# Patient Record
Sex: FEMALE | Race: BLACK OR AFRICAN AMERICAN | NOT HISPANIC OR LATINO
[De-identification: names, ages, dates, MRNs, and addresses within clinical notes are randomized per-mention and may not be internally consistent; named-entity substitution may affect disease eponyms.]

---

## 2017-03-04 PROBLEM — Z00.00 ENCOUNTER FOR PREVENTIVE HEALTH EXAMINATION: Status: ACTIVE | Noted: 2017-03-04

## 2017-03-08 ENCOUNTER — APPOINTMENT (OUTPATIENT)
Dept: ULTRASOUND IMAGING | Facility: HOSPITAL | Age: 82
End: 2017-03-08

## 2017-03-08 ENCOUNTER — OUTPATIENT (OUTPATIENT)
Dept: OUTPATIENT SERVICES | Facility: HOSPITAL | Age: 82
LOS: 1 days | Discharge: HOME | End: 2017-03-08
Payer: COMMERCIAL

## 2017-03-08 DIAGNOSIS — R60.9 EDEMA, UNSPECIFIED: ICD-10-CM

## 2017-03-08 PROCEDURE — 93923 UPR/LXTR ART STDY 3+ LVLS: CPT | Mod: 26

## 2018-07-30 ENCOUNTER — INPATIENT (INPATIENT)
Facility: HOSPITAL | Age: 83
LOS: 9 days | Discharge: NURSING, REHAB OR LONG-TERM CARE | DRG: 378 | End: 2018-08-09
Attending: INTERNAL MEDICINE | Admitting: INTERNAL MEDICINE
Payer: COMMERCIAL

## 2018-07-30 VITALS
HEIGHT: 63 IN | TEMPERATURE: 99 F | HEART RATE: 85 BPM | WEIGHT: 169.98 LBS | SYSTOLIC BLOOD PRESSURE: 109 MMHG | DIASTOLIC BLOOD PRESSURE: 65 MMHG | RESPIRATION RATE: 16 BRPM | OXYGEN SATURATION: 99 %

## 2018-07-30 DIAGNOSIS — D64.9 ANEMIA, UNSPECIFIED: ICD-10-CM

## 2018-07-30 DIAGNOSIS — N17.9 ACUTE KIDNEY FAILURE, UNSPECIFIED: ICD-10-CM

## 2018-07-30 DIAGNOSIS — I10 ESSENTIAL (PRIMARY) HYPERTENSION: ICD-10-CM

## 2018-07-30 DIAGNOSIS — R55 SYNCOPE AND COLLAPSE: ICD-10-CM

## 2018-07-30 DIAGNOSIS — E11.9 TYPE 2 DIABETES MELLITUS WITHOUT COMPLICATIONS: ICD-10-CM

## 2018-07-30 DIAGNOSIS — Z29.9 ENCOUNTER FOR PROPHYLACTIC MEASURES, UNSPECIFIED: ICD-10-CM

## 2018-07-30 DIAGNOSIS — E87.5 HYPERKALEMIA: ICD-10-CM

## 2018-07-30 LAB
ALBUMIN SERPL ELPH-MCNC: 2.5 G/DL — LOW (ref 3.5–5)
ALBUMIN SERPL ELPH-MCNC: 2.5 G/DL — LOW (ref 3.5–5)
ALP SERPL-CCNC: 55 U/L — SIGNIFICANT CHANGE UP (ref 40–120)
ALP SERPL-CCNC: 60 U/L — SIGNIFICANT CHANGE UP (ref 40–120)
ALT FLD-CCNC: 17 U/L DA — SIGNIFICANT CHANGE UP (ref 10–60)
ALT FLD-CCNC: 20 U/L DA — SIGNIFICANT CHANGE UP (ref 10–60)
ANION GAP SERPL CALC-SCNC: 4 MMOL/L — LOW (ref 5–17)
ANION GAP SERPL CALC-SCNC: 5 MMOL/L — SIGNIFICANT CHANGE UP (ref 5–17)
APTT BLD: 41 SEC — HIGH (ref 27.5–37.4)
AST SERPL-CCNC: 15 U/L — SIGNIFICANT CHANGE UP (ref 10–40)
AST SERPL-CCNC: 40 U/L — SIGNIFICANT CHANGE UP (ref 10–40)
BASOPHILS # BLD AUTO: 0.1 K/UL — SIGNIFICANT CHANGE UP (ref 0–0.2)
BASOPHILS NFR BLD AUTO: 1.1 % — SIGNIFICANT CHANGE UP (ref 0–2)
BILIRUB SERPL-MCNC: 0.2 MG/DL — SIGNIFICANT CHANGE UP (ref 0.2–1.2)
BILIRUB SERPL-MCNC: 0.2 MG/DL — SIGNIFICANT CHANGE UP (ref 0.2–1.2)
BUN SERPL-MCNC: 22 MG/DL — HIGH (ref 7–18)
BUN SERPL-MCNC: 24 MG/DL — HIGH (ref 7–18)
CALCIUM SERPL-MCNC: 9.4 MG/DL — SIGNIFICANT CHANGE UP (ref 8.4–10.5)
CALCIUM SERPL-MCNC: 9.6 MG/DL — SIGNIFICANT CHANGE UP (ref 8.4–10.5)
CHLORIDE SERPL-SCNC: 105 MMOL/L — SIGNIFICANT CHANGE UP (ref 96–108)
CHLORIDE SERPL-SCNC: 105 MMOL/L — SIGNIFICANT CHANGE UP (ref 96–108)
CK MB BLD-MCNC: 1.6 % — SIGNIFICANT CHANGE UP (ref 0–3.5)
CK MB CFR SERPL CALC: 1.4 NG/ML — SIGNIFICANT CHANGE UP (ref 0–3.6)
CK SERPL-CCNC: 90 U/L — SIGNIFICANT CHANGE UP (ref 21–215)
CO2 SERPL-SCNC: 27 MMOL/L — SIGNIFICANT CHANGE UP (ref 22–31)
CO2 SERPL-SCNC: 30 MMOL/L — SIGNIFICANT CHANGE UP (ref 22–31)
CREAT SERPL-MCNC: 1.65 MG/DL — HIGH (ref 0.5–1.3)
CREAT SERPL-MCNC: 1.66 MG/DL — HIGH (ref 0.5–1.3)
EOSINOPHIL # BLD AUTO: 0 K/UL — SIGNIFICANT CHANGE UP (ref 0–0.5)
EOSINOPHIL NFR BLD AUTO: 0.3 % — SIGNIFICANT CHANGE UP (ref 0–6)
GLUCOSE SERPL-MCNC: 105 MG/DL — HIGH (ref 70–99)
GLUCOSE SERPL-MCNC: 90 MG/DL — SIGNIFICANT CHANGE UP (ref 70–99)
HCT VFR BLD CALC: 24.6 % — LOW (ref 34.5–45)
HGB BLD-MCNC: 7.1 G/DL — LOW (ref 11.5–15.5)
INR BLD: 1.31 RATIO — HIGH (ref 0.88–1.16)
LYMPHOCYTES # BLD AUTO: 1.7 K/UL — SIGNIFICANT CHANGE UP (ref 1–3.3)
LYMPHOCYTES # BLD AUTO: 14.4 % — SIGNIFICANT CHANGE UP (ref 13–44)
MCHC RBC-ENTMCNC: 23.1 PG — LOW (ref 27–34)
MCHC RBC-ENTMCNC: 28.8 GM/DL — LOW (ref 32–36)
MCV RBC AUTO: 80.4 FL — SIGNIFICANT CHANGE UP (ref 80–100)
MONOCYTES # BLD AUTO: 0.9 K/UL — SIGNIFICANT CHANGE UP (ref 0–0.9)
MONOCYTES NFR BLD AUTO: 7.3 % — SIGNIFICANT CHANGE UP (ref 2–14)
NEUTROPHILS # BLD AUTO: 9.3 K/UL — HIGH (ref 1.8–7.4)
NEUTROPHILS NFR BLD AUTO: 77 % — SIGNIFICANT CHANGE UP (ref 43–77)
OB PNL STL: POSITIVE
PLATELET # BLD AUTO: 386 K/UL — SIGNIFICANT CHANGE UP (ref 150–400)
POTASSIUM SERPL-MCNC: 4.7 MMOL/L — SIGNIFICANT CHANGE UP (ref 3.5–5.3)
POTASSIUM SERPL-MCNC: 6.1 MMOL/L — HIGH (ref 3.5–5.3)
POTASSIUM SERPL-SCNC: 4.7 MMOL/L — SIGNIFICANT CHANGE UP (ref 3.5–5.3)
POTASSIUM SERPL-SCNC: 6.1 MMOL/L — HIGH (ref 3.5–5.3)
PROT SERPL-MCNC: 6.8 G/DL — SIGNIFICANT CHANGE UP (ref 6–8.3)
PROT SERPL-MCNC: 6.8 G/DL — SIGNIFICANT CHANGE UP (ref 6–8.3)
PROTHROM AB SERPL-ACNC: 14.4 SEC — HIGH (ref 9.8–12.7)
RBC # BLD: 3.05 M/UL — LOW (ref 3.8–5.2)
RBC # FLD: 19 % — HIGH (ref 10.3–14.5)
SODIUM SERPL-SCNC: 137 MMOL/L — SIGNIFICANT CHANGE UP (ref 135–145)
SODIUM SERPL-SCNC: 139 MMOL/L — SIGNIFICANT CHANGE UP (ref 135–145)
TROPONIN I SERPL-MCNC: <0.015 NG/ML — SIGNIFICANT CHANGE UP (ref 0–0.04)
WBC # BLD: 12.1 K/UL — HIGH (ref 3.8–10.5)
WBC # FLD AUTO: 12.1 K/UL — HIGH (ref 3.8–10.5)

## 2018-07-30 PROCEDURE — 99223 1ST HOSP IP/OBS HIGH 75: CPT | Mod: AI,GC

## 2018-07-30 PROCEDURE — 71045 X-RAY EXAM CHEST 1 VIEW: CPT | Mod: 26

## 2018-07-30 PROCEDURE — 99285 EMERGENCY DEPT VISIT HI MDM: CPT

## 2018-07-30 RX ORDER — SODIUM CHLORIDE 9 MG/ML
500 INJECTION INTRAMUSCULAR; INTRAVENOUS; SUBCUTANEOUS ONCE
Qty: 0 | Refills: 0 | Status: COMPLETED | OUTPATIENT
Start: 2018-07-30 | End: 2018-07-30

## 2018-07-30 RX ORDER — INSULIN LISPRO 100/ML
VIAL (ML) SUBCUTANEOUS EVERY 6 HOURS
Qty: 0 | Refills: 0 | Status: DISCONTINUED | OUTPATIENT
Start: 2018-07-30 | End: 2018-08-09

## 2018-07-30 RX ORDER — PANTOPRAZOLE SODIUM 20 MG/1
8 TABLET, DELAYED RELEASE ORAL
Qty: 80 | Refills: 0 | Status: DISCONTINUED | OUTPATIENT
Start: 2018-07-30 | End: 2018-08-05

## 2018-07-30 RX ORDER — SODIUM CHLORIDE 9 MG/ML
1000 INJECTION, SOLUTION INTRAVENOUS
Qty: 0 | Refills: 0 | Status: DISCONTINUED | OUTPATIENT
Start: 2018-07-30 | End: 2018-07-31

## 2018-07-30 RX ORDER — PANTOPRAZOLE SODIUM 20 MG/1
80 TABLET, DELAYED RELEASE ORAL ONCE
Qty: 0 | Refills: 0 | Status: COMPLETED | OUTPATIENT
Start: 2018-07-30 | End: 2018-07-30

## 2018-07-30 RX ORDER — ATORVASTATIN CALCIUM 80 MG/1
40 TABLET, FILM COATED ORAL AT BEDTIME
Qty: 0 | Refills: 0 | Status: DISCONTINUED | OUTPATIENT
Start: 2018-07-30 | End: 2018-08-09

## 2018-07-30 RX ADMIN — SODIUM CHLORIDE 1000 MILLILITER(S): 9 INJECTION INTRAMUSCULAR; INTRAVENOUS; SUBCUTANEOUS at 18:37

## 2018-07-30 RX ADMIN — PANTOPRAZOLE SODIUM 10 MG/HR: 20 TABLET, DELAYED RELEASE ORAL at 22:11

## 2018-07-30 RX ADMIN — ATORVASTATIN CALCIUM 40 MILLIGRAM(S): 80 TABLET, FILM COATED ORAL at 23:55

## 2018-07-30 RX ADMIN — PANTOPRAZOLE SODIUM 80 MILLIGRAM(S): 20 TABLET, DELAYED RELEASE ORAL at 18:37

## 2018-07-30 NOTE — H&P ADULT - HISTORY OF PRESENT ILLNESS
89 year old AA female w/ PMH Dementia, HTN, DM, PALAK, HLD, Urinary Incontinence, HLD presenting with s/p syncope at 1PM - per family at bedside, event was witnessed by HHA who was showering patient and in a sitting position when she noted patient to be nonresponsive x  5-7 minutes after which patient returned to awake and alert w/ appropriate responses. Patient has no recollection of the event. Otherwise patient denied fever, chills, prodromal symptoms, HA, weakness, numbness, NVDC, abdominal pain, dysuria, urinary/bowel incontinence or any other complaints. In the ED CBC notable for Hg 7.1 - dark stool, patient is on Iron supplements, and has been having dark bowel movements x few months w/ associated fatigue and decreased exertional capacity and requiring frequent mobilization in a wheelchair. 89 year old AA female w/ PMH Dementia, HTN, DM, HLD, and Urinary Incontinence presenting with s/p syncope at 1PM - per family at bedside, event was witnessed by HHA who was showering patient and in a sitting position when she noted patient to be nonresponsive x  5-7 minutes after which patient returned to awake and alert w/ appropriate responses. Patient has no recollection of the event. Otherwise patient denied fever, chills, prodromal symptoms, HA, weakness, numbness, NVDC, abdominal pain, dysuria, urinary/bowel incontinence or any other complaints. In the ED CBC notable for Hg 7.1 - dark stool, patient is on Iron supplements, and has been having dark bowel movements x few months w/ associated fatigue and decreased exertional capacity and requiring more frequent mobilization in a wheelchair. Pt has no HX of colonoscopy x 5 years or EGD. 89 year old AA female w/ PMH Dementia, HTN, DM, HLD, and Urinary Incontinence presenting with s/p syncope at 1PM - per family at bedside, event was witnessed by HHA who was showering patient and in a sitting position when she noted patient to be nonresponsive x  5-7 minutes after which patient returned to awake and alert w/ appropriate responses. Patient has no recollection of the event. Otherwise patient denied fever, chills, prodromal symptoms, HA, weakness, numbness, NVDC, abdominal pain, dysuria, urinary/bowel incontinence or any other complaints. In the ED CBC notable for Hg 7.1 - dark stool, patient is on Iron supplements, and has been having dark bowel movements x few months w/ associated fatigue and decreased exertional capacity and requiring more frequent mobilization in a wheelchair. Pt has no HX of colonoscopy x 5 years or EGD. GOC discussed w/ family - full code. 89 year old AA female w/ PMH Dementia, HTN, DM, HLD, and Urinary Incontinence presenting with s/p syncope at 1PM - per family at bedside, event was witnessed by HHA who was showering patient and in a sitting position when she noted patient to be nonresponsive x  5-7 minutes after which patient returned to awake and alert w/ appropriate responses. Patient has no recollection of the event. Otherwise patient denied fever, chills, prodromal symptoms, HA, weakness, numbness, NVDC, abdominal pain, dysuria, urinary/bowel incontinence or any other complaints. In the ED CBC notable for Hg 7.1 - dark stool, patient is on Iron supplements, and has been having dark bowel movements x few months w/ associated fatigue and decreased exertional capacity and requiring more frequent mobilization in a wheelchair. Pt has no HX of colonoscopy x 5 years or EGD. GOC discussed w/ family - full code.

## 2018-07-30 NOTE — H&P ADULT - PROBLEM SELECTOR PLAN 2
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

## 2018-07-30 NOTE — H&P ADULT - FAMILY HISTORY
Father  Still living? Unknown  Family history of diabetes mellitus, Age at diagnosis: Age Unknown  Family history of hyperlipidemia, Age at diagnosis: Age Unknown

## 2018-07-30 NOTE — H&P ADULT - NSHPSOCIALHISTORY_GEN_ALL_CORE
As per HPI As per HPI; smoked 20 pack years, quit 30 years ago - no alcohol or illcit drug use hx - lives at home w/ family

## 2018-07-30 NOTE — H&P ADULT - ATTENDING COMMENTS
Patient was examined in the ED with Dr. Salgado.     She was brought by family who witnessed her having an episode of LOC, lasting about five minutes.   PMH is positive for dementia, hypertension, DM, HLD,   She has noted dark-colored stool for several months and has felt increasingly fatigued.     Alert and cooperative 89 yo woman.  Vital Signs Last 24 Hrs  T(C): 36.2 (30 Jul 2018 15:50), Max: 37.4 (30 Jul 2018 14:49)  T(F): 97.2 (30 Jul 2018 15:50), Max: 99.4 (30 Jul 2018 14:49)  HR: 78 (30 Jul 2018 15:50) (78 - 85)  BP: 111/63 (30 Jul 2018 15:50) (109/65 - 111/63)  BP(mean): --  RR: 16 (30 Jul 2018 15:50) (16 - 16)  SpO2: 100% (30 Jul 2018 15:50) (99% - 100%)  Lungs, clear  Cor, RRR  Abdomen, soft  Neurological, non-focal                        7.1    12.1  )-----------( 386      ( 30 Jul 2018 15:48 )             24.6   07-30    139  |  105  |  24<H>  ----------------------------<  90  4.7   |  30  |  1.66<H>    Ca    9.4      30 Jul 2018 18:05    TPro  6.8  /  Alb  2.5<L>  /  TBili  0.2  /  DBili  x   /  AST  15  /  ALT  17  /  AlkPhos  55  07-30    IMP:  Syncope, possibly vasovagal or associated with autonomic dysfunction          Anemia, likely blood loss          SALTY, unknown baseline          hyperkalemia, initial value was spurious, but repeat 4.7. Patient is on ACE          DM, stable,           HTN  Plan: Tele/orthostatics          Repeat CBC, anemia w/u, transfuse after w/u to Hb 8-9/           Hold ACE          FSBS and HSS Patient was examined in the ED with Dr. Salgado.     She was brought by family who witnessed her having an episode of LOC, lasting about five minutes.   PMH is positive for dementia, hypertension, DM, HLD,   She has noted dark-colored stool for several months and has felt increasingly fatigued.     Alert and cooperative 89 yo woman.  Vital Signs Last 24 Hrs  T(C): 36.2 (30 Jul 2018 15:50), Max: 37.4 (30 Jul 2018 14:49)  T(F): 97.2 (30 Jul 2018 15:50), Max: 99.4 (30 Jul 2018 14:49)  HR: 78 (30 Jul 2018 15:50) (78 - 85)  BP: 111/63 (30 Jul 2018 15:50) (109/65 - 111/63)  BP(mean): --  RR: 16 (30 Jul 2018 15:50) (16 - 16)  SpO2: 100% (30 Jul 2018 15:50) (99% - 100%)  Lungs, clear  Cor, RRR  Abdomen, soft, hepatomegaly.  Neurological, non-focal, decreased recent memory                        7.1    12.1  )-----------( 386      ( 30 Jul 2018 15:48 )             24.6   07-30    139  |  105  |  24<H>  ----------------------------<  90  4.7   |  30  |  1.66<H>    Ca    9.4      30 Jul 2018 18:05    TPro  6.8  /  Alb  2.5<L>  /  TBili  0.2  /  DBili  x   /  AST  15  /  ALT  17  /  AlkPhos  55  07-30    IMP:  Syncope, possibly vasovagal or associated with autonomic dysfunction          Anemia, likely blood loss          SALTY, unknown baseline          hyperkalemia, initial value was spurious, but repeat 4.7. Patient is on ACE          DM, stable,           HTN  Plan: Tele/orthostatics          Repeat CBC, anemia w/u, transfuse after w/u to Hb 8-9/           Hold ACE          FSBS and HSS

## 2018-07-30 NOTE — ED PROVIDER NOTE - OBJECTIVE STATEMENT
89 y.o w/ pmh of dementia, htn, dm noninsulin dependent, hld presenting with s/p syncope at 1pm. Per HHA, was showering patient who was in a sitting position when she noted patient to be nonresponsive. Patient responded after 5-7 minutes. HHA endorses that patient did not fall. Per family who came to her when HHA called for help, patient was already awake and alert, responding appropriately. Patient denies recalling events. Per patient's family, patient did not complaint of cp, sob, n, v, abd pain, fever, chills, cough, weakness, dysuria, diarrhea. Per HHA, no seizure like activity, no tongue biting, no loss of urine or bowel control.

## 2018-07-30 NOTE — H&P ADULT - PROBLEM SELECTOR PLAN 4
Hemolyzed specimen; EKG no peak T waves and repeat BMP K 4.7  - Hold ACEi 2/2 above and concern for SALTY

## 2018-07-30 NOTE — ED PROVIDER NOTE - PMH
Hyperchylomicronemia    Hypertension, unspecified type    Type 2 diabetes mellitus without complication, without long-term current use of insulin

## 2018-07-30 NOTE — H&P ADULT - NSHPLABSRESULTS_GEN_ALL_CORE
CBC Full  -  ( 30 Jul 2018 15:48 )  WBC Count : 12.1 K/uL  Hemoglobin : 7.1 g/dL  Hematocrit : 24.6 %  Platelet Count - Automated : 386 K/uL  Mean Cell Volume : 80.4 fl  Mean Cell Hemoglobin : 23.1 pg  Mean Cell Hemoglobin Concentration : 28.8 gm/dL  Auto Neutrophil # : 9.3 K/uL  Auto Lymphocyte # : 1.7 K/uL  Auto Monocyte # : 0.9 K/uL  Auto Eosinophil # : 0.0 K/uL  Auto Basophil # : 0.1 K/uL  Auto Neutrophil % : 77.0 %  Auto Lymphocyte % : 14.4 %  Auto Monocyte % : 7.3 %  Auto Eosinophil % : 0.3 %  Auto Basophil % : 1.1 %    07-30    139  |  105  |  24<H>  ----------------------------<  90  4.7   |  30  |  1.66<H>    Ca    9.4      30 Jul 2018 18:05    TPro  6.8  /  Alb  2.5<L>  /  TBili  0.2  /  DBili  x   /  AST  15  /  ALT  17  /  AlkPhos  55  07-30    PT/INR - ( 30 Jul 2018 18:05 )   PT: 14.4 sec;   INR: 1.31 ratio         PTT - ( 30 Jul 2018 18:05 )  PTT:41.0 sec      CARDIAC MARKERS ( 30 Jul 2018 15:48 )  <0.015 ng/mL / x     / 90 U/L / x     / 1.4 ng/mL      RADIOLOGY & ADDITIONAL STUDIES (The following images were personally reviewed):    EKG NSR

## 2018-07-30 NOTE — H&P ADULT - PROBLEM SELECTOR PLAN 7
IMPROVE VTE Individual Risk Assessment    RISK                                                          Points  [] Previous VTE                                           3  [] Thrombophilia                                        2  [] Lower limb paralysis                              2   [] Current Cancer                                       2   [x] Immobilization > 24 hrs                        1  [] ICU/CCU stay > 24 hours                       1  [x] Age > 60                                                   1    IMPROVE VTE Score: 2, DVT PPx w/ SCD

## 2018-07-30 NOTE — H&P ADULT - ASSESSMENT
89 year old AA female w/ PMH Dementia, HTN, DM, HLD, and Urinary Incontinence presenting with s/p syncope at 1PM - admitting for further evaluation of syncopal event and work up of anemia w/ concern for GIB 89 year old AA female w/ PMH Dementia, HTN, DM, HLD, and Urinary Incontinence presenting with s/p syncope at 1PM - admitting for further evaluation of syncopal event and work up of anemia w/ concern for GIB

## 2018-07-30 NOTE — H&P ADULT - NSHPPHYSICALEXAM_GEN_ALL_CORE
T(C): 36.2 (30 Jul 2018 15:50), Max: 37.4 (30 Jul 2018 14:49)  T(F): 97.2 (30 Jul 2018 15:50), Max: 99.4 (30 Jul 2018 14:49)  HR: 78 (30 Jul 2018 15:50) (78 - 85)  BP: 111/63 (30 Jul 2018 15:50) (109/65 - 111/63)  RR: 16 (30 Jul 2018 15:50) (16 - 16)  SpO2: 100% (30 Jul 2018 15:50) (99% - 100%)

## 2018-07-30 NOTE — ED ADULT NURSE NOTE - OBJECTIVE STATEMENT
Patient BIBA for near syncopal episode. As per HHA patient was sitting down when she had a sudden onset of weakness. No head injury denies any chest pains or shortness of breath

## 2018-07-30 NOTE — ED PROVIDER NOTE - CARE PLAN
Principal Discharge DX:	Syncope  Secondary Diagnosis:	SALTY (acute kidney injury)  Secondary Diagnosis:	Hyperkalemia

## 2018-07-30 NOTE — ED PROVIDER NOTE - PROGRESS NOTE DETAILS
Per patient's granddaughter, noted dark stool x 1 month. patient stool negative for active bleed, + for black tarry stool. guiac sent off. signed off to MAR and dr hillman. Fluid given, pending repeat cmp given no ekg change, concern for lab error as cause of hyperkalemia, ppi started for presumed gi bleed. informed nurse to draw coag, type and screen, 2nd IV. family aware of admission. ct head ordered per admitting team, no signs of stroke on exam, no head trauma, no acute stroke code activation needed. cxr pending, admitting team aware. vital stable. cardiac monitor ordered.

## 2018-07-30 NOTE — ED PROVIDER NOTE - MEDICAL DECISION MAKING DETAILS
89 y.o presenting with syncope, no seizure likely activity, no chest pain, normal neuro exam.   concern for anemia vs arrythmia vs mi   no seizure likely activity, no signs of ich  lab concern for anemia of 7.1, hua of 1.6, K of 6.1,ekg no stemi, will need admission for further work-up,   will obtain cxr, guaiac. no ekg change from hyperkalemia, will start fluid to treat hua, reassess hyperkalemia. 89 y.o presenting with syncope, no seizure likely activity, no chest pain, normal neuro exam.   concern for anemia vs arrythmia vs mi   no seizure likely activity, no signs of ich  lab concern for anemia of 7.1, hua of 1.6, K of 6.1,ekg no stemi, will need admission for further work-up,   will obtain cxr, guaiac. no ekg change from hyperkalemia, will start fluid to treat hua, reassess hyperkalemia with repeat cmp. ppi for gi bleed.

## 2018-07-30 NOTE — H&P ADULT - PROBLEM SELECTOR PLAN 1
P/w unresponsive x 5 minutes; unlikely Sz or CVA, concern for fatigue 2/2 anemia  - Holding ASA 2/2 anemia concerns; start moderate intensity Statin  - C/w telemetry to r/o arrythmia   ***F/u orthoatics, CD, and TTE

## 2018-07-31 DIAGNOSIS — I10 ESSENTIAL (PRIMARY) HYPERTENSION: ICD-10-CM

## 2018-07-31 DIAGNOSIS — E11.9 TYPE 2 DIABETES MELLITUS WITHOUT COMPLICATIONS: ICD-10-CM

## 2018-07-31 DIAGNOSIS — R16.0 HEPATOMEGALY, NOT ELSEWHERE CLASSIFIED: ICD-10-CM

## 2018-07-31 LAB
ALBUMIN SERPL ELPH-MCNC: 2.4 G/DL — LOW (ref 3.5–5)
ALP SERPL-CCNC: 54 U/L — SIGNIFICANT CHANGE UP (ref 40–120)
ALT FLD-CCNC: 17 U/L DA — SIGNIFICANT CHANGE UP (ref 10–60)
ANION GAP SERPL CALC-SCNC: 7 MMOL/L — SIGNIFICANT CHANGE UP (ref 5–17)
ANION GAP SERPL CALC-SCNC: 7 MMOL/L — SIGNIFICANT CHANGE UP (ref 5–17)
APPEARANCE UR: SIGNIFICANT CHANGE UP
AST SERPL-CCNC: 16 U/L — SIGNIFICANT CHANGE UP (ref 10–40)
BASOPHILS # BLD AUTO: 0.1 K/UL — SIGNIFICANT CHANGE UP (ref 0–0.2)
BASOPHILS NFR BLD AUTO: 1.2 % — SIGNIFICANT CHANGE UP (ref 0–2)
BILIRUB SERPL-MCNC: 0.2 MG/DL — SIGNIFICANT CHANGE UP (ref 0.2–1.2)
BILIRUB UR-MCNC: NEGATIVE — SIGNIFICANT CHANGE UP
BUN SERPL-MCNC: 17 MG/DL — SIGNIFICANT CHANGE UP (ref 7–18)
BUN SERPL-MCNC: 21 MG/DL — HIGH (ref 7–18)
CALCIUM SERPL-MCNC: 8.8 MG/DL — SIGNIFICANT CHANGE UP (ref 8.4–10.5)
CALCIUM SERPL-MCNC: 9.3 MG/DL — SIGNIFICANT CHANGE UP (ref 8.4–10.5)
CHLORIDE SERPL-SCNC: 105 MMOL/L — SIGNIFICANT CHANGE UP (ref 96–108)
CHLORIDE SERPL-SCNC: 108 MMOL/L — SIGNIFICANT CHANGE UP (ref 96–108)
CHOLEST SERPL-MCNC: 85 MG/DL — SIGNIFICANT CHANGE UP (ref 10–199)
CO2 SERPL-SCNC: 26 MMOL/L — SIGNIFICANT CHANGE UP (ref 22–31)
CO2 SERPL-SCNC: 29 MMOL/L — SIGNIFICANT CHANGE UP (ref 22–31)
COLOR SPEC: SIGNIFICANT CHANGE UP
CREAT ?TM UR-MCNC: 48 MG/DL — SIGNIFICANT CHANGE UP
CREAT SERPL-MCNC: 1.47 MG/DL — HIGH (ref 0.5–1.3)
CREAT SERPL-MCNC: 1.65 MG/DL — HIGH (ref 0.5–1.3)
DIFF PNL FLD: NEGATIVE — SIGNIFICANT CHANGE UP
EOSINOPHIL # BLD AUTO: 0.1 K/UL — SIGNIFICANT CHANGE UP (ref 0–0.5)
EOSINOPHIL NFR BLD AUTO: 1.6 % — SIGNIFICANT CHANGE UP (ref 0–6)
FERRITIN SERPL-MCNC: 23 NG/ML — SIGNIFICANT CHANGE UP (ref 15–150)
FOLATE SERPL-MCNC: >20 NG/ML — SIGNIFICANT CHANGE UP
GLUCOSE SERPL-MCNC: 76 MG/DL — SIGNIFICANT CHANGE UP (ref 70–99)
GLUCOSE SERPL-MCNC: 93 MG/DL — SIGNIFICANT CHANGE UP (ref 70–99)
GLUCOSE UR QL: NEGATIVE — SIGNIFICANT CHANGE UP
HBA1C BLD-MCNC: 5.7 % — HIGH (ref 4–5.6)
HCT VFR BLD CALC: 22 % — LOW (ref 34.5–45)
HCT VFR BLD CALC: 31 % — LOW (ref 34.5–45)
HDLC SERPL-MCNC: 46 MG/DL — SIGNIFICANT CHANGE UP (ref 40–125)
HGB BLD-MCNC: 10 G/DL — LOW (ref 11.5–15.5)
HGB BLD-MCNC: 6.2 G/DL — CRITICAL LOW (ref 11.5–15.5)
IRON SATN MFR SERPL: 16 UG/DL — LOW (ref 40–150)
IRON SATN MFR SERPL: 9 % — LOW (ref 15–50)
KETONES UR-MCNC: NEGATIVE — SIGNIFICANT CHANGE UP
LEUKOCYTE ESTERASE UR-ACNC: ABNORMAL
LIPID PNL WITH DIRECT LDL SERPL: 27 MG/DL — SIGNIFICANT CHANGE UP
LYMPHOCYTES # BLD AUTO: 2.2 K/UL — SIGNIFICANT CHANGE UP (ref 1–3.3)
LYMPHOCYTES # BLD AUTO: 25.8 % — SIGNIFICANT CHANGE UP (ref 13–44)
MAGNESIUM SERPL-MCNC: 1.7 MG/DL — SIGNIFICANT CHANGE UP (ref 1.6–2.6)
MCHC RBC-ENTMCNC: 22.6 PG — LOW (ref 27–34)
MCHC RBC-ENTMCNC: 26.2 PG — LOW (ref 27–34)
MCHC RBC-ENTMCNC: 28.2 GM/DL — LOW (ref 32–36)
MCHC RBC-ENTMCNC: 32.4 GM/DL — SIGNIFICANT CHANGE UP (ref 32–36)
MCV RBC AUTO: 80.3 FL — SIGNIFICANT CHANGE UP (ref 80–100)
MCV RBC AUTO: 81.1 FL — SIGNIFICANT CHANGE UP (ref 80–100)
MONOCYTES # BLD AUTO: 0.8 K/UL — SIGNIFICANT CHANGE UP (ref 0–0.9)
MONOCYTES NFR BLD AUTO: 9.6 % — SIGNIFICANT CHANGE UP (ref 2–14)
NEUTROPHILS # BLD AUTO: 5.2 K/UL — SIGNIFICANT CHANGE UP (ref 1.8–7.4)
NEUTROPHILS NFR BLD AUTO: 61.8 % — SIGNIFICANT CHANGE UP (ref 43–77)
NITRITE UR-MCNC: NEGATIVE — SIGNIFICANT CHANGE UP
OSMOLALITY UR: 389 MOS/KG — SIGNIFICANT CHANGE UP (ref 50–1200)
PH UR: 6 — SIGNIFICANT CHANGE UP (ref 5–8)
PHOSPHATE SERPL-MCNC: 3.6 MG/DL — SIGNIFICANT CHANGE UP (ref 2.5–4.5)
PLATELET # BLD AUTO: 303 K/UL — SIGNIFICANT CHANGE UP (ref 150–400)
PLATELET # BLD AUTO: 350 K/UL — SIGNIFICANT CHANGE UP (ref 150–400)
POTASSIUM SERPL-MCNC: 4.1 MMOL/L — SIGNIFICANT CHANGE UP (ref 3.5–5.3)
POTASSIUM SERPL-MCNC: 4.2 MMOL/L — SIGNIFICANT CHANGE UP (ref 3.5–5.3)
POTASSIUM SERPL-SCNC: 4.1 MMOL/L — SIGNIFICANT CHANGE UP (ref 3.5–5.3)
POTASSIUM SERPL-SCNC: 4.2 MMOL/L — SIGNIFICANT CHANGE UP (ref 3.5–5.3)
PROT SERPL-MCNC: 6.3 G/DL — SIGNIFICANT CHANGE UP (ref 6–8.3)
PROT UR-MCNC: NEGATIVE — SIGNIFICANT CHANGE UP
RBC # BLD: 2.74 M/UL — LOW (ref 3.8–5.2)
RBC # BLD: 3.83 M/UL — SIGNIFICANT CHANGE UP (ref 3.8–5.2)
RBC # FLD: 17.6 % — HIGH (ref 10.3–14.5)
RBC # FLD: 18.8 % — HIGH (ref 10.3–14.5)
SODIUM SERPL-SCNC: 141 MMOL/L — SIGNIFICANT CHANGE UP (ref 135–145)
SODIUM SERPL-SCNC: 141 MMOL/L — SIGNIFICANT CHANGE UP (ref 135–145)
SODIUM UR-SCNC: 129 MMOL/L — SIGNIFICANT CHANGE UP (ref 40–220)
SP GR SPEC: 1.01 — SIGNIFICANT CHANGE UP (ref 1.01–1.02)
TIBC SERPL-MCNC: 185 UG/DL — LOW (ref 250–450)
TOTAL CHOLESTEROL/HDL RATIO MEASUREMENT: 1.8 RATIO — LOW (ref 3.3–7.1)
TRIGL SERPL-MCNC: 61 MG/DL — SIGNIFICANT CHANGE UP (ref 10–149)
TSH SERPL-MCNC: 1.31 UU/ML — SIGNIFICANT CHANGE UP (ref 0.34–4.82)
UIBC SERPL-MCNC: 169 UG/DL — SIGNIFICANT CHANGE UP (ref 110–370)
UROBILINOGEN FLD QL: NEGATIVE — SIGNIFICANT CHANGE UP
VIT B12 SERPL-MCNC: 1713 PG/ML — HIGH (ref 232–1245)
WBC # BLD: 10.3 K/UL — SIGNIFICANT CHANGE UP (ref 3.8–10.5)
WBC # BLD: 8.4 K/UL — SIGNIFICANT CHANGE UP (ref 3.8–10.5)
WBC # FLD AUTO: 10.3 K/UL — SIGNIFICANT CHANGE UP (ref 3.8–10.5)
WBC # FLD AUTO: 8.4 K/UL — SIGNIFICANT CHANGE UP (ref 3.8–10.5)

## 2018-07-31 PROCEDURE — 99233 SBSQ HOSP IP/OBS HIGH 50: CPT | Mod: GC

## 2018-07-31 PROCEDURE — 93880 EXTRACRANIAL BILAT STUDY: CPT | Mod: 26

## 2018-07-31 PROCEDURE — 99223 1ST HOSP IP/OBS HIGH 75: CPT

## 2018-07-31 RX ORDER — SODIUM CHLORIDE 9 MG/ML
1000 INJECTION INTRAMUSCULAR; INTRAVENOUS; SUBCUTANEOUS ONCE
Qty: 0 | Refills: 0 | Status: COMPLETED | OUTPATIENT
Start: 2018-07-31 | End: 2018-07-31

## 2018-07-31 RX ORDER — SODIUM CHLORIDE 9 MG/ML
1000 INJECTION, SOLUTION INTRAVENOUS
Qty: 0 | Refills: 0 | Status: DISCONTINUED | OUTPATIENT
Start: 2018-07-31 | End: 2018-08-02

## 2018-07-31 RX ADMIN — SODIUM CHLORIDE 50 MILLILITER(S): 9 INJECTION, SOLUTION INTRAVENOUS at 05:28

## 2018-07-31 RX ADMIN — SODIUM CHLORIDE 1000 MILLILITER(S): 9 INJECTION INTRAMUSCULAR; INTRAVENOUS; SUBCUTANEOUS at 14:41

## 2018-07-31 RX ADMIN — ATORVASTATIN CALCIUM 40 MILLIGRAM(S): 80 TABLET, FILM COATED ORAL at 23:16

## 2018-07-31 RX ADMIN — PANTOPRAZOLE SODIUM 10 MG/HR: 20 TABLET, DELAYED RELEASE ORAL at 14:41

## 2018-07-31 NOTE — PROGRESS NOTE ADULT - SUBJECTIVE AND OBJECTIVE BOX
PGY1 Note discussed with supervising resident and primary attending.    Patient is a 89y old  Female who presents with a chief complaint of syncope and anemia (30 Jul 2018 20:25)    INTERVAL HPI/OVERNIGHT EVENTS:    Mrs. Rosenberg is seen at the bedside. She reports feeling ok today and denies any complaints. She understands her need for transfusion and agrees.    MEDICATIONS  (STANDING):  atorvastatin 40 milliGRAM(s) Oral at bedtime  dextrose 5% + sodium chloride 0.9%. 1000 milliLiter(s) (75 mL/Hr) IV Continuous <Continuous>  insulin lispro (HumaLOG) corrective regimen sliding scale   SubCutaneous every 6 hours  pantoprazole Infusion 8 mG/Hr (10 mL/Hr) IV Continuous <Continuous>  sodium chloride 0.9% Bolus 1000 milliLiter(s) IV Bolus once    MEDICATIONS  (PRN):    Allergies    No Known Allergies    Intolerances    REVIEW OF SYSTEMS:  CONSTITUTIONAL: No fever, weight loss, or fatigue  RESPIRATORY: No cough, wheezing, chills or hemoptysis; No shortness of breath  CARDIOVASCULAR: No chest pain, palpitations, dizziness, or leg swelling  GASTROINTESTINAL: No abdominal or epigastric pain. No nausea, vomiting, or hematemesis; No diarrhea or constipation. No melena or hematochezia.  NEUROLOGICAL: No headaches, memory loss, loss of strength, numbness, or tremors  SKIN: No itching, burning, rashes, or lesions     Vital Signs Last 24 Hrs  T(C): 36.7 (31 Jul 2018 07:19), Max: 37.4 (30 Jul 2018 14:49)  T(F): 98 (31 Jul 2018 07:19), Max: 99.4 (30 Jul 2018 14:49)  HR: 90 (31 Jul 2018 07:19) (78 - 98)  BP: 121/51 (31 Jul 2018 07:19) (109/65 - 124/51)  BP(mean): --  RR: 20 (31 Jul 2018 07:19) (16 - 20)  SpO2: 100% (31 Jul 2018 07:19) (98% - 100%)    PHYSICAL EXAM:  GENERAL: NAD, well-groomed, well-developed  HEAD:  Atraumatic, Normocephalic  EYES: EOMI, PERRLA, conjunctiva and sclera clear  NECK: Supple, No JVD, Normal thyroid  CHEST/LUNG: Clear to percussion bilaterally; No rales, rhonchi, wheezing, or rubs  HEART: Regular rate and rhythm; No murmurs, rubs, or gallops  ABDOMEN: Soft, Nontender, Nondistended; Bowel sounds present; hepatomegaly on palpation  NERVOUS SYSTEM:  Alert & Oriented X3, Good concentration; Motor Strength 5/5 B/L   EXTREMITIES:  2+ Peripheral Pulses, No clubbing, cyanosis, or edema    LABS:                        6.2    8.4   )-----------( 350      ( 31 Jul 2018 08:24 )             22.0     07-31    141  |  105  |  21<H>  ----------------------------<  93  4.1   |  29  |  1.65<H>    Ca    9.3      31 Jul 2018 08:24  Phos  3.6     07-31  Mg     1.7     07-31    TPro  6.3  /  Alb  2.4<L>  /  TBili  0.2  /  DBili  x   /  AST  16  /  ALT  17  /  AlkPhos  54  07-31    PT/INR - ( 30 Jul 2018 18:05 )   PT: 14.4 sec;   INR: 1.31 ratio         PTT - ( 30 Jul 2018 18:05 )  PTT:41.0 sec    CAPILLARY BLOOD GLUCOSE    POCT Blood Glucose.: 102 mg/dL (31 Jul 2018 06:45)  POCT Blood Glucose.: 86 mg/dL (30 Jul 2018 23:59)  POCT Blood Glucose.: 114 mg/dL (30 Jul 2018 14:52)      RADIOLOGY & ADDITIONAL TESTS:    Imaging Personally Reviewed:  [X ] YES  [ ] NO    Consultant(s) Notes Reviewed:  [X ] YES  [ ] NO PGY1 Note discussed with supervising resident and primary attending.    Patient is a 89y old  Female who presents with a chief complaint of syncope and anemia (30 Jul 2018 20:25)    INTERVAL HPI/OVERNIGHT EVENTS:    Mrs. Rosenberg is seen at the bedside. She reports feeling ok today and denies any complaints. She understands her need for transfusion and agrees.    MEDICATIONS  (STANDING):  atorvastatin 40 milliGRAM(s) Oral at bedtime  dextrose 5% + sodium chloride 0.9%. 1000 milliLiter(s) (75 mL/Hr) IV Continuous <Continuous>  insulin lispro (HumaLOG) corrective regimen sliding scale   SubCutaneous every 6 hours  pantoprazole Infusion 8 mG/Hr (10 mL/Hr) IV Continuous <Continuous>  sodium chloride 0.9% Bolus 1000 milliLiter(s) IV Bolus once    MEDICATIONS  (PRN):    Allergies    No Known Allergies    Intolerances    REVIEW OF SYSTEMS:  CONSTITUTIONAL: No fever or weight loss; patient endorses some fatigue  RESPIRATORY: No cough, wheezing, chills or hemoptysis; No shortness of breath  CARDIOVASCULAR: No chest pain, palpitations, dizziness, or leg swelling  GASTROINTESTINAL: No abdominal or epigastric pain. No nausea, vomiting, or hematemesis; No diarrhea or constipation. No melena or hematochezia.  NEUROLOGICAL: No headaches, memory loss, loss of strength, numbness, or tremors  SKIN: No itching, burning, rashes, or lesions     Vital Signs Last 24 Hrs  T(C): 36.7 (31 Jul 2018 07:19), Max: 37.4 (30 Jul 2018 14:49)  T(F): 98 (31 Jul 2018 07:19), Max: 99.4 (30 Jul 2018 14:49)  HR: 90 (31 Jul 2018 07:19) (78 - 98)  BP: 121/51 (31 Jul 2018 07:19) (109/65 - 124/51)  BP(mean): --  RR: 20 (31 Jul 2018 07:19) (16 - 20)  SpO2: 100% (31 Jul 2018 07:19) (98% - 100%)    PHYSICAL EXAM:  GENERAL: NAD, patient seen sitting upright in bed comfortably  HEAD:  Atraumatic, Normocephalic  CHEST/LUNG: Clear to percussion bilaterally; No rales, rhonchi, wheezing, or rubs  HEART: Regular rate and rhythm; No murmurs, rubs, or gallops  ABDOMEN: Soft, Nontender, Nondistended; Bowel sounds present; hepatomegaly on palpation  NERVOUS SYSTEM:  Alert & Oriented X3, Motor Strength 5/5 B/L   EXTREMITIES:  2+ Peripheral Pulses, No clubbing, cyanosis, or edema    LABS:                        6.2    8.4   )-----------( 350      ( 31 Jul 2018 08:24 )             22.0     07-31    141  |  105  |  21<H>  ----------------------------<  93  4.1   |  29  |  1.65<H>    Ca    9.3      31 Jul 2018 08:24  Phos  3.6     07-31  Mg     1.7     07-31    TPro  6.3  /  Alb  2.4<L>  /  TBili  0.2  /  DBili  x   /  AST  16  /  ALT  17  /  AlkPhos  54  07-31    PT/INR - ( 30 Jul 2018 18:05 )   PT: 14.4 sec;   INR: 1.31 ratio         PTT - ( 30 Jul 2018 18:05 )  PTT:41.0 sec    CAPILLARY BLOOD GLUCOSE    POCT Blood Glucose.: 102 mg/dL (31 Jul 2018 06:45)  POCT Blood Glucose.: 86 mg/dL (30 Jul 2018 23:59)  POCT Blood Glucose.: 114 mg/dL (30 Jul 2018 14:52)      RADIOLOGY & ADDITIONAL TESTS:    Imaging Personally Reviewed:  [X ] YES  [ ] NO    Consultant(s) Notes Reviewed:  [X ] YES  [ ] NO    CHEST XRAY    IMPRESSION: Small left base infiltrate.

## 2018-07-31 NOTE — PROGRESS NOTE ADULT - ASSESSMENT
Mrs. Toya Rosenberg is a 89 year old female with PMH of dementia, HTN and diabetes presenting after a syncopal episode lasting 5-7 minutes while her HHA was bathing her. After the episode, the patient returned to her baseline. Hemoglobin on admission noted to be 7.1. This morning, it dropped to 6.2. Patient transfused 1 unit PRBCs. Follow up CBC at 4pm. GI consulted - Dr. Villagran. Mrs. Toya Rosenberg is a 89 year old female with PMH of dementia, HTN and diabetes presenting after a syncopal episode lasting 5-7 minutes while her HHA was bathing her. After the episode, the patient returned to her baseline. Hemoglobin on admission noted to be 7.1. This morning, it dropped to 6.2. Patient transfused 1 unit PRBCs. Follow up CBC at 4pm. GI consulted - Dr. Villagran. Patient noted to have hepatomegaly on physical exam, possibly 2/2 to congestion. Cardiology consult pending - Dr. Crocker. Echo pending. Liver ultrasound pending.

## 2018-07-31 NOTE — CONSULT NOTE ADULT - SUBJECTIVE AND OBJECTIVE BOX
Patient is a 89y old  Female who presents with a chief complaint of syncope and anemia (2018 20:25)    Patient is a poor historian. History is obtained from H and P:    89 year old AA female w/ PMH Dementia, HTN, DM, HLD, and Urinary Incontinence presenting with s/p syncope at 1PM - per family at bedside, event was witnessed by HHA who was showering patient and in a sitting position when she noted patient to be nonresponsive x  5-7 minutes after which patient returned to awake and alert w/ appropriate responses. Patient has no recollection of the event. Otherwise patient denied fever, chills, prodromal symptoms, HA, weakness, numbness, NVDC, abdominal pain, dysuria, urinary/bowel incontinence or any other complaints. In the ED CBC notable for Hg 7.1 - dark stool, patient is on Iron supplements, and has been having dark bowel movements x few months w/ associated fatigue and decreased exertional capacity and requiring more frequent mobilization in a wheelchair.      REVIEW OF SYSTEMS  Patient is confused not very verbal   ___________________________________________________________________________________________  Allergies    No Known Allergies    Intolerances      MEDICATIONS  (STANDING):  atorvastatin 40 milliGRAM(s) Oral at bedtime  dextrose 5% + sodium chloride 0.9%. 1000 milliLiter(s) (75 mL/Hr) IV Continuous <Continuous>  insulin lispro (HumaLOG) corrective regimen sliding scale   SubCutaneous every 6 hours  pantoprazole Infusion 8 mG/Hr (10 mL/Hr) IV Continuous <Continuous>  sodium chloride 0.9% Bolus 1000 milliLiter(s) IV Bolus once    MEDICATIONS  (PRN):      PAST MEDICAL & SURGICAL HISTORY:  Hyperchylomicronemia  Hypertension, unspecified type  Type 2 diabetes mellitus without complication, without long-term current use of insulin  No significant past surgical history    FAMILY HISTORY:  Family history of hyperlipidemia (Father)  Family history of diabetes mellitus (Father)    Social History: No history of : Tobacco use, IVDA, EToH  ______________________________________________________________________________________    PHYSICAL EXAM    Daily Height in cm: 160.02 (2018 14:49)    Daily Weight in k.8 (2018 04:44)  BMI: 30.1 ( @ 14:49)  Change in Weight:  Vital Signs Last 24 Hrs  T(C): 37 (2018 11:30), Max: 37.4 (2018 14:49)  T(F): 98.6 (2018 11:30), Max: 99.4 (2018 14:49)  HR: 68 (2018 11:30) (68 - 98)  BP: 118/48 (2018 11:30) (109/65 - 124/51)  BP(mean): --  RR: 20 (2018 11:30) (16 - 20)  SpO2: 100% (2018 11:30) (98% - 100%)    General:   NAD.  Cardiovascular:  RRR normal S1/S2, no murmur.  Respiratory:  CTA B/L, normal respiratory effort.   Abdominal:   soft, no masses or tenderness, normoactive BS, NT/ND, no HSM.  Extremities:   No clubbing or cyanosis, normal capillary refill, no edema.   Rectum Dark stool    Other:   _______________________________________________________________________________________________  Lab Results:                          6.2    8.4   )-----------( 350      ( 2018 08:24 )             22.0     -    141  |  105  |  21<H>  ----------------------------<  93  4.1   |  29  |  1.65<H>    Ca    9.3      2018 08:24  Phos  3.6       Mg     1.7         TPro  6.3  /  Alb  2.4<L>  /  TBili  0.2  /  DBili  x   /  AST  16  /  ALT  17  /  AlkPhos  54      LIVER FUNCTIONS - ( 2018 08:24 )  Alb: 2.4 g/dL / Pro: 6.3 g/dL / ALK PHOS: 54 U/L / ALT: 17 U/L DA / AST: 16 U/L / GGT: x           PT/INR - ( 2018 18:05 )   PT: 14.4 sec;   INR: 1.31 ratio         PTT - ( 2018 18:05 )  PTT:41.0 sec  Triglycerides, Serum: 61 mg/dL ( @ 08:24)    CARDIAC MARKERS ( 2018 15:48 )  <0.015 ng/mL / x     / 90 U/L / x     / 1.4 ng/mL      Stool Results:          RADIOLOGY RESULTS:    SURGICAL PATHOLOGY:

## 2018-07-31 NOTE — PROGRESS NOTE ADULT - PROBLEM SELECTOR PLAN 2
Hgb 7.1 on admission, now 6.2  transfusing 1 unit PRBCs  transfuse for Hgb <7  FOBT positive; GI following - Dr. Villagran  NPO; D5NS @ 75mL/hr  follow up repeat CBC at 4pm

## 2018-07-31 NOTE — CONSULT NOTE ADULT - ASSESSMENT
89 year old female with anemia and dark stools.      I had an extensive discussion with the patient's HCP Yousuf Cruz (Grandson) @ [phone] regarding EGD. He will give consent when patient is stabilized.     Plan:  Transfuse to a goal hemoglobin > 8 (given her age)  IV PPI   NPO   Monitor CBC Q6 hours   Adequate IV access   Will plan on EGD tomorrow pending stabilization

## 2018-08-01 DIAGNOSIS — F03.90 UNSPECIFIED DEMENTIA WITHOUT BEHAVIORAL DISTURBANCE: ICD-10-CM

## 2018-08-01 DIAGNOSIS — N32.81 OVERACTIVE BLADDER: ICD-10-CM

## 2018-08-01 LAB
ANION GAP SERPL CALC-SCNC: 6 MMOL/L — SIGNIFICANT CHANGE UP (ref 5–17)
BUN SERPL-MCNC: 14 MG/DL — SIGNIFICANT CHANGE UP (ref 7–18)
CALCIUM SERPL-MCNC: 8.9 MG/DL — SIGNIFICANT CHANGE UP (ref 8.4–10.5)
CHLORIDE SERPL-SCNC: 106 MMOL/L — SIGNIFICANT CHANGE UP (ref 96–108)
CO2 SERPL-SCNC: 27 MMOL/L — SIGNIFICANT CHANGE UP (ref 22–31)
CREAT SERPL-MCNC: 1.44 MG/DL — HIGH (ref 0.5–1.3)
CULTURE RESULTS: NO GROWTH — SIGNIFICANT CHANGE UP
GLUCOSE SERPL-MCNC: 103 MG/DL — HIGH (ref 70–99)
HCT VFR BLD CALC: 26.5 % — LOW (ref 34.5–45)
HCT VFR BLD CALC: 30.5 % — LOW (ref 34.5–45)
HGB BLD-MCNC: 8.4 G/DL — LOW (ref 11.5–15.5)
HGB BLD-MCNC: 9.4 G/DL — LOW (ref 11.5–15.5)
INR BLD: 1.12 RATIO — SIGNIFICANT CHANGE UP (ref 0.88–1.16)
MCHC RBC-ENTMCNC: 25.1 PG — LOW (ref 27–34)
MCHC RBC-ENTMCNC: 25.3 PG — LOW (ref 27–34)
MCHC RBC-ENTMCNC: 30.9 GM/DL — LOW (ref 32–36)
MCHC RBC-ENTMCNC: 31.6 GM/DL — LOW (ref 32–36)
MCV RBC AUTO: 79.9 FL — LOW (ref 80–100)
MCV RBC AUTO: 81.2 FL — SIGNIFICANT CHANGE UP (ref 80–100)
PLATELET # BLD AUTO: 284 K/UL — SIGNIFICANT CHANGE UP (ref 150–400)
PLATELET # BLD AUTO: 339 K/UL — SIGNIFICANT CHANGE UP (ref 150–400)
POTASSIUM SERPL-MCNC: 3.8 MMOL/L — SIGNIFICANT CHANGE UP (ref 3.5–5.3)
POTASSIUM SERPL-SCNC: 3.8 MMOL/L — SIGNIFICANT CHANGE UP (ref 3.5–5.3)
PROTHROM AB SERPL-ACNC: 12.2 SEC — SIGNIFICANT CHANGE UP (ref 9.8–12.7)
RBC # BLD: 3.32 M/UL — LOW (ref 3.8–5.2)
RBC # BLD: 3.76 M/UL — LOW (ref 3.8–5.2)
RBC # FLD: 17.1 % — HIGH (ref 10.3–14.5)
RBC # FLD: 17.7 % — HIGH (ref 10.3–14.5)
SODIUM SERPL-SCNC: 139 MMOL/L — SIGNIFICANT CHANGE UP (ref 135–145)
SPECIMEN SOURCE: SIGNIFICANT CHANGE UP
UUN UR-MCNC: 253 MG/DL — SIGNIFICANT CHANGE UP
WBC # BLD: 10 K/UL — SIGNIFICANT CHANGE UP (ref 3.8–10.5)
WBC # BLD: 9.8 K/UL — SIGNIFICANT CHANGE UP (ref 3.8–10.5)
WBC # FLD AUTO: 10 K/UL — SIGNIFICANT CHANGE UP (ref 3.8–10.5)
WBC # FLD AUTO: 9.8 K/UL — SIGNIFICANT CHANGE UP (ref 3.8–10.5)

## 2018-08-01 PROCEDURE — 99233 SBSQ HOSP IP/OBS HIGH 50: CPT | Mod: GC

## 2018-08-01 PROCEDURE — 43235 EGD DIAGNOSTIC BRUSH WASH: CPT

## 2018-08-01 RX ORDER — OXYBUTYNIN CHLORIDE 5 MG
10 TABLET ORAL ONCE
Qty: 0 | Refills: 0 | Status: COMPLETED | OUTPATIENT
Start: 2018-08-01 | End: 2018-08-01

## 2018-08-01 RX ORDER — DONEPEZIL HYDROCHLORIDE 10 MG/1
10 TABLET, FILM COATED ORAL DAILY
Qty: 0 | Refills: 0 | Status: DISCONTINUED | OUTPATIENT
Start: 2018-08-01 | End: 2018-08-09

## 2018-08-01 RX ORDER — IRON SUCROSE 20 MG/ML
200 INJECTION, SOLUTION INTRAVENOUS EVERY 24 HOURS
Qty: 0 | Refills: 0 | Status: COMPLETED | OUTPATIENT
Start: 2018-08-01 | End: 2018-08-02

## 2018-08-01 RX ORDER — DONEPEZIL HYDROCHLORIDE 10 MG/1
10 TABLET, FILM COATED ORAL AT BEDTIME
Qty: 0 | Refills: 0 | Status: DISCONTINUED | OUTPATIENT
Start: 2018-08-01 | End: 2018-08-01

## 2018-08-01 RX ADMIN — PANTOPRAZOLE SODIUM 10 MG/HR: 20 TABLET, DELAYED RELEASE ORAL at 12:55

## 2018-08-01 RX ADMIN — DONEPEZIL HYDROCHLORIDE 10 MILLIGRAM(S): 10 TABLET, FILM COATED ORAL at 18:50

## 2018-08-01 RX ADMIN — ATORVASTATIN CALCIUM 40 MILLIGRAM(S): 80 TABLET, FILM COATED ORAL at 21:37

## 2018-08-01 RX ADMIN — IRON SUCROSE 110 MILLIGRAM(S): 20 INJECTION, SOLUTION INTRAVENOUS at 12:54

## 2018-08-01 RX ADMIN — Medication 10 MILLIGRAM(S): at 18:50

## 2018-08-01 RX ADMIN — SODIUM CHLORIDE 75 MILLILITER(S): 9 INJECTION, SOLUTION INTRAVENOUS at 12:55

## 2018-08-01 NOTE — PROGRESS NOTE ADULT - SUBJECTIVE AND OBJECTIVE BOX
PGY1 Note discussed with supervising resident and primary attending.    Patient is a 89y old  Female who presents with a chief complaint of syncope and anemia (30 Jul 2018 20:25)    INTERVAL HPI/OVERNIGHT EVENTS:    Mrs. Rosenberg is seen at the bedside. She reports feeling well after her EGD. She understands she will need to go for a repeat EGD tomorrow. She denies any complaints at this time.    MEDICATIONS  (STANDING):  atorvastatin 40 milliGRAM(s) Oral at bedtime  dextrose 5% + sodium chloride 0.9%. 1000 milliLiter(s) (75 mL/Hr) IV Continuous <Continuous>  donepezil 10 milliGRAM(s) Oral daily  insulin lispro (HumaLOG) corrective regimen sliding scale   SubCutaneous every 6 hours  iron sucrose IVPB 200 milliGRAM(s) IV Intermittent every 24 hours  oxybutynin 10 milliGRAM(s) Oral once  pantoprazole Infusion 8 mG/Hr (10 mL/Hr) IV Continuous <Continuous>    Allergies    No Known Allergies    Intolerances    REVIEW OF SYSTEMS:  CONSTITUTIONAL: No fever or weight loss; patient endorses some fatigue  RESPIRATORY: No cough, wheezing, chills or hemoptysis; No shortness of breath  CARDIOVASCULAR: No chest pain, palpitations, dizziness, or leg swelling  GASTROINTESTINAL: No abdominal or epigastric pain. No nausea, vomiting, or hematemesis; No diarrhea or constipation. No melena or hematochezia.  NEUROLOGICAL: No headaches, memory loss, loss of strength, numbness, or tremors  SKIN: No itching, burning, rashes, or lesions     Vital Signs Last 24 Hrs  T(C): 37.3 (01 Aug 2018 15:38), Max: 37.3 (01 Aug 2018 15:38)  T(F): 99.1 (01 Aug 2018 15:38), Max: 99.1 (01 Aug 2018 15:38)  HR: 80 (01 Aug 2018 15:38) (73 - 106)  BP: 143/62 (01 Aug 2018 15:38) (129/67 - 143/73)  BP(mean): --  RR: 17 (01 Aug 2018 15:38) (16 - 18)  SpO2: 100% (01 Aug 2018 15:38) (97% - 100%)    PHYSICAL EXAM:  GENERAL: NAD, patient seen sitting upright in bed comfortably  HEAD:  Atraumatic, Normocephalic  CHEST/LUNG: Clear to percussion bilaterally; No rales, rhonchi, wheezing, or rubs  HEART: Regular rate and rhythm; No murmurs, rubs, or gallops  ABDOMEN: Soft, Nontender, Nondistended; Bowel sounds present; hepatomegaly on palpation  NERVOUS SYSTEM:  Alert & Oriented X3, Motor Strength 5/5 B/L   EXTREMITIES:  2+ Peripheral Pulses, No clubbing, cyanosis, or edema    LABS:                          9.4    10.0  )-----------( 339      ( 01 Aug 2018 09:37 )             30.5     08-01    139  |  106  |  14  ----------------------------<  103<H>  3.8   |  27  |  1.44<H>    Ca    8.9      01 Aug 2018 09:37  Phos  3.6     07-31  Mg     1.7     07-31    TPro  6.3  /  Alb  2.4<L>  /  TBili  0.2  /  DBili  x   /  AST  16  /  ALT  17  /  AlkPhos  54  07-31    CAPILLARY BLOOD GLUCOSE      POCT Blood Glucose.: 139 mg/dL (01 Aug 2018 11:45)  POCT Blood Glucose.: 106 mg/dL (01 Aug 2018 07:03)  POCT Blood Glucose.: 89 mg/dL (01 Aug 2018 00:04)  POCT Blood Glucose.: 87 mg/dL (31 Jul 2018 16:36)      RADIOLOGY & ADDITIONAL TESTS:    Imaging Personally Reviewed:  [X ] YES  [ ] NO    Consultant(s) Notes Reviewed:  [X ] YES  [ ] NO    CHEST XRAY    IMPRESSION: Small left base infiltrate.    ECHO:    CONCLUSIONS:  1. Trace mitral regurgitation.  2. Normal left ventricular internal dimensions and wall  thicknesses.  3. Endocardium not well visualized; grossly normal left  ventricular systolic function. Mild diastolic dysfunction  (stage I).  4. Normal right ventricular size and function.    EF>55%

## 2018-08-01 NOTE — PROGRESS NOTE ADULT - PROBLEM SELECTOR PLAN 1
carotid doppler negative  echo normal - EF >55%  FOBT positive  follow H/H  repeat EGD tomorrow - Dr. Villagran  no colonoscopy warranted at this time as per GI

## 2018-08-01 NOTE — PROGRESS NOTE ADULT - ASSESSMENT
Mrs. Toya Rosenberg is a 89 year old female with PMH of dementia, HTN and diabetes presenting after a syncopal episode lasting 5-7 minutes while her HHA was bathing her. After the episode, the patient returned to her baseline. Hemoglobin on admission noted to be 7.1. Hemoglobin patricia to 10 s/p 2 units PRBCs. Cardiology consult - Dr. Crocker - pending. EGD today unable to visualize stomach. Repeat EGD for tomorrow. Patient denies feeling fatigued today and reports feeling well after her transfusions.

## 2018-08-01 NOTE — PROGRESS NOTE ADULT - PROBLEM SELECTOR PLAN 2
Hgb 7.1 on admission, now 9.4  s/p 2 units PRBCs  clear liquid diet; NPO after midnight  D5NS @ 75mL/hr  follow up repeat CBC at 4pm

## 2018-08-02 DIAGNOSIS — R19.00 INTRA-ABDOMINAL AND PELVIC SWELLING, MASS AND LUMP, UNSPECIFIED SITE: ICD-10-CM

## 2018-08-02 DIAGNOSIS — K92.1 MELENA: ICD-10-CM

## 2018-08-02 LAB
ANION GAP SERPL CALC-SCNC: 8 MMOL/L — SIGNIFICANT CHANGE UP (ref 5–17)
BUN SERPL-MCNC: 10 MG/DL — SIGNIFICANT CHANGE UP (ref 7–18)
CALCIUM SERPL-MCNC: 8.1 MG/DL — LOW (ref 8.4–10.5)
CHLORIDE SERPL-SCNC: 108 MMOL/L — SIGNIFICANT CHANGE UP (ref 96–108)
CO2 SERPL-SCNC: 26 MMOL/L — SIGNIFICANT CHANGE UP (ref 22–31)
CREAT SERPL-MCNC: 1.29 MG/DL — SIGNIFICANT CHANGE UP (ref 0.5–1.3)
GLUCOSE SERPL-MCNC: 116 MG/DL — HIGH (ref 70–99)
HCT VFR BLD CALC: 30.4 % — LOW (ref 34.5–45)
HCT VFR BLD CALC: 32 % — LOW (ref 34.5–45)
HGB BLD-MCNC: 10.1 G/DL — LOW (ref 11.5–15.5)
HGB BLD-MCNC: 9.5 G/DL — LOW (ref 11.5–15.5)
MCHC RBC-ENTMCNC: 25.3 PG — LOW (ref 27–34)
MCHC RBC-ENTMCNC: 25.6 PG — LOW (ref 27–34)
MCHC RBC-ENTMCNC: 31.3 GM/DL — LOW (ref 32–36)
MCHC RBC-ENTMCNC: 31.5 GM/DL — LOW (ref 32–36)
MCV RBC AUTO: 80.6 FL — SIGNIFICANT CHANGE UP (ref 80–100)
MCV RBC AUTO: 81.2 FL — SIGNIFICANT CHANGE UP (ref 80–100)
PLATELET # BLD AUTO: 326 K/UL — SIGNIFICANT CHANGE UP (ref 150–400)
PLATELET # BLD AUTO: 332 K/UL — SIGNIFICANT CHANGE UP (ref 150–400)
POTASSIUM SERPL-MCNC: 3.8 MMOL/L — SIGNIFICANT CHANGE UP (ref 3.5–5.3)
POTASSIUM SERPL-SCNC: 3.8 MMOL/L — SIGNIFICANT CHANGE UP (ref 3.5–5.3)
RBC # BLD: 3.77 M/UL — LOW (ref 3.8–5.2)
RBC # BLD: 3.94 M/UL — SIGNIFICANT CHANGE UP (ref 3.8–5.2)
RBC # FLD: 17.4 % — HIGH (ref 10.3–14.5)
RBC # FLD: 18 % — HIGH (ref 10.3–14.5)
SODIUM SERPL-SCNC: 142 MMOL/L — SIGNIFICANT CHANGE UP (ref 135–145)
WBC # BLD: 10.9 K/UL — HIGH (ref 3.8–10.5)
WBC # BLD: 12.6 K/UL — HIGH (ref 3.8–10.5)
WBC # FLD AUTO: 10.9 K/UL — HIGH (ref 3.8–10.5)
WBC # FLD AUTO: 12.6 K/UL — HIGH (ref 3.8–10.5)

## 2018-08-02 PROCEDURE — 99232 SBSQ HOSP IP/OBS MODERATE 35: CPT

## 2018-08-02 PROCEDURE — 76705 ECHO EXAM OF ABDOMEN: CPT | Mod: 26

## 2018-08-02 PROCEDURE — 74160 CT ABDOMEN W/CONTRAST: CPT | Mod: 26

## 2018-08-02 PROCEDURE — 99233 SBSQ HOSP IP/OBS HIGH 50: CPT

## 2018-08-02 RX ORDER — METOCLOPRAMIDE HCL 10 MG
10 TABLET ORAL
Qty: 0 | Refills: 0 | Status: DISCONTINUED | OUTPATIENT
Start: 2018-08-03 | End: 2018-08-03

## 2018-08-02 RX ORDER — OXYBUTYNIN CHLORIDE 5 MG
10 TABLET ORAL DAILY
Qty: 0 | Refills: 0 | Status: DISCONTINUED | OUTPATIENT
Start: 2018-08-02 | End: 2018-08-09

## 2018-08-02 RX ORDER — SODIUM CHLORIDE 9 MG/ML
1000 INJECTION, SOLUTION INTRAVENOUS
Qty: 0 | Refills: 0 | Status: DISCONTINUED | OUTPATIENT
Start: 2018-08-02 | End: 2018-08-06

## 2018-08-02 RX ORDER — ACETAMINOPHEN 500 MG
650 TABLET ORAL ONCE
Qty: 0 | Refills: 0 | Status: COMPLETED | OUTPATIENT
Start: 2018-08-02 | End: 2018-08-02

## 2018-08-02 RX ADMIN — Medication 1: at 06:15

## 2018-08-02 RX ADMIN — PANTOPRAZOLE SODIUM 10 MG/HR: 20 TABLET, DELAYED RELEASE ORAL at 21:50

## 2018-08-02 RX ADMIN — ATORVASTATIN CALCIUM 40 MILLIGRAM(S): 80 TABLET, FILM COATED ORAL at 21:50

## 2018-08-02 RX ADMIN — SODIUM CHLORIDE 75 MILLILITER(S): 9 INJECTION, SOLUTION INTRAVENOUS at 21:51

## 2018-08-02 RX ADMIN — IRON SUCROSE 110 MILLIGRAM(S): 20 INJECTION, SOLUTION INTRAVENOUS at 12:31

## 2018-08-02 RX ADMIN — SODIUM CHLORIDE 75 MILLILITER(S): 9 INJECTION, SOLUTION INTRAVENOUS at 06:03

## 2018-08-02 RX ADMIN — Medication 650 MILLIGRAM(S): at 22:10

## 2018-08-02 RX ADMIN — PANTOPRAZOLE SODIUM 10 MG/HR: 20 TABLET, DELAYED RELEASE ORAL at 06:02

## 2018-08-02 RX ADMIN — PANTOPRAZOLE SODIUM 10 MG/HR: 20 TABLET, DELAYED RELEASE ORAL at 12:31

## 2018-08-02 RX ADMIN — DONEPEZIL HYDROCHLORIDE 10 MILLIGRAM(S): 10 TABLET, FILM COATED ORAL at 12:30

## 2018-08-02 RX ADMIN — Medication 650 MILLIGRAM(S): at 23:20

## 2018-08-02 RX ADMIN — SODIUM CHLORIDE 75 MILLILITER(S): 9 INJECTION, SOLUTION INTRAVENOUS at 16:00

## 2018-08-02 NOTE — PROGRESS NOTE ADULT - SUBJECTIVE AND OBJECTIVE BOX
Subjective:   Family at bedside. No further episodes of black stools.     Objective:    MEDICATIONS  (STANDING):  atorvastatin 40 milliGRAM(s) Oral at bedtime  dextrose 5% + sodium chloride 0.9%. 1000 milliLiter(s) (75 mL/Hr) IV Continuous <Continuous>  donepezil 10 milliGRAM(s) Oral daily  insulin lispro (HumaLOG) corrective regimen sliding scale   SubCutaneous every 6 hours  pantoprazole Infusion 8 mG/Hr (10 mL/Hr) IV Continuous <Continuous>    MEDICATIONS  (PRN):              Vital Signs Last 24 Hrs  T(C): 36.4 (02 Aug 2018 11:25), Max: 37.3 (01 Aug 2018 15:38)  T(F): 97.6 (02 Aug 2018 11:), Max: 99.1 (01 Aug 2018 15:38)  HR: 80 (02 Aug 2018 11:25) (80 - 97)  BP: 140/75 (02 Aug 2018 11:) (130/55 - 154/60)  BP(mean): --  RR: 18 (02 Aug 2018 11:25) (16 - 18)  SpO2: 96% (02 Aug 2018 11:25) (95% - 100%)      General:  Well developed, well nourished, alert and active, no pallor, NAD.  Cardiovascular:  RRR normal S1/S2, no murmur.  Respiratory:  CTA B/L, normal respiratory effort.   Abdominal:   soft, no masses or tenderness, normoactive BS, NT/ND, no HSM.        LABS:                        9.5    10.9  )-----------( 326      ( 02 Aug 2018 02:10 )             30.4     08-    139  |  106  |  14  ----------------------------<  103<H>  3.8   |  27  |  1.44<H>    Ca    8.9      01 Aug 2018 09:37      PT/INR - ( 01 Aug 2018 09:37 )   PT: 12.2 sec;   INR: 1.12 ratio           Urinalysis Basic - ( 2018 16:28 )    Color: Pale Yellow / Appearance: Hazy / S.010 / pH: x  Gluc: x / Ketone: Negative  / Bili: Negative / Urobili: Negative   Blood: x / Protein: Negative / Nitrite: Negative   Leuk Esterase: Moderate / RBC: 0-2 /HPF / WBC 6-10 /HPF   Sq Epi: x / Non Sq Epi: Few /HPF / Bacteria: Few /HPF        RADIOLOGY & ADDITIONAL TESTS:

## 2018-08-02 NOTE — CONSULT NOTE ADULT - SUBJECTIVE AND OBJECTIVE BOX
SUBJECTIVE:  89 y/o F with h/o HTN, DM, and HLD came in after an episode of syncope witnessed by HHA at home. History per family at bedside. Patient has been having black stools on and off since several months. No complaints of chest pain or SOB. She did complaint of heartburn and nausea intermittently. Denies prior history of MI. She is a former smoker. Patient feels much after after 2U  blood transfusion. No complaints at this time.    HPI:  89 year old AA female w/ PMH Dementia, HTN, DM, HLD, and Urinary Incontinence presenting with s/p syncope at 1PM - per family at bedside, event was witnessed by HHA who was showering patient and in a sitting position when she noted patient to be nonresponsive x  5-7 minutes after which patient returned to awake and alert w/ appropriate responses. 89 year old AA female w/ PMH Dementia, HTN, DM, HLD, and Urinary Incontinence presenting with s/p syncope at 1PM - per family at bedside, event was witnessed by HHA who was showering patient and in a sitting position when she noted patient to be nonresponsive x  5-7 minutes after which patient returned to awake and alert w/ appropriate responses. Patient has no recollection of the event. Otherwise patient denied fever, chills, prodromal symptoms, HA, weakness, numbness, NVDC, abdominal pain, dysuria, urinary/bowel incontinence or any other complaints. In the ED CBC notable for Hg 7.1 - dark stool, patient is on Iron supplements, and has been having dark bowel movements x few months w/ associated fatigue and decreased exertional capacity and requiring more frequent mobilization in a wheelchair. Pt has no HX of colonoscopy x 5 years or EGD. GOC discussed w/ family - full code. (2018 20:25)      PAST MEDICAL & SURGICAL HISTORY:  Hyperchylomicronemia  Hypertension, unspecified type  Type 2 diabetes mellitus without complication, without long-term current use of insulin  No significant past surgical history      No Known Allergies      atorvastatin 40 milliGRAM(s) Oral at bedtime  dextrose 5% + sodium chloride 0.9%. 1000 milliLiter(s) IV Continuous <Continuous>  donepezil 10 milliGRAM(s) Oral daily  insulin lispro (HumaLOG) corrective regimen sliding scale   SubCutaneous every 6 hours  pantoprazole Infusion 8 mG/Hr IV Continuous <Continuous>      Social Hx    FAMILY HISTORY:  Family history of hyperlipidemia (Father)  Family history of diabetes mellitus (Father)    REVIEW OF SYSTEMS:  CONSTITUTIONAL: fatigue  EYES: No eye pain or discharge  ENMT:  No difficulty hearing  NECK: No pain or stiffness  RESPIRATORY: No cough, wheezing, or hemoptysis  CARDIOVASCULAR: No chest pain, palpitations, dizziness, or leg swelling  GASTROINTESTINAL: No abdominal pain. Melena  GENITOURINARY: No dysuria, frequency, hematuria  NEUROLOGICAL: No headaches, numbness, or tremors  ENDOCRINE: No heat or cold intolerance; No hair loss  MUSCULOSKELETAL: No joint pain or swelling; No muscle, back, or extremity pain    	  PHYSICAL EXAM:    Vital Signs Last 24 Hrs  T(C): 36.4 (02 Aug 2018 11:25), Max: 37.3 (01 Aug 2018 15:38)  T(F): 97.6 (02 Aug 2018 11:25), Max: 99.1 (01 Aug 2018 15:38)  HR: 80 (02 Aug 2018 11:25) (80 - 97)  BP: 140/75 (02 Aug 2018 11:25) (130/55 - 154/60)  BP(mean): --  RR: 18 (02 Aug 2018 11:25) (16 - 18)  SpO2: 96% (02 Aug 2018 11:25) (95% - 100%)      GENERAL: NAD  HEAD:  Atraumatic, Normocephalic  EYES: EOMI, PERRLA, conjunctiva and sclera clear  ENMT: Moist mucous membranes  NECK: Supple  CHEST/LUNG: Clear to auscultation  HEART: Regular rate and rhythm  ABDOMEN: Soft, Nontender, Nondistended; Bowel sounds present  EXTREMITIES:  2+ Peripheral Pulses, No clubbing, cyanosis, or edema      LABS/DIAGNOSTIC TESTS                            9.5    10.9  )-----------( 326      ( 02 Aug 2018 02:10 )             30.4       WBC Count: 10.9 K/uL ( @ 02:10)  WBC Count: 9.8 K/uL ( @ 16:33)  WBC Count: 10.0 K/uL ( @ 09:37)  WBC Count: 10.3 K/uL ( @ 21:54)  WBC Count: 8.4 K/uL ( @ 08:24)  WBC Count: 12.1 K/uL ( @ 15:48)          139  |  106  |  14  ----------------------------<  103<H>  3.8   |  27  |  1.44<H>    Ca    8.9      01 Aug 2018 09:37        Urinalysis Basic - ( 2018 16:28 )    Color: Pale Yellow / Appearance: Hazy / S.010 / pH: x  Gluc: x / Ketone: Negative  / Bili: Negative / Urobili: Negative   Blood: x / Protein: Negative / Nitrite: Negative   Leuk Esterase: Moderate / RBC: 0-2 /HPF / WBC 6-10 /HPF   Sq Epi: x / Non Sq Epi: Few /HPF / Bacteria: Few /HPF        PT/INR - ( 01 Aug 2018 09:37 )   PT: 12.2 sec;   INR: 1.12 ratio      < from: US Duplex Carotid Arteries Complete, Bilateral (18 @ 13:32) >  EXAM:  US DPLX CAROTIDS COMPL BI                            PROCEDURE DATE:  2018          INTERPRETATION:  EXAM: US DPLX CAROTIDS COMPL BI   INDICATION: Syncope.  COMPARISON: None available.    TECHNIQUE: Multiple static images from duplex sonography of the   extracranial carotid system, including color and spectral Doppler   interrogation, were submitted for evaluation.      FINDINGS:    RIGHT:  Atheromatous plaque in the proximal right ICA: mild.  ICA PSV: 51 cm/s.  ICA EDV: 9.2 cm/s.  ICA/CCA ratio: 1.7.  Vertebral Artery: Antegrade flow.  ECA: No significant stenosis.    LEFT:  Atheromatous plaque in the proximal left ICA: mild.  ICA PSV: 60 cm/s.  ICA EDV: 21 cm/s.  ICA/CCA ratio: 0.7.  Vertebral Artery: Antegrade flow.  ECA: Nosignificant stenosis.    IMPRESSION:  No velocity evidence of hemodynamically significant stenosis in either   internal carotid artery by NASCET velocity criteria.    < end of copied text >    < from: Transthoracic Echocardiogram (18 @ 07:34) >  CONCLUSIONS:  1. Trace mitral regurgitation.  2. Normal left ventricular internal dimensions and wall  thicknesses.  3. Endocardium not well visualized; grossly normal left  ventricular systolic function. Mild diastolic dysfunction  (stage I).  4. Normal right ventricular size and function.    < end of copied text >

## 2018-08-02 NOTE — PROGRESS NOTE ADULT - SUBJECTIVE AND OBJECTIVE BOX
MEDICAL ATTENDING NOTE  Patient is a 89y old  Female who presents with a chief complaint of syncope and anemia (30 Jul 2018 20:25)      HPI:  89 year old AA female w/ PMH Dementia, HTN, DM, HLD, and Urinary Incontinence presenting with s/p syncope at 1PM - per family at bedside, event was witnessed by HHA who was showering patient and in a sitting position when she noted patient to be nonresponsive x  5-7 minutes after which patient returned to awake and alert w/ appropriate responses. Patient has no recollection of the event. Otherwise patient denied fever, chills, prodromal symptoms, HA, weakness, numbness, NVDC, abdominal pain, dysuria, urinary/bowel incontinence or any other complaints. In the ED CBC notable for Hg 7.1 - dark stool, patient is on Iron supplements, and has been having dark bowel movements x few months w/ associated fatigue and decreased exertional capacity and requiring more frequent mobilization in a wheelchair. Pt has no HX of colonoscopy x 5 years or EGD. GOC discussed w/ family - full code. (30 Jul 2018 20:25)      INTERVAL HPI/OVERNIGHT EVENTS: no new complaints    MEDICATIONS  (STANDING):  atorvastatin 40 milliGRAM(s) Oral at bedtime  dextrose 5% + sodium chloride 0.9%. 1000 milliLiter(s) (75 mL/Hr) IV Continuous <Continuous>  donepezil 10 milliGRAM(s) Oral daily  insulin lispro (HumaLOG) corrective regimen sliding scale   SubCutaneous every 6 hours  oxybutynin 10 milliGRAM(s) Oral daily  pantoprazole Infusion 8 mG/Hr (10 mL/Hr) IV Continuous <Continuous>    MEDICATIONS  (PRN):      __________________________________________________  REVIEW OF SYSTEMS:    CONSTITUTIONAL: No fever,   EYES: no acute visual disturbances  NECK: No pain or stiffness  RESPIRATORY: No cough; No shortness of breath  CARDIOVASCULAR: No chest pain, no palpitations  GASTROINTESTINAL: No pain. No nausea or vomiting; No diarrhea   NEUROLOGICAL: No headache or numbness, no tremors  MUSCULOSKELETAL: No joint pain, no muscle pain  GENITOURINARY: no dysuria, no frequency, no hesitancy  PSYCHIATRY: no depression , no anxiety  ALL OTHER  ROS negative        Vital Signs Last 24 Hrs  T(C): 36.8 (02 Aug 2018 15:35), Max: 36.9 (02 Aug 2018 07:29)  T(F): 98.3 (02 Aug 2018 15:35), Max: 98.5 (02 Aug 2018 07:29)  HR: 85 (02 Aug 2018 15:35) (80 - 97)  BP: 136/64 (02 Aug 2018 15:35) (130/55 - 154/60)  BP(mean): --  RR: 18 (02 Aug 2018 15:35) (16 - 18)  SpO2: 98% (02 Aug 2018 15:35) (95% - 98%)    ________________________________________________  PHYSICAL EXAM:  GENERAL: NAD  HEENT: Normocephalic;  conjunctivae and sclerae clear; moist mucous membranes;   NECK : supple  CHEST/LUNG: Clear to auscultation bilaterally with good air entry   HEART: S1 S2  regular; no murmurs, gallops or rubs  ABDOMEN: Soft, Nontender, Nondistended; Bowel sounds present  EXTREMITIES: no cyanosis; no edema; no calf tenderness  SKIN: warm and dry; no rash  NERVOUS SYSTEM:  Awake and alert; Oriented  to place, person and time ; no new deficits    _________________________________________________  LABS:                        10.1   12.6  )-----------( 332      ( 02 Aug 2018 14:48 )             32.0     08-02    142  |  108  |  10  ----------------------------<  116<H>  3.8   |  26  |  1.29    Ca    8.1<L>      02 Aug 2018 14:48      PT/INR - ( 01 Aug 2018 09:37 )   PT: 12.2 sec;   INR: 1.12 ratio             CAPILLARY BLOOD GLUCOSE      POCT Blood Glucose.: 134 mg/dL (02 Aug 2018 16:41)  POCT Blood Glucose.: 156 mg/dL (02 Aug 2018 11:41)  POCT Blood Glucose.: 157 mg/dL (02 Aug 2018 06:05)  POCT Blood Glucose.: 129 mg/dL (01 Aug 2018 23:54)  POCT Blood Glucose.: 122 mg/dL (01 Aug 2018 21:14)      < from: US Hepatic & Pancreatic (08.02.18 @ 12:44) >  IMPRESSION:    Mass at the border of the left lobe of the liver and pancreatic head   measuring 3.9 x 2.3 x 4.1 cm. Further evaluation with contrast-enhanced   CT scan with pancreatic protocol is recommended. Results were discussed   with Dr. Carlisle at 2:30 PM on August 2, 2018.    < end of copied text >

## 2018-08-02 NOTE — PROGRESS NOTE ADULT - PROBLEM SELECTOR PLAN 1
Hepatomegaly on PE correlates with Mass of the left lobe of the liver and/or head of the pancreas seen on ultrasound. Possibly pancreatic, in origin.     Will do CT

## 2018-08-02 NOTE — PROGRESS NOTE ADULT - ASSESSMENT
GI bleed/ Gastroparesis   Clear liquid diet now   NPO post midnight for repeat EGD tomorrow   One dose of Reglan 10 mg IV Push in the am

## 2018-08-02 NOTE — PROGRESS NOTE ADULT - PROBLEM SELECTOR PLAN 2
possibly 2/2 to congestion  LFTs normal  cardio following - Dr. Crocker  echo EF >55%  Mass seen on sono, as above.   liver sono pending

## 2018-08-02 NOTE — CONSULT NOTE ADULT - ASSESSMENT
89 y/o AA female w/ PMH Dementia, HTN, DM, HLD, and Urinary Incontinence presenting with s/p syncope at 1PM - per family at bedside, event was witnessed by HHA who was showering patient and in a sitting position when she noted patient to be nonresponsive x  5-7 minutes after which patient returned to awake and alert w/ appropriate responses.  Patient was found to have hemoglobin of 7.1 on admission. EGD done on 08/01 showed food in the stomach and incomplete visualization. EGD to be repeated again today. Hb 7-->9 s/p 2U PRBC.    Syncope:  Echo result noted  Doppler US noted  Likely 2/2 to anemia and GIB   EKG- NSR  - Hold off to ASA until GIB ruled out  - c/w statin  - Optimize BP

## 2018-08-03 ENCOUNTER — TRANSCRIPTION ENCOUNTER (OUTPATIENT)
Age: 83
End: 2018-08-03

## 2018-08-03 DIAGNOSIS — K86.9 DISEASE OF PANCREAS, UNSPECIFIED: ICD-10-CM

## 2018-08-03 LAB
ANION GAP SERPL CALC-SCNC: 7 MMOL/L — SIGNIFICANT CHANGE UP (ref 5–17)
BUN SERPL-MCNC: 12 MG/DL — SIGNIFICANT CHANGE UP (ref 7–18)
CALCIUM SERPL-MCNC: 8.2 MG/DL — LOW (ref 8.4–10.5)
CHLORIDE SERPL-SCNC: 107 MMOL/L — SIGNIFICANT CHANGE UP (ref 96–108)
CO2 SERPL-SCNC: 26 MMOL/L — SIGNIFICANT CHANGE UP (ref 22–31)
CREAT SERPL-MCNC: 1.26 MG/DL — SIGNIFICANT CHANGE UP (ref 0.5–1.3)
GLUCOSE SERPL-MCNC: 136 MG/DL — HIGH (ref 70–99)
HCT VFR BLD CALC: 33.4 % — LOW (ref 34.5–45)
HGB BLD-MCNC: 10.4 G/DL — LOW (ref 11.5–15.5)
MCHC RBC-ENTMCNC: 25.6 PG — LOW (ref 27–34)
MCHC RBC-ENTMCNC: 31.2 GM/DL — LOW (ref 32–36)
MCV RBC AUTO: 82.1 FL — SIGNIFICANT CHANGE UP (ref 80–100)
PLATELET # BLD AUTO: 339 K/UL — SIGNIFICANT CHANGE UP (ref 150–400)
POTASSIUM SERPL-MCNC: 3.6 MMOL/L — SIGNIFICANT CHANGE UP (ref 3.5–5.3)
POTASSIUM SERPL-SCNC: 3.6 MMOL/L — SIGNIFICANT CHANGE UP (ref 3.5–5.3)
RBC # BLD: 4.07 M/UL — SIGNIFICANT CHANGE UP (ref 3.8–5.2)
RBC # FLD: 18.3 % — HIGH (ref 10.3–14.5)
SODIUM SERPL-SCNC: 140 MMOL/L — SIGNIFICANT CHANGE UP (ref 135–145)
WBC # BLD: 11.8 K/UL — HIGH (ref 3.8–10.5)
WBC # FLD AUTO: 11.8 K/UL — HIGH (ref 3.8–10.5)

## 2018-08-03 PROCEDURE — 99233 SBSQ HOSP IP/OBS HIGH 50: CPT | Mod: GC

## 2018-08-03 PROCEDURE — 43235 EGD DIAGNOSTIC BRUSH WASH: CPT

## 2018-08-03 RX ORDER — METOCLOPRAMIDE HCL 10 MG
10 TABLET ORAL THREE TIMES A DAY
Qty: 0 | Refills: 0 | Status: DISCONTINUED | OUTPATIENT
Start: 2018-08-03 | End: 2018-08-09

## 2018-08-03 RX ORDER — ERYTHROMYCIN ETHYLSUCCINATE 400 MG
10 TABLET ORAL DAILY
Qty: 0 | Refills: 0 | Status: DISCONTINUED | OUTPATIENT
Start: 2018-08-03 | End: 2018-08-03

## 2018-08-03 RX ORDER — ACETAMINOPHEN 500 MG
650 TABLET ORAL ONCE
Qty: 0 | Refills: 0 | Status: COMPLETED | OUTPATIENT
Start: 2018-08-03 | End: 2018-08-03

## 2018-08-03 RX ORDER — ERYTHROMYCIN ETHYLSUCCINATE 400 MG
200 TABLET ORAL DAILY
Qty: 0 | Refills: 0 | Status: COMPLETED | OUTPATIENT
Start: 2018-08-03 | End: 2018-08-05

## 2018-08-03 RX ADMIN — Medication 650 MILLIGRAM(S): at 22:17

## 2018-08-03 RX ADMIN — DONEPEZIL HYDROCHLORIDE 10 MILLIGRAM(S): 10 TABLET, FILM COATED ORAL at 11:59

## 2018-08-03 RX ADMIN — Medication 10 MILLIGRAM(S): at 21:05

## 2018-08-03 RX ADMIN — Medication 10 MILLIGRAM(S): at 11:59

## 2018-08-03 RX ADMIN — Medication 1: at 06:33

## 2018-08-03 RX ADMIN — SODIUM CHLORIDE 75 MILLILITER(S): 9 INJECTION, SOLUTION INTRAVENOUS at 05:53

## 2018-08-03 RX ADMIN — Medication 650 MILLIGRAM(S): at 21:04

## 2018-08-03 RX ADMIN — ATORVASTATIN CALCIUM 40 MILLIGRAM(S): 80 TABLET, FILM COATED ORAL at 21:04

## 2018-08-03 RX ADMIN — Medication 10 MILLIGRAM(S): at 05:53

## 2018-08-03 RX ADMIN — Medication 200 MILLIGRAM(S): at 23:05

## 2018-08-03 NOTE — DIETITIAN INITIAL EVALUATION ADULT. - NS FNS WEIGHT USED FOR CALC
Ht=5' 3"   Adjusted MMP=498.5 lb   Admission iw=520 lb   Current ks=488.1 lb  7/31/18-->177.4 lb 8/2/18/adjusted

## 2018-08-03 NOTE — DISCHARGE NOTE ADULT - PATIENT PORTAL LINK FT
You can access the [facility] Patient Portal, offered by [facility], by registering with the following website: [email or web address]

## 2018-08-03 NOTE — DISCHARGE NOTE ADULT - CARE PROVIDER_API CALL
Amado Garzon), Gastroenterology; Internal Medicine  [address] [place] [address]  Phone: [phone]  Fax: [phone]

## 2018-08-03 NOTE — PROGRESS NOTE ADULT - ASSESSMENT
89 y/o AA female w/ PMH Dementia, HTN, DM, HLD, and Urinary Incontinence presenting with s/p syncope at 1PM - per family at bedside, event was witnessed by HHA who was showering patient and in a sitting position when she noted patient to be nonresponsive x  5-7 minutes after which patient returned to awake and alert w/ appropriate responses.  Patient was found to have hemoglobin of 7.1 on admission. EGD done on 08/01 showed food in the stomach and incomplete visualization. EGD to be repeated again today. Hb 7-->9 s/p 2U PRBC.    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

## 2018-08-03 NOTE — CHART NOTE - NSCHARTNOTEFT_GEN_A_CORE
Spoke w/ patient and grandson at bedside about the results of the endoscopy and future further plans. Discussed the very high likelihood that the mass in the pancreas is malignant - to diagnose it would request biopsy via an endoscope US. Family made aware that the risk of that procedure is greater than an EGD w/ complications such as bleeding and perforation. Furthermore explained in detail that the utility of the diagnostic procedure would ultimately depend on whether the patient and the family would pursue a surgical intervention - likely a Whipple procedure, a very high risk procedure with the mortality worsened given the patient's advanced age. Patient and family verbalized understanding of the hospital course and the risk of future w/u and I have advised to follow up with Dr Garzon at his clinic via phone # [phone]. Patient and family to continue discussions of the future plan. Spoke w/ patient and grandson at bedside about the results of the endoscopy and future further plans. Discussed the very high likelihood that the mass in the pancreas is malignant - to diagnose it would request biopsy via an endoscope US. Family made aware that the risk of that procedure is greater than an EGD w/ complications such as bleeding and perforation. Furthermore explained in detail that the utility of the diagnostic procedure would ultimately depend on whether the patient and the family would pursue a surgical intervention - likely a Whipple procedure, a very high risk procedure with the mortality worsened given the patient's advanced age. Patient and family verbalized understanding of the hospital course and the risk of future w/u and I have advised to follow up with Dr Garzon at his clinic via phone # [phone]. Patient and family to continue discussions of the future plan.    ***Addendum; Spoke w/ radiologist w/ attending Dr Crump concerning CT findings showing no mass at pancreas but large lymph node w/ central necrosis concerning for metastatic disease. Spoke w/ IR about possibility for IR - unlikely as per area.    Plan is for UGI series w/ small bowel run through on Sunday/Monday AM to check for GI patency  - C/w full liquids Spoke w/ patient and grandson at bedside about the results of the endoscopy and future further plans. Discussed the very high likelihood that the mass in the pancreas is malignant - to diagnose it would request biopsy via an endoscope US. Family made aware that the risk of that procedure is greater than an EGD w/ complications such as bleeding and perforation. Furthermore explained in detail that the utility of the diagnostic procedure would ultimately depend on whether the patient and the family would pursue a surgical intervention - likely a Whipple procedure, a very high risk procedure with the mortality worsened given the patient's advanced age. Patient and family verbalized understanding of the hospital course and the risk of future w/u and I have advised to follow up with Dr Garzon at his clinic via phone # [phone]. Patient and family to continue discussions of the future plan.    ***Addendum; Spoke w/ radiologist w/ attending Dr Crump concerning CT findings showing no mass at pancreas but large lymph node w/ central necrosis concerning for metastatic disease. Spoke w/ IR about possibility for IR - unlikely as per area.    Plan is for UGI series w/ small bowel run through on Sunday/Monday AM to check for GI patency  - C/w full liquids      Attending:    As above.   Plan: Full liquid diet.            Erythromycin to stimulate gastric motility          UGI and small bowel follow through on Monday evening.           NPO Sunday night to Monday.          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

## 2018-08-03 NOTE — PROGRESS NOTE ADULT - SUBJECTIVE AND OBJECTIVE BOX
SUBJECTIVE:  No acute complaints this morning. Possible EGD today    HPI:  89 year old AA female w/ PMH Dementia, HTN, DM, HLD, and Urinary Incontinence presenting with s/p syncope at 1PM - per family at bedside, event was witnessed by HHA who was showering patient and in a sitting position when she noted patient to be nonresponsive x  5-7 minutes after which patient returned to awake and alert w/ appropriate responses. 89 year old AA female w/ PMH Dementia, HTN, DM, HLD, and Urinary Incontinence presenting with s/p syncope at 1PM - per family at bedside, event was witnessed by HHA who was showering patient and in a sitting position when she noted patient to be nonresponsive x  5-7 minutes after which patient returned to awake and alert w/ appropriate responses. Patient has no recollection of the event. Otherwise patient denied fever, chills, prodromal symptoms, HA, weakness, numbness, NVDC, abdominal pain, dysuria, urinary/bowel incontinence or any other complaints. In the ED CBC notable for Hg 7.1 - dark stool, patient is on Iron supplements, and has been having dark bowel movements x few months w/ associated fatigue and decreased exertional capacity and requiring more frequent mobilization in a wheelchair. Pt has no HX of colonoscopy x 5 years or EGD. GOC discussed w/ family - full code. (2018 20:25)      PAST MEDICAL & SURGICAL HISTORY:  Hyperchylomicronemia  Hypertension, unspecified type  Type 2 diabetes mellitus without complication, without long-term current use of insulin  No significant past surgical history      No Known Allergies      atorvastatin 40 milliGRAM(s) Oral at bedtime  dextrose 5% + sodium chloride 0.9%. 1000 milliLiter(s) IV Continuous <Continuous>  donepezil 10 milliGRAM(s) Oral daily  insulin lispro (HumaLOG) corrective regimen sliding scale   SubCutaneous every 6 hours  pantoprazole Infusion 8 mG/Hr IV Continuous <Continuous>      Social Hx    FAMILY HISTORY:  Family history of hyperlipidemia (Father)  Family history of diabetes mellitus (Father)    REVIEW OF SYSTEMS:  CONSTITUTIONAL: fatigue  EYES: No eye pain or discharge  ENMT:  No difficulty hearing  NECK: No pain or stiffness  RESPIRATORY: No cough, wheezing, or hemoptysis  CARDIOVASCULAR: No chest pain, palpitations, dizziness, or leg swelling  GASTROINTESTINAL: No abdominal pain. Melena  GENITOURINARY: No dysuria, frequency, hematuria  NEUROLOGICAL: No headaches, numbness, or tremors  ENDOCRINE: No heat or cold intolerance; No hair loss  MUSCULOSKELETAL: No joint pain or swelling; No muscle, back, or extremity pain    	  PHYSICAL EXAM:    Vital Signs Last 24 Hrs  T(C): 36.4 (02 Aug 2018 11:25), Max: 37.3 (01 Aug 2018 15:38)  T(F): 97.6 (02 Aug 2018 11:25), Max: 99.1 (01 Aug 2018 15:38)  HR: 80 (02 Aug 2018 11:25) (80 - 97)  BP: 140/75 (02 Aug 2018 11:25) (130/55 - 154/60)  BP(mean): --  RR: 18 (02 Aug 2018 11:25) (16 - 18)  SpO2: 96% (02 Aug 2018 11:25) (95% - 100%)      GENERAL: NAD  HEAD:  Atraumatic, Normocephalic  EYES: EOMI, PERRLA, conjunctiva and sclera clear  ENMT: Moist mucous membranes  NECK: Supple  CHEST/LUNG: Clear to auscultation  HEART: Regular rate and rhythm  ABDOMEN: Soft, Nontender, Nondistended; Bowel sounds present  EXTREMITIES:  2+ Peripheral Pulses, No clubbing, cyanosis, or edema      LABS/DIAGNOSTIC TESTS                          10.4   11.8  )-----------( 339      ( 03 Aug 2018 06:30 )             33.4   08-03    140  |  107  |  12  ----------------------------<  136<H>  3.6   |  26  |  1.26    Ca    8.2<L>      03 Aug 2018 06:30      Color: Pale Yellow / Appearance: Hazy / S.010 / pH: x  Gluc: x / Ketone: Negative  / Bili: Negative / Urobili: Negative   Blood: x / Protein: Negative / Nitrite: Negative   Leuk Esterase: Moderate / RBC: 0-2 /HPF / WBC 6-10 /HPF   Sq Epi: x / Non Sq Epi: Few /HPF / Bacteria: Few /HPF          < from: US Duplex Carotid Arteries Complete, Bilateral (18 @ 13:32) >  EXAM:  US DPLX CAROTIDS COMPL BI                            PROCEDURE DATE:  2018          INTERPRETATION:  EXAM: US DPLX CAROTIDS COMPL BI   INDICATION: Syncope.  COMPARISON: None available.    TECHNIQUE: Multiple static images from duplex sonography of the   extracranial carotid system, including color and spectral Doppler   interrogation, were submitted for evaluation.      FINDINGS:    RIGHT:  Atheromatous plaque in the proximal right ICA: mild.  ICA PSV: 51 cm/s.  ICA EDV: 9.2 cm/s.  ICA/CCA ratio: 1.7.  Vertebral Artery: Antegrade flow.  ECA: No significant stenosis.    LEFT:  Atheromatous plaque in the proximal left ICA: mild.  ICA PSV: 60 cm/s.  ICA EDV: 21 cm/s.  ICA/CCA ratio: 0.7.  Vertebral Artery: Antegrade flow.  ECA: Nosignificant stenosis.    IMPRESSION:  No velocity evidence of hemodynamically significant stenosis in either   internal carotid artery by NASCET velocity criteria.    < end of copied text >    < from: Transthoracic Echocardiogram (18 @ 07:34) >  CONCLUSIONS:  1. Trace mitral regurgitation.  2. Normal left ventricular internal dimensions and wall  thicknesses.  3. Endocardium not well visualized; grossly normal left  ventricular systolic function. Mild diastolic dysfunction  (stage I).  4. Normal right ventricular size and function.    < end of copied text >

## 2018-08-03 NOTE — DIETITIAN INITIAL EVALUATION ADULT. - OTHER INFO
nutrition assessment for NPO/Clear liquid diet x 4 to 5 d; lives home with HHA help; skin intact; GI consult noted, NPO today for endoscopy; Limited intake/wt change history data available at present

## 2018-08-03 NOTE — DISCHARGE NOTE ADULT - CONDITIONS AT DISCHARGE
[place] stable condition document sent with Pt Rt wrist saline Lock removed no sign of infection noted VS as follow Temp 97.8 HR 88 /68 Resp 18  Sat 100% R/A

## 2018-08-03 NOTE — DISCHARGE NOTE ADULT - MEDICATION SUMMARY - MEDICATIONS TO TAKE
I will START or STAY ON the medications listed below when I get home from the hospital:    quinapril 40 mg oral tablet  -- 1 tab(s) by mouth once a day  -- Indication: For HTN    gabapentin 300 mg oral capsule  -- 1 cap(s) by mouth once a day  -- Indication: For Neuropathy    metFORMIN 500 mg oral tablet  -- 1 tab(s) by mouth once a day  -- Indication: For Diabetes    metoclopramide 10 mg oral tablet  -- 1 tab(s) by mouth 3 times a day  -- Indication: For bowel regimen    simvastatin 10 mg oral tablet  -- 1 tab(s) by mouth once a day (at bedtime)  -- Indication: For HLD    donepezil 10 mg oral tablet  -- 1 tab(s) by mouth once a day (at bedtime)  -- Indication: For Dementia    ammonium lactate 12% topical cream  -- Apply on skin to affected area 2 times a day  -- Indication: For Topical    ferrous sulfate 300 mg (60 mg elemental iron) oral tablet  -- 1 tab(s) by mouth once a day  -- Indication: For Anemia    pantoprazole 40 mg oral delayed release tablet  -- 1 tab(s) by mouth 2 times a day  -- Indication: For Gi prophylaxis    VESIcare 10 mg oral tablet  -- 1 tab(s) by mouth once a day  -- Indication: For urinary symptoms I will START or STAY ON the medications listed below when I get home from the hospital:    acetaminophen 325 mg oral tablet  -- 2 tab(s) by mouth every 6 hours, As needed, Mild Pain (1 - 3)  -- Indication: For Pain    quinapril 40 mg oral tablet  -- 1 tab(s) by mouth once a day  -- Indication: For HTN    gabapentin 300 mg oral capsule  -- 1 cap(s) by mouth once a day  -- Indication: For Neuropathy    metFORMIN 500 mg oral tablet  -- 1 tab(s) by mouth once a day  -- Indication: For Diabetes    metoclopramide 10 mg oral tablet  -- 1 tab(s) by mouth 3 times a day  -- Indication: For bowel regimen    simvastatin 10 mg oral tablet  -- 1 tab(s) by mouth once a day (at bedtime)  -- Indication: For HLD    donepezil 10 mg oral tablet  -- 1 tab(s) by mouth once a day (at bedtime)  -- Indication: For Dementia    ammonium lactate 12% topical cream  -- Apply on skin to affected area 2 times a day  -- Indication: For Topical    ferrous sulfate 300 mg (60 mg elemental iron) oral tablet  -- 1 tab(s) by mouth once a day  -- Indication: For Anemia    pantoprazole 40 mg oral delayed release tablet  -- 1 tab(s) by mouth 2 times a day  -- Indication: For Gi prophylaxis    VESIcare 10 mg oral tablet  -- 1 tab(s) by mouth once a day  -- Indication: For urinary symptoms I will START or STAY ON the medications listed below when I get home from the hospital:    acetaminophen 325 mg oral tablet  -- 2 tab(s) by mouth every 6 hours, As needed, Mild Pain (1 - 3)  -- Indication: For Mild pain     quinapril 40 mg oral tablet  -- 1 tab(s) by mouth once a day  -- Indication: For HTN    gabapentin 300 mg oral capsule  -- 1 cap(s) by mouth once a day  -- Indication: For Neuropathy    metFORMIN 500 mg oral tablet  -- 1 tab(s) by mouth once a day  -- Indication: For Diabetes    metoclopramide 10 mg oral tablet  -- 1 tab(s) by mouth 3 times a day  -- Indication: For bowel regimen    simvastatin 10 mg oral tablet  -- 1 tab(s) by mouth once a day (at bedtime)  -- Indication: For HLD    donepezil 10 mg oral tablet  -- 1 tab(s) by mouth once a day (at bedtime)  -- Indication: For Dementia    ammonium lactate 12% topical cream  -- Apply on skin to affected area 2 times a day  -- Indication: For Topical    ferrous sulfate 300 mg (60 mg elemental iron) oral tablet  -- 1 tab(s) by mouth once a day  -- Indication: For Anemia    pantoprazole 40 mg oral delayed release tablet  -- 1 tab(s) by mouth once a day MDD:2 tab   -- Indication: For GI protective    oxybutynin 10 mg/24 hr oral tablet, extended release  -- 1 tab(s) by mouth once a day  -- Indication: For urinary retention     VESIcare 10 mg oral tablet  -- 1 tab(s) by mouth once a day  -- Indication: For urinary symptoms I will START or STAY ON the medications listed below when I get home from the hospital:    acetaminophen 325 mg oral tablet  -- 2 tab(s) by mouth every 6 hours, As needed, Mild Pain (1 - 3)  -- Indication: For Mild pain     gabapentin 300 mg oral capsule  -- 1 cap(s) by mouth once a day  -- Indication: For Neuropathy    metFORMIN 500 mg oral tablet  -- 1 tab(s) by mouth once a day  -- Indication: For Diabetes    metoclopramide 10 mg oral tablet  -- 1 tab(s) by mouth 3 times a day  -- Indication: For bowel regimen    simvastatin 10 mg oral tablet  -- 1 tab(s) by mouth once a day (at bedtime)  -- Indication: For HLD    Aricept 10 mg oral tablet  -- 1 tab(s) by mouth once a day (at bedtime)  -- Indication: For Dementia    ammonium lactate 12% topical cream  -- Apply on skin to affected area 2 times a day  -- Indication: For Topical    ferrous sulfate 300 mg (60 mg elemental iron) oral tablet  -- 1 tab(s) by mouth once a day  -- Indication: For Anemia    pantoprazole 40 mg oral delayed release tablet  -- 1 tab(s) by mouth once a day MDD:2 tab   -- Indication: For GI protective    oxybutynin 10 mg/24 hr oral tablet, extended release  -- 1 tab(s) by mouth once a day  -- Indication: For urinary retention

## 2018-08-03 NOTE — PHYSICAL THERAPY INITIAL EVALUATION ADULT - CRITERIA FOR SKILLED THERAPEUTIC INTERVENTIONS
impairments found/rehab potential/therapy frequency/risk reduction/prevention/functional limitations in following categories

## 2018-08-03 NOTE — PROGRESS NOTE ADULT - SUBJECTIVE AND OBJECTIVE BOX
PGY1 Note discussed with supervising resident and primary attending.    Patient is a 89y old  Female who presents with a chief complaint of syncope and anemia (30 Jul 2018 20:25)    INTERVAL HPI/OVERNIGHT EVENTS:    Mrs. Rosenberg is seen at the bedside. She denies any complaints at this time. She understands that she is going for a repeat EGD today. Patient informed about her abdominal ultrasound and CT scan results. She agrees to our plan of care at this time.    MEDICATIONS  (STANDING):  atorvastatin 40 milliGRAM(s) Oral at bedtime  dextrose 5% + sodium chloride 0.9%. 1000 milliLiter(s) (75 mL/Hr) IV Continuous <Continuous>  donepezil 10 milliGRAM(s) Oral daily  insulin lispro (HumaLOG) corrective regimen sliding scale   SubCutaneous every 6 hours  iron sucrose IVPB 200 milliGRAM(s) IV Intermittent every 24 hours  oxybutynin 10 milliGRAM(s) Oral once  pantoprazole Infusion 8 mG/Hr (10 mL/Hr) IV Continuous <Continuous>    Allergies    No Known Allergies    Intolerances    REVIEW OF SYSTEMS:  CONSTITUTIONAL: No fever or weight loss; patient endorses some fatigue  RESPIRATORY: No cough, wheezing, chills or hemoptysis; No shortness of breath  CARDIOVASCULAR: No chest pain, palpitations, dizziness, or leg swelling  GASTROINTESTINAL: No abdominal or epigastric pain. No nausea, vomiting, or hematemesis; No diarrhea or constipation. No melena or hematochezia.  NEUROLOGICAL: No headaches, memory loss, loss of strength, numbness, or tremors  SKIN: No itching, burning, rashes, or lesions     Vital Signs Last 24 Hrs  T(C): 36.2 (03 Aug 2018 11:16), Max: 36.8 (02 Aug 2018 15:35)  T(F): 97.2 (03 Aug 2018 11:16), Max: 98.3 (02 Aug 2018 15:35)  HR: 89 (03 Aug 2018 11:16) (80 - 102)  BP: 94/70 (03 Aug 2018 11:16) (94/70 - 145/69)  BP(mean): --  RR: 18 (03 Aug 2018 11:16) (17 - 18)  SpO2: 100% (03 Aug 2018 11:16) (96% - 100%)    PHYSICAL EXAM:  GENERAL: NAD, patient seen sitting upright in bed comfortably  HEAD:  Atraumatic, Normocephalic  CHEST/LUNG: Clear to percussion bilaterally; No rales, rhonchi, wheezing, or rubs  HEART: Regular rate and rhythm; No murmurs, rubs, or gallops  ABDOMEN: Soft, Nontender, Nondistended; Bowel sounds present; hepatomegaly on palpation  NERVOUS SYSTEM:  Alert & Oriented X3, Motor Strength 5/5 B/L   EXTREMITIES:  2+ Peripheral Pulses, No clubbing, cyanosis, or edema    LABS:                          10.4   11.8  )-----------( 339      ( 03 Aug 2018 06:30 )             33.4     08-03    140  |  107  |  12  ----------------------------<  136<H>  3.6   |  26  |  1.26    Ca    8.2<L>      03 Aug 2018 06:30      CAPILLARY BLOOD GLUCOSE      POCT Blood Glucose.: 121 mg/dL (03 Aug 2018 11:42)  POCT Blood Glucose.: 158 mg/dL (03 Aug 2018 06:29)  POCT Blood Glucose.: 130 mg/dL (03 Aug 2018 00:06)  POCT Blood Glucose.: 128 mg/dL (02 Aug 2018 21:58)  POCT Blood Glucose.: 134 mg/dL (02 Aug 2018 16:41)      RADIOLOGY & ADDITIONAL TESTS:    Imaging Personally Reviewed:  [X ] YES  [ ] NO    Consultant(s) Notes Reviewed:  [X ] YES  [ ] NO    CHEST XRAY    IMPRESSION: Small left base infiltrate.    ECHO:    CONCLUSIONS:  1. Trace mitral regurgitation.  2. Normal left ventricular internal dimensions and wall  thicknesses.  3. Endocardium not well visualized; grossly normal left  ventricular systolic function. Mild diastolic dysfunction  (stage I).  4. Normal right ventricular size and function.    EF>55%      U/S HEPATIC AND PANCREAS    IMPRESSION:    Mass at the border of the left lobe of the liver and pancreatic head   measuring 3.9 x 2.3 x 4.1 cm. Further evaluation with contrast-enhanced   CT scan with pancreatic protocol is recommended. Results were discussed   with Dr. Carlisle at 2:30 PM on August 2, 2018.      CT ABDOMEN    IMPRESSION:  Gastric outlet obstruction with a thickened gastric antrum and pylorus.   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   Endoscopy recommended.

## 2018-08-03 NOTE — DISCHARGE NOTE ADULT - PLAN OF CARE
Management You were noted to have a pancreatic mass on abdominal ultrasound and CT scan of your abdomen. Endoscopy showed no mass, but did show a gastric bezoar due to gastric outlet obstruction. Transfusion You came in because you had a syncopal episode at home. Your hemoglobin was found to be low at 7.1 in the hospital. This was likely the cause of your syncope. You were transfused 2 units of red blood cells. Your hemoglobin has since been stable. Blood glucose control You have a history of diabetes. Continue taking your home medications and follow up with your primary doctor in 1 week. BP Control You have a history of hypertension. We held your medications in the hospital because your blood pressure was initially low. On discharge, you may resume your medications as you were taking them. Symptom relief You were taking a medication at home for overactive bladder. We recommend you continue using it as you were for symptom relief and follow up with your primary doctor in 1 week. Prevention of progression You have a history of dementia and take donepezil at home. We advise you to continue taking this medication daily. Management of mass You were noted to have a pancreatic mass on abdominal ultrasound and CT scan of your abdomen. Endoscopy showed no mass, but did show a gastric bezoar due to gastric outlet obstruction. You had an upper GI series which was negative for dysmotility. You are advised to follow up with GI as outpatient for EUS biopsy and EGD for further testing. Continue reglan and protonix as directed. You were noted to have a pancreatic mass on abdominal ultrasound and CT scan of your abdomen. Endoscopy showed no mass, but did show a gastric bezoar due to gastric outlet obstruction. You had an upper GI series which was negative for dysmotility. You are advised to follow up with GI as outpatient for EUS biopsy and EGD for further testing. Continue reglan and protonix as directed. As discussed with family patient need to follow up with outpatient gastroenterology  for possible repeat EGD and EUS. But family understand aggressive management can be futile and harmful for patient due to co-morbidity.

## 2018-08-03 NOTE — DISCHARGE NOTE ADULT - HOSPITAL COURSE
HPI:  89 year old AA female w/ PMH Dementia, HTN, DM, HLD, and Urinary Incontinence presenting with s/p syncope at 1PM - per family at bedside, event was witnessed by HHA who was showering patient and in a sitting position when she noted patient to be nonresponsive x  5-7 minutes after which patient returned to awake and alert w/ appropriate responses. Patient has no recollection of the event. Otherwise patient denied fever, chills, prodromal symptoms, HA, weakness, numbness, NVDC, abdominal pain, dysuria, urinary/bowel incontinence or any other complaints. In the ED CBC notable for Hg 7.1 - dark stool, patient is on Iron supplements, and has been having dark bowel movements x few months w/ associated fatigue and decreased exertional capacity and requiring more frequent mobilization in a wheelchair. Pt has no HX of colonoscopy x 5 years or EGD. GOC discussed w/ family - full code. (30 Jul 2018 20:25).   Admitted for syncope, was monitored on telemetry, echo within normal limits, carotid doppler was negative. Was found to have an   sbdominal mass, had bezoar of food w/in stomach, no tenderness or distention on abdominal exam. No dysmotility on upper GI series. Was seen by Dr. Garzon, recommended reglan, protonix and outpatient EGD with EUS. For pancreatic mass, U/S liver and pancreas shows possible mass which is suspected to be lymph node, CT abdomen shows gastric outlet obstruction and suspected neoplasm with lymph node involvement, family and patient aware of findings, PGY2 had discussion regarding treatments and possible interventions. EGd was done as inpatient showing gastric bezoar. For anemia, IRON DEFICIENCY ANEMIA, though suspect she may have some component of anemia of chronic disease, po protonix BID. Hgb 7.1 on admission, now 9.8 s/p 2 units PRBCs w/ noted stability. H&H has been stable. Had SALTY which resolved with fluids. For HTN, can continue home quinipril. For diabetes, was on sliding scale as inpatient. For dementia, resumed home donepezil. Heparin sq for dvt ppx since H&H stable. Stable for discharge with GI follow up. 89 year old AA female w/ PMH Dementia, HTN, DM, HLD, and Urinary Incontinence presenting with s/p syncope at 1PM - per family at bedside, event was witnessed by HHA who was showering patient and in a sitting position when she noted patient to be nonresponsive x  5-7 minutes after which patient returned to awake and alert w/ appropriate responses. Patient has no recollection of the event. Otherwise patient denied fever, chills, prodromal symptoms, HA, weakness, numbness, NVDC, abdominal pain, dysuria, urinary/bowel incontinence or any other complaints. In the ED CBC notable for Hg 7.1 - dark stool, patient is on Iron supplements, and has been having dark bowel movements x few months w/ associated fatigue and decreased exertional capacity and requiring more frequent mobilization in a wheelchair. Pt has no HX of colonoscopy x 5 years or EGD. GOC discussed w/ family - full code. (30 Jul 2018 20:25).   Admitted for syncope, was monitored on telemetry, echo within normal limits, carotid doppler was negative. Was found to have an   sbdominal mass, had bezoar of food w/in stomach, no tenderness or distention on abdominal exam. No dysmotility on upper GI series. Was seen by Dr. Garzon, recommended reglan, protonix and outpatient EGD with EUS. For pancreatic mass, U/S liver and pancreas shows possible mass which is suspected to be lymph node, CT abdomen shows gastric outlet obstruction and suspected neoplasm with lymph node involvement, family and patient aware of findings, PGY2 had discussion regarding treatments and possible interventions. EGd was done as inpatient showing gastric bezoar. For anemia, IRON DEFICIENCY ANEMIA, though suspect she may have some component of anemia of chronic disease, po protonix BID. Hgb 7.1 on admission, now 9.8 s/p 2 units PRBCs w/ noted stability. H&H has been stable. Had SALTY which resolved with fluids. For HTN, can continue home quinipril. For diabetes, was on sliding scale as inpatient. For dementia, resumed home donepezil. Heparin sq for dvt ppx since H&H stable. Stable for discharge with GI follow up.

## 2018-08-03 NOTE — PHYSICAL THERAPY INITIAL EVALUATION ADULT - PERTINENT HX OF CURRENT PROBLEM, REHAB EVAL
Pt. admitted from home due to being non-responsive for 5-7minutes. Pt. w/ h/o Dementia, HTN, DM, HLD, Urinary incontinence

## 2018-08-03 NOTE — PHYSICAL THERAPY INITIAL EVALUATION ADULT - TRANSFER SAFETY CONCERNS NOTED: BED/CHAIR, REHAB EVAL
decreased step length/decreased balance during turns/stepping too close to front of assistive device

## 2018-08-03 NOTE — PHYSICAL THERAPY INITIAL EVALUATION ADULT - GENERAL OBSERVATIONS, REHAB EVAL
Pt. received in supine; axOx2; Iv line and  pump in place. Pt. presents w/ edema to RUE (w/ ice pack in place). Pt. presents w/ pain on cervical area (6/10); stiffness and decrease AROM to cervical movements.

## 2018-08-03 NOTE — DISCHARGE NOTE ADULT - MEDICATION SUMMARY - MEDICATIONS TO STOP TAKING
I will STOP taking the medications listed below when I get home from the hospital:    hydroCHLOROthiazide 12.5 mg oral capsule  -- 1 cap(s) by mouth once a day    CeleBREX 100 mg oral capsule  -- 1 cap(s) by mouth every other day    gabapentin 300 mg oral capsule  -- 1 cap(s) by mouth once a day I will STOP taking the medications listed below when I get home from the hospital:    hydroCHLOROthiazide 12.5 mg oral capsule  -- 1 cap(s) by mouth once a day    CeleBREX 100 mg oral capsule  -- 1 cap(s) by mouth every other day I will STOP taking the medications listed below when I get home from the hospital:    omeprazole 20 mg oral delayed release tablet  -- 1 tab(s) by mouth once a day    hydroCHLOROthiazide 12.5 mg oral capsule  -- 1 cap(s) by mouth once a day    CeleBREX 100 mg oral capsule  -- 1 cap(s) by mouth every other day

## 2018-08-03 NOTE — DISCHARGE NOTE ADULT - SECONDARY DIAGNOSIS.
Anemia Type 2 diabetes mellitus without complication, without long-term current use of insulin Hypertension Overactive bladder Dementia

## 2018-08-03 NOTE — DISCHARGE NOTE ADULT - CARE PLAN
Principal Discharge DX:	Pancreatic mass  Goal:	Management  Assessment and plan of treatment:	You were noted to have a pancreatic mass on abdominal ultrasound and CT scan of your abdomen. Endoscopy showed no mass, but did show a gastric bezoar due to gastric outlet obstruction.  Secondary Diagnosis:	Anemia  Goal:	Transfusion  Assessment and plan of treatment:	You came in because you had a syncopal episode at home. Your hemoglobin was found to be low at 7.1 in the hospital. This was likely the cause of your syncope. You were transfused 2 units of red blood cells. Your hemoglobin has since been stable.  Secondary Diagnosis:	Type 2 diabetes mellitus without complication, without long-term current use of insulin  Goal:	Blood glucose control  Assessment and plan of treatment:	You have a history of diabetes. Continue taking your home medications and follow up with your primary doctor in 1 week.  Secondary Diagnosis:	Hypertension  Goal:	BP Control  Assessment and plan of treatment:	You have a history of hypertension. We held your medications in the hospital because your blood pressure was initially low. On discharge, you may resume your medications as you were taking them.  Secondary Diagnosis:	Overactive bladder  Goal:	Symptom relief  Assessment and plan of treatment:	You were taking a medication at home for overactive bladder. We recommend you continue using it as you were for symptom relief and follow up with your primary doctor in 1 week.  Secondary Diagnosis:	Dementia  Goal:	Prevention of progression  Assessment and plan of treatment:	You have a history of dementia and take donepezil at home. We advise you to continue taking this medication daily. Principal Discharge DX:	Pancreatic mass  Goal:	Management of mass  Assessment and plan of treatment:	You were noted to have a pancreatic mass on abdominal ultrasound and CT scan of your abdomen. Endoscopy showed no mass, but did show a gastric bezoar due to gastric outlet obstruction. You had an upper GI series which was negative for dysmotility. You are advised to follow up with GI as outpatient for EUS biopsy and EGD for further testing. Continue reglan and protonix as directed.  Secondary Diagnosis:	Anemia  Goal:	Transfusion  Assessment and plan of treatment:	You came in because you had a syncopal episode at home. Your hemoglobin was found to be low at 7.1 in the hospital. This was likely the cause of your syncope. You were transfused 2 units of red blood cells. Your hemoglobin has since been stable.  Secondary Diagnosis:	Type 2 diabetes mellitus without complication, without long-term current use of insulin  Goal:	Blood glucose control  Assessment and plan of treatment:	You have a history of diabetes. Continue taking your home medications and follow up with your primary doctor in 1 week.  Secondary Diagnosis:	Hypertension  Goal:	BP Control  Assessment and plan of treatment:	You have a history of hypertension. We held your medications in the hospital because your blood pressure was initially low. On discharge, you may resume your medications as you were taking them.  Secondary Diagnosis:	Overactive bladder  Goal:	Symptom relief  Assessment and plan of treatment:	You were taking a medication at home for overactive bladder. We recommend you continue using it as you were for symptom relief and follow up with your primary doctor in 1 week.  Secondary Diagnosis:	Dementia  Goal:	Prevention of progression  Assessment and plan of treatment:	You have a history of dementia and take donepezil at home. We advise you to continue taking this medication daily. Principal Discharge DX:	Pancreatic mass  Goal:	Management of mass  Assessment and plan of treatment:	You were noted to have a pancreatic mass on abdominal ultrasound and CT scan of your abdomen. Endoscopy showed no mass, but did show a gastric bezoar due to gastric outlet obstruction. You had an upper GI series which was negative for dysmotility. You are advised to follow up with GI as outpatient for EUS biopsy and EGD for further testing. Continue reglan and protonix as directed. As discussed with family patient need to follow up with outpatient gastroenterology  for possible repeat EGD and EUS. But family understand aggressive management can be futile and harmful for patient due to co-morbidity.  Secondary Diagnosis:	Anemia  Goal:	Transfusion  Assessment and plan of treatment:	You came in because you had a syncopal episode at home. Your hemoglobin was found to be low at 7.1 in the hospital. This was likely the cause of your syncope. You were transfused 2 units of red blood cells. Your hemoglobin has since been stable.  Secondary Diagnosis:	Type 2 diabetes mellitus without complication, without long-term current use of insulin  Goal:	Blood glucose control  Assessment and plan of treatment:	You have a history of diabetes. Continue taking your home medications and follow up with your primary doctor in 1 week.  Secondary Diagnosis:	Hypertension  Goal:	BP Control  Assessment and plan of treatment:	You have a history of hypertension. We held your medications in the hospital because your blood pressure was initially low. On discharge, you may resume your medications as you were taking them.  Secondary Diagnosis:	Overactive bladder  Goal:	Symptom relief  Assessment and plan of treatment:	You were taking a medication at home for overactive bladder. We recommend you continue using it as you were for symptom relief and follow up with your primary doctor in 1 week.  Secondary Diagnosis:	Dementia  Goal:	Prevention of progression  Assessment and plan of treatment:	You have a history of dementia and take donepezil at home. We advise you to continue taking this medication daily.

## 2018-08-03 NOTE — PROGRESS NOTE ADULT - ASSESSMENT
Mrs. Toya Rosenberg is a 89 year old female with PMH of dementia, HTN and diabetes presenting after a syncopal episode lasting 5-7 minutes while her HHA was bathing her. Patient denies any dizziness at this time and hemoglobin is stable. She agrees to go for her repeat EGD today. She has been made aware of her abdominal ultrasound and CT results.

## 2018-08-03 NOTE — PROGRESS NOTE ADULT - PROBLEM SELECTOR PLAN 1
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

## 2018-08-03 NOTE — CHART NOTE - NSCHARTNOTEFT_GEN_A_CORE
Esophagogastroduodenoscopy Report  Indication: GI Bleed   Referring MD:  Dr. Crump   Instrument:  # [id number]  Anesthesia: MAC  Consent:  Informed consent was obtained from the patient after providing any opportunity for questions  Procedure: The gastroscope was gently passed through the incisoral orifice into the oral cavity and under direct visualization the esophagus was intubated. The endoscope was passed down the esophagus, through the stomach and into the 3rd portion. Color, texture, mucosa and anatomy of the esophagus, stomach, and duodenum were carefully examined with the scope. The patient tolerated the procedure well. After completion of the examination, the patient was transferred to the recovery room.   Preparation: NPO   Findings:   Oropharynx	Normal  Esophagus	Normal.   EG-junction	Regular z-line at 37 cm from the incisors.   Cardia	Normal  Body	Retained food bolus   Antrum	Retained food bolus. No evidence of mass   Pylorus	Normal  Duodenal Bulb	Normal  2nd portion	Normal  3rd portion	Normal  Date and time:8/3/2018 3:14:12 PM  EBL:0  Impression: 1- Gastric Bezoar (unable to remove endoscopically) 2- Patent Pylorus 3- No evidence of mass     Plan: 1- Liquid diet 2- Aspiration precautions 3- Reglan 10 mg TID     Procedure Start Time:  15:07   Procedure End Time:    15:12                 Attending:      Amado Garzon M.D.  Date and Time: 8/3/2018 3:14:12 PM

## 2018-08-04 LAB
ANION GAP SERPL CALC-SCNC: 10 MMOL/L — SIGNIFICANT CHANGE UP (ref 5–17)
BUN SERPL-MCNC: 13 MG/DL — SIGNIFICANT CHANGE UP (ref 7–18)
CALCIUM SERPL-MCNC: 7.6 MG/DL — LOW (ref 8.4–10.5)
CHLORIDE SERPL-SCNC: 108 MMOL/L — SIGNIFICANT CHANGE UP (ref 96–108)
CO2 SERPL-SCNC: 23 MMOL/L — SIGNIFICANT CHANGE UP (ref 22–31)
CREAT SERPL-MCNC: 1.21 MG/DL — SIGNIFICANT CHANGE UP (ref 0.5–1.3)
GLUCOSE SERPL-MCNC: 83 MG/DL — SIGNIFICANT CHANGE UP (ref 70–99)
HCT VFR BLD CALC: 30.9 % — LOW (ref 34.5–45)
HGB BLD-MCNC: 9.7 G/DL — LOW (ref 11.5–15.5)
MCHC RBC-ENTMCNC: 25.6 PG — LOW (ref 27–34)
MCHC RBC-ENTMCNC: 31.2 GM/DL — LOW (ref 32–36)
MCV RBC AUTO: 81.9 FL — SIGNIFICANT CHANGE UP (ref 80–100)
PLATELET # BLD AUTO: 330 K/UL — SIGNIFICANT CHANGE UP (ref 150–400)
POTASSIUM SERPL-MCNC: 3.7 MMOL/L — SIGNIFICANT CHANGE UP (ref 3.5–5.3)
POTASSIUM SERPL-SCNC: 3.7 MMOL/L — SIGNIFICANT CHANGE UP (ref 3.5–5.3)
RBC # BLD: 3.78 M/UL — LOW (ref 3.8–5.2)
RBC # FLD: 18.6 % — HIGH (ref 10.3–14.5)
SODIUM SERPL-SCNC: 141 MMOL/L — SIGNIFICANT CHANGE UP (ref 135–145)
WBC # BLD: 9.4 K/UL — SIGNIFICANT CHANGE UP (ref 3.8–10.5)
WBC # FLD AUTO: 9.4 K/UL — SIGNIFICANT CHANGE UP (ref 3.8–10.5)

## 2018-08-04 PROCEDURE — 99233 SBSQ HOSP IP/OBS HIGH 50: CPT | Mod: GC

## 2018-08-04 RX ORDER — SODIUM CHLORIDE 9 MG/ML
1000 INJECTION, SOLUTION INTRAVENOUS
Qty: 0 | Refills: 0 | Status: DISCONTINUED | OUTPATIENT
Start: 2018-08-04 | End: 2018-08-07

## 2018-08-04 RX ORDER — ACETAMINOPHEN 500 MG
500 TABLET ORAL EVERY 6 HOURS
Qty: 0 | Refills: 0 | Status: DISCONTINUED | OUTPATIENT
Start: 2018-08-04 | End: 2018-08-05

## 2018-08-04 RX ADMIN — Medication 10 MILLIGRAM(S): at 23:08

## 2018-08-04 RX ADMIN — Medication 10 MILLIGRAM(S): at 14:10

## 2018-08-04 RX ADMIN — ATORVASTATIN CALCIUM 40 MILLIGRAM(S): 80 TABLET, FILM COATED ORAL at 23:08

## 2018-08-04 RX ADMIN — Medication 200 MILLIGRAM(S): at 14:10

## 2018-08-04 RX ADMIN — DONEPEZIL HYDROCHLORIDE 10 MILLIGRAM(S): 10 TABLET, FILM COATED ORAL at 14:09

## 2018-08-04 RX ADMIN — Medication 10 MILLIGRAM(S): at 05:14

## 2018-08-04 NOTE — PROGRESS NOTE ADULT - PROBLEM SELECTOR PLAN 2
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

## 2018-08-04 NOTE — PROGRESS NOTE ADULT - PROBLEM SELECTOR PLAN 3
labs consistent with:  IRON DEFICIENCY ANEMIA  though suspect she may have some component of anemia of chronic disease  -Hgb 7.1 on admission, now 9.7. Suspect  s/p 2 units PRBCs w/ noted stability  monitor H/H  -no pharmacologic dvt ppx, due to concern for blood loss, though may reconsider addition of low dose heparin if hgb/hct stable over weekend labs consistent with:  IRON DEFICIENCY ANEMIA  though suspect she may have some component of anemia of chronic disease  -discontinue protonix infusion--> she has already completed 3 days. Can do protonix 40mg IV q 12--> change to oral formulation after completion of UGI series  -Hgb 7.1 on admission, now 9.7.   s/p 2 units PRBCs w/ noted stability  monitor H/H  -no pharmacologic dvt ppx, due to concern for blood loss, though may reconsider addition of low dose heparin if hgb/hct stable over weekend

## 2018-08-04 NOTE — PROGRESS NOTE ADULT - SUBJECTIVE AND OBJECTIVE BOX
Patient is a 89y old  Female who presents with a chief complaint of syncope and anemia (03 Aug 2018 17:05)      INTERVAL HPI/OVERNIGHT EVENTS: no new complaints. Feels well and wants to go home. Tolerating  full liquid diet. No abdominal pain, nausea/vomiting. Was out of bed to chair today.    MEDICATIONS  (STANDING):  atorvastatin 40 milliGRAM(s) Oral at bedtime  dextrose 5% + sodium chloride 0.9%. 1000 milliLiter(s) (75 mL/Hr) IV Continuous <Continuous>  dextrose 5% + sodium chloride 0.9%. 1000 milliLiter(s) (75 mL/Hr) IV Continuous <Continuous>  donepezil 10 milliGRAM(s) Oral daily  erythromycin    ethylsuccinate Suspension 40 mG/mL 200 milliGRAM(s) Oral daily  insulin lispro (HumaLOG) corrective regimen sliding scale   SubCutaneous every 6 hours  metoclopramide 10 milliGRAM(s) Oral three times a day  oxybutynin 10 milliGRAM(s) Oral daily  pantoprazole Infusion 8 mG/Hr (10 mL/Hr) IV Continuous <Continuous>    MEDICATIONS  (PRN):      __________________________________________________  REVIEW OF SYSTEMS:    CONSTITUTIONAL: No fever,   EYES: no acute visual disturbances  NECK: No pain or stiffness  RESPIRATORY: No cough; No shortness of breath  CARDIOVASCULAR: No chest pain, no palpitations  GASTROINTESTINAL: No pain. No nausea or vomiting; No diarrhea   NEUROLOGICAL: No headache or numbness, no tremors  MUSCULOSKELETAL: No joint pain, no muscle pain  GENITOURINARY: no dysuria, no frequency, no hesitancy  PSYCHIATRY: no depression , no anxiety  ALL OTHER  ROS negative        Vital Signs Last 24 Hrs  T(C): 37.2 (05 Aug 2018 05:49), Max: 37.2 (04 Aug 2018 11:19)  T(F): 98.9 (05 Aug 2018 05:49), Max: 99 (04 Aug 2018 11:19)  HR: 77 (05 Aug 2018 05:49) (76 - 92)  BP: 133/70 (05 Aug 2018 05:49) (127/74 - 138/72)  BP(mean): --  RR: 18 (05 Aug 2018 05:49) (17 - 19)  SpO2: 96% (05 Aug 2018 05:49) (94% - 100%)    ________________________________________________  PHYSICAL EXAM:  GENERAL: NAD  HEENT: Normocephalic;  conjunctivae and sclerae clear; moist mucous membranes;   NECK : supple  CHEST/LUNG: Clear to auscultation bilaterally with good air entry   HEART: S1 S2  regular; no murmurs, gallops or rubs  ABDOMEN: Soft, Nontender, Nondistended; Bowel sounds present  EXTREMITIES: no cyanosis; no edema; no calf tenderness  SKIN: warm and dry; no rash  NERVOUS SYSTEM:  Awake and alert; Oriented  to place, person and time ; no new deficits    _________________________________________________  LABS:               POCT Blood Glucose.: 119 mg/dL (04 Aug 2018 17:47)  POCT Blood Glucose.: 103 mg/dL (04 Aug 2018 11:50)        RADIOLOGY & ADDITIONAL TESTS:    Imaging Personally Reviewed:  YES/NO    Consultant(s) Notes Reviewed:   YES/ No    Care Discussed with Consultants :     Plan of care was discussed with patient and /or primary care giver; all questions and concerns were addressed and care was aligned with patient's wishes.

## 2018-08-04 NOTE — PROGRESS NOTE ADULT - PROBLEM SELECTOR PLAN 1
had bezoar of food w/in stomach  -there was concern for motility dysfunction w/ initiation of erythromycin and reglan had bezoar of food w/in stomach. Current abdominal exam is benign  -there was concern for motility dysfunction w/ initiation of erythromycin and reglan

## 2018-08-05 DIAGNOSIS — R19.00 INTRA-ABDOMINAL AND PELVIC SWELLING, MASS AND LUMP, UNSPECIFIED SITE: ICD-10-CM

## 2018-08-05 DIAGNOSIS — K30 FUNCTIONAL DYSPEPSIA: ICD-10-CM

## 2018-08-05 LAB
ANION GAP SERPL CALC-SCNC: 8 MMOL/L — SIGNIFICANT CHANGE UP (ref 5–17)
BUN SERPL-MCNC: 11 MG/DL — SIGNIFICANT CHANGE UP (ref 7–18)
CALCIUM SERPL-MCNC: 7.1 MG/DL — LOW (ref 8.4–10.5)
CHLORIDE SERPL-SCNC: 109 MMOL/L — HIGH (ref 96–108)
CO2 SERPL-SCNC: 25 MMOL/L — SIGNIFICANT CHANGE UP (ref 22–31)
CREAT SERPL-MCNC: 1.18 MG/DL — SIGNIFICANT CHANGE UP (ref 0.5–1.3)
GLUCOSE SERPL-MCNC: 148 MG/DL — HIGH (ref 70–99)
HCT VFR BLD CALC: 29.9 % — LOW (ref 34.5–45)
HGB BLD-MCNC: 9.1 G/DL — LOW (ref 11.5–15.5)
MCHC RBC-ENTMCNC: 25.2 PG — LOW (ref 27–34)
MCHC RBC-ENTMCNC: 30.4 GM/DL — LOW (ref 32–36)
MCV RBC AUTO: 82.8 FL — SIGNIFICANT CHANGE UP (ref 80–100)
PLATELET # BLD AUTO: 301 K/UL — SIGNIFICANT CHANGE UP (ref 150–400)
POTASSIUM SERPL-MCNC: 3.8 MMOL/L — SIGNIFICANT CHANGE UP (ref 3.5–5.3)
POTASSIUM SERPL-SCNC: 3.8 MMOL/L — SIGNIFICANT CHANGE UP (ref 3.5–5.3)
RBC # BLD: 3.61 M/UL — LOW (ref 3.8–5.2)
RBC # FLD: 18.6 % — HIGH (ref 10.3–14.5)
SODIUM SERPL-SCNC: 142 MMOL/L — SIGNIFICANT CHANGE UP (ref 135–145)
WBC # BLD: 9.3 K/UL — SIGNIFICANT CHANGE UP (ref 3.8–10.5)
WBC # FLD AUTO: 9.3 K/UL — SIGNIFICANT CHANGE UP (ref 3.8–10.5)

## 2018-08-05 PROCEDURE — 99233 SBSQ HOSP IP/OBS HIGH 50: CPT | Mod: GC

## 2018-08-05 RX ORDER — ACETAMINOPHEN 500 MG
650 TABLET ORAL ONCE
Qty: 0 | Refills: 0 | Status: COMPLETED | OUTPATIENT
Start: 2018-08-05 | End: 2018-08-05

## 2018-08-05 RX ORDER — PANTOPRAZOLE SODIUM 20 MG/1
40 TABLET, DELAYED RELEASE ORAL EVERY 12 HOURS
Qty: 0 | Refills: 0 | Status: DISCONTINUED | OUTPATIENT
Start: 2018-08-05 | End: 2018-08-07

## 2018-08-05 RX ADMIN — PANTOPRAZOLE SODIUM 10 MG/HR: 20 TABLET, DELAYED RELEASE ORAL at 00:22

## 2018-08-05 RX ADMIN — Medication 650 MILLIGRAM(S): at 01:25

## 2018-08-05 RX ADMIN — DONEPEZIL HYDROCHLORIDE 10 MILLIGRAM(S): 10 TABLET, FILM COATED ORAL at 12:05

## 2018-08-05 RX ADMIN — ATORVASTATIN CALCIUM 40 MILLIGRAM(S): 80 TABLET, FILM COATED ORAL at 21:36

## 2018-08-05 RX ADMIN — Medication 10 MILLIGRAM(S): at 12:28

## 2018-08-05 RX ADMIN — Medication 10 MILLIGRAM(S): at 05:31

## 2018-08-05 RX ADMIN — Medication 650 MILLIGRAM(S): at 00:53

## 2018-08-05 RX ADMIN — Medication 200 MILLIGRAM(S): at 12:28

## 2018-08-05 RX ADMIN — PANTOPRAZOLE SODIUM 40 MILLIGRAM(S): 20 TABLET, DELAYED RELEASE ORAL at 17:06

## 2018-08-05 RX ADMIN — Medication 10 MILLIGRAM(S): at 21:36

## 2018-08-05 RX ADMIN — Medication 10 MILLIGRAM(S): at 12:06

## 2018-08-05 RX ADMIN — SODIUM CHLORIDE 75 MILLILITER(S): 9 INJECTION, SOLUTION INTRAVENOUS at 00:38

## 2018-08-05 NOTE — PROGRESS NOTE ADULT - PROBLEM SELECTOR PLAN 3
labs consistent with:  IRON DEFICIENCY ANEMIA  though suspect she may have some component of anemia of chronic disease  -discontinue protonix infusion--> she has already completed 3 days. Can do protonix 40mg IV q 12--> change to oral formulation after completion of UGI series  -Hgb 7.1 on admission, now 9.7.   s/p 2 units PRBCs w/ noted stability  monitor H/H  -no pharmacologic dvt ppx, due to concern for blood loss, though may reconsider addition of low dose heparin if hgb/hct stable over weekend

## 2018-08-05 NOTE — PROGRESS NOTE ADULT - PROBLEM SELECTOR PLAN 2
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

## 2018-08-05 NOTE — PROGRESS NOTE ADULT - PROBLEM SELECTOR PLAN 1
had bezoar of food w/in stomach. Current abdominal exam is benign  -there was concern for motility dysfunction w/ initiation of erythromycin and reglan  --> plan for UGI w/ small bowel follow through on Monday

## 2018-08-05 NOTE — PROGRESS NOTE ADULT - SUBJECTIVE AND OBJECTIVE BOX
Patient is a 89y old  Female who presents with a chief complaint of syncope and anemia (03 Aug 2018 17:05)      INTERVAL HPI/OVERNIGHT EVENTS: no new complaints    MEDICATIONS  (STANDING):  atorvastatin 40 milliGRAM(s) Oral at bedtime  dextrose 5% + sodium chloride 0.9%. 1000 milliLiter(s) (75 mL/Hr) IV Continuous <Continuous>  dextrose 5% + sodium chloride 0.9%. 1000 milliLiter(s) (75 mL/Hr) IV Continuous <Continuous>  donepezil 10 milliGRAM(s) Oral daily  insulin lispro (HumaLOG) corrective regimen sliding scale   SubCutaneous every 6 hours  metoclopramide 10 milliGRAM(s) Oral three times a day  oxybutynin 10 milliGRAM(s) Oral daily  pantoprazole  Injectable 40 milliGRAM(s) IV Push every 12 hours    MEDICATIONS  (PRN):      __________________________________________________  REVIEW OF SYSTEMS:    CONSTITUTIONAL: No fever,   EYES: no acute visual disturbances  NECK: No pain or stiffness  RESPIRATORY: No cough; No shortness of breath  CARDIOVASCULAR: No chest pain, no palpitations  GASTROINTESTINAL: No pain. No nausea or vomiting; No diarrhea   NEUROLOGICAL: No headache or numbness, no tremors  MUSCULOSKELETAL: No joint pain, no muscle pain  GENITOURINARY: no dysuria, no frequency, no hesitancy  PSYCHIATRY: no depression , no anxiety  ALL OTHER  ROS negative        Vital Signs Last 24 Hrs  T(C): 36.8 (05 Aug 2018 20:16), Max: 37.2 (05 Aug 2018 05:49)  T(F): 98.2 (05 Aug 2018 20:16), Max: 98.9 (05 Aug 2018 05:49)  HR: 73 (05 Aug 2018 20:16) (73 - 91)  BP: 150/61 (05 Aug 2018 20:16) (133/70 - 150/61)  BP(mean): --  RR: 18 (05 Aug 2018 20:16) (17 - 18)  SpO2: 100% (05 Aug 2018 20:16) (96% - 100%)    ________________________________________________  PHYSICAL EXAM:  GENERAL: NAD  HEENT: Normocephalic;  conjunctivae and sclerae clear; moist mucous membranes;   NECK : supple  CHEST/LUNG: Clear to auscultation bilaterally with good air entry   HEART: S1 S2  regular; no murmurs, gallops or rubs  ABDOMEN: Soft, Nontender, Nondistended; Bowel sounds present  EXTREMITIES: no cyanosis; no edema; no calf tenderness  SKIN: warm and dry; no rash  NERVOUS SYSTEM:  Awake and alert; Oriented  to place, person and time ; no new deficits    _________________________________________________  LABS:                        9.1    9.3   )-----------( 301      ( 05 Aug 2018 08:03 )             29.9     08-05    142  |  109<H>  |  11  ----------------------------<  148<H>  3.8   |  25  |  1.18    Ca    7.1<L>      05 Aug 2018 08:03          CAPILLARY BLOOD GLUCOSE    POCT Blood Glucose.: 130 mg/dL (05 Aug 2018 16:20)  POCT Blood Glucose.: 175 mg/dL (05 Aug 2018 11:46)  POCT Blood Glucose.: 135 mg/dL (05 Aug 2018 08:02)  POCT Blood Glucose.: 149 mg/dL (05 Aug 2018 05:43)  POCT Blood Glucose.: 112 mg/dL (05 Aug 2018 00:33)        RADIOLOGY & ADDITIONAL TESTS:    Imaging Personally Reviewed:  YES/NO    Consultant(s) Notes Reviewed:   YES/ No    Care Discussed with Consultants :     Plan of care was discussed with patient and /or primary care giver; all questions and concerns were addressed and care was aligned with patient's wishes. Patient is a 89y old  Female who presents with a chief complaint of syncope and anemia (03 Aug 2018 17:05)      INTERVAL HPI/OVERNIGHT EVENTS: no new complaints. NO abdominal pain, nausea/vomiting. Anxious to leave the hospital    MEDICATIONS  (STANDING):  atorvastatin 40 milliGRAM(s) Oral at bedtime  dextrose 5% + sodium chloride 0.9%. 1000 milliLiter(s) (75 mL/Hr) IV Continuous <Continuous>  dextrose 5% + sodium chloride 0.9%. 1000 milliLiter(s) (75 mL/Hr) IV Continuous <Continuous>  donepezil 10 milliGRAM(s) Oral daily  insulin lispro (HumaLOG) corrective regimen sliding scale   SubCutaneous every 6 hours  metoclopramide 10 milliGRAM(s) Oral three times a day  oxybutynin 10 milliGRAM(s) Oral daily  pantoprazole  Injectable 40 milliGRAM(s) IV Push every 12 hours    MEDICATIONS  (PRN):      __________________________________________________  REVIEW OF SYSTEMS:    CONSTITUTIONAL: No fever,   EYES: no acute visual disturbances  NECK: No pain or stiffness  RESPIRATORY: No cough; No shortness of breath  CARDIOVASCULAR: No chest pain, no palpitations  GASTROINTESTINAL: No pain. No nausea or vomiting; No diarrhea   NEUROLOGICAL: No headache or numbness, no tremors  MUSCULOSKELETAL: No joint pain, no muscle pain  GENITOURINARY: no dysuria, no frequency, no hesitancy  PSYCHIATRY: no depression , no anxiety  ALL OTHER  ROS negative        Vital Signs Last 24 Hrs  T(C): 36.8 (05 Aug 2018 20:16), Max: 37.2 (05 Aug 2018 05:49)  T(F): 98.2 (05 Aug 2018 20:16), Max: 98.9 (05 Aug 2018 05:49)  HR: 73 (05 Aug 2018 20:16) (73 - 91)  BP: 150/61 (05 Aug 2018 20:16) (133/70 - 150/61)  BP(mean): --  RR: 18 (05 Aug 2018 20:16) (17 - 18)  SpO2: 100% (05 Aug 2018 20:16) (96% - 100%)    ________________________________________________  PHYSICAL EXAM:  GENERAL: NAD, non-toxic  HEENT: Normocephalic;  conjunctivae and sclerae clear; moist mucous membranes;   NECK : supple  CHEST/LUNG: Clear to auscultation bilaterally with good air entry   HEART: S1 S2  regular; no murmurs, gallops or rubs  ABDOMEN: Soft, Nontender, Nondistended; Bowel sounds present  EXTREMITIES: no cyanosis; no edema; no calf tenderness  SKIN: warm and dry; no rash  NERVOUS SYSTEM:  Awake and alert; Oriented  to place, person and time ; no new deficits    _________________________________________________  LABS:                        9.1    9.3   )-----------( 301      ( 05 Aug 2018 08:03 )             29.9     08-05    142  |  109<H>  |  11  ----------------------------<  148<H>  3.8   |  25  |  1.18    Ca    7.1<L>      05 Aug 2018 08:03          CAPILLARY BLOOD GLUCOSE    POCT Blood Glucose.: 130 mg/dL (05 Aug 2018 16:20)  POCT Blood Glucose.: 175 mg/dL (05 Aug 2018 11:46)  POCT Blood Glucose.: 135 mg/dL (05 Aug 2018 08:02)  POCT Blood Glucose.: 149 mg/dL (05 Aug 2018 05:43)  POCT Blood Glucose.: 112 mg/dL (05 Aug 2018 00:33)        RADIOLOGY & ADDITIONAL TESTS:    Imaging Personally Reviewed:  YES/NO    Consultant(s) Notes Reviewed:   YES/ No    Care Discussed with Consultants :     Plan of care was discussed with patient and /or primary care giver; all questions and concerns were addressed and care was aligned with patient's wishes.

## 2018-08-06 DIAGNOSIS — Z29.9 ENCOUNTER FOR PROPHYLACTIC MEASURES, UNSPECIFIED: ICD-10-CM

## 2018-08-06 LAB
ANION GAP SERPL CALC-SCNC: 7 MMOL/L — SIGNIFICANT CHANGE UP (ref 5–17)
BUN SERPL-MCNC: 8 MG/DL — SIGNIFICANT CHANGE UP (ref 7–18)
CALCIUM SERPL-MCNC: 8 MG/DL — LOW (ref 8.4–10.5)
CHLORIDE SERPL-SCNC: 109 MMOL/L — HIGH (ref 96–108)
CO2 SERPL-SCNC: 25 MMOL/L — SIGNIFICANT CHANGE UP (ref 22–31)
CREAT SERPL-MCNC: 1.24 MG/DL — SIGNIFICANT CHANGE UP (ref 0.5–1.3)
GLUCOSE SERPL-MCNC: 95 MG/DL — SIGNIFICANT CHANGE UP (ref 70–99)
HCT VFR BLD CALC: 32.4 % — LOW (ref 34.5–45)
HGB BLD-MCNC: 9.8 G/DL — LOW (ref 11.5–15.5)
MCHC RBC-ENTMCNC: 25.3 PG — LOW (ref 27–34)
MCHC RBC-ENTMCNC: 30.2 GM/DL — LOW (ref 32–36)
MCV RBC AUTO: 83.8 FL — SIGNIFICANT CHANGE UP (ref 80–100)
PLATELET # BLD AUTO: 321 K/UL — SIGNIFICANT CHANGE UP (ref 150–400)
POTASSIUM SERPL-MCNC: 4.2 MMOL/L — SIGNIFICANT CHANGE UP (ref 3.5–5.3)
POTASSIUM SERPL-SCNC: 4.2 MMOL/L — SIGNIFICANT CHANGE UP (ref 3.5–5.3)
RBC # BLD: 3.86 M/UL — SIGNIFICANT CHANGE UP (ref 3.8–5.2)
RBC # FLD: 19.1 % — HIGH (ref 10.3–14.5)
SODIUM SERPL-SCNC: 141 MMOL/L — SIGNIFICANT CHANGE UP (ref 135–145)
WBC # BLD: 8.9 K/UL — SIGNIFICANT CHANGE UP (ref 3.8–10.5)
WBC # FLD AUTO: 8.9 K/UL — SIGNIFICANT CHANGE UP (ref 3.8–10.5)

## 2018-08-06 PROCEDURE — 99233 SBSQ HOSP IP/OBS HIGH 50: CPT | Mod: GC

## 2018-08-06 PROCEDURE — 99232 SBSQ HOSP IP/OBS MODERATE 35: CPT

## 2018-08-06 PROCEDURE — 74245: CPT | Mod: 26

## 2018-08-06 RX ORDER — LISINOPRIL 2.5 MG/1
40 TABLET ORAL DAILY
Qty: 0 | Refills: 0 | Status: DISCONTINUED | OUTPATIENT
Start: 2018-08-06 | End: 2018-08-09

## 2018-08-06 RX ORDER — ACETAMINOPHEN 500 MG
650 TABLET ORAL ONCE
Qty: 0 | Refills: 0 | Status: COMPLETED | OUTPATIENT
Start: 2018-08-06 | End: 2018-08-06

## 2018-08-06 RX ADMIN — ATORVASTATIN CALCIUM 40 MILLIGRAM(S): 80 TABLET, FILM COATED ORAL at 21:30

## 2018-08-06 RX ADMIN — PANTOPRAZOLE SODIUM 40 MILLIGRAM(S): 20 TABLET, DELAYED RELEASE ORAL at 06:07

## 2018-08-06 RX ADMIN — Medication 650 MILLIGRAM(S): at 23:35

## 2018-08-06 RX ADMIN — Medication 10 MILLIGRAM(S): at 21:30

## 2018-08-06 RX ADMIN — PANTOPRAZOLE SODIUM 40 MILLIGRAM(S): 20 TABLET, DELAYED RELEASE ORAL at 17:28

## 2018-08-06 RX ADMIN — Medication 10 MILLIGRAM(S): at 06:06

## 2018-08-06 NOTE — PROGRESS NOTE ADULT - SUBJECTIVE AND OBJECTIVE BOX
Patient is a 89y old  Female who presents with a chief complaint of syncope and anemia (03 Aug 2018 17:05).       INTERVAL HPI/OVERNIGHT EVENTS:  T(C): 36.6 (08-06-18 @ 04:30), Max: 36.8 (08-05-18 @ 20:16)  HR: 94 (08-06-18 @ 04:30) (73 - 94)  BP: 169/82 (08-06-18 @ 04:30) (145/63 - 169/82)  RR: 17 (08-06-18 @ 04:30) (17 - 18)  SpO2: 99% (08-06-18 @ 04:30) (99% - 100%)  Wt(kg): --  I&O's Summary      REVIEW OF SYSTEMS: denies fever, chills, SOB, palpitations, chest pain, abdominal pain, nausea, vomitting, diarrhea, constipation, dizziness    MEDICATIONS  (STANDING):  atorvastatin 40 milliGRAM(s) Oral at bedtime  dextrose 5% + sodium chloride 0.9%. 1000 milliLiter(s) (75 mL/Hr) IV Continuous <Continuous>  donepezil 10 milliGRAM(s) Oral daily  insulin lispro (HumaLOG) corrective regimen sliding scale   SubCutaneous every 6 hours  metoclopramide 10 milliGRAM(s) Oral three times a day  oxybutynin 10 milliGRAM(s) Oral daily  pantoprazole  Injectable 40 milliGRAM(s) IV Push every 12 hours    MEDICATIONS  (PRN):      PHYSICAL EXAM:  GENERAL: NAD, well-groomed, well-developed  HEAD:  Atraumatic, Normocephalic  EYES: EOMI, PERRLA, conjunctiva and sclera clear  ENMT: No tonsillar erythema, exudates, or enlargement; Moist mucous membranes, Good dentition, No lesions  NECK: Supple, No JVD, Normal thyroid  NERVOUS SYSTEM:  Alert & Oriented X3, Good concentration; Motor Strength 5/5 B/L upper and lower extremities; DTRs 2+ intact and symmetric  CHEST/LUNG: Clear to percussion bilaterally; No rales, rhonchi, wheezing, or rubs  HEART: Regular rate and rhythm; No murmurs, rubs, or gallops  ABDOMEN: Soft, Nontender, Nondistended; Bowel sounds present  EXTREMITIES:  2+ Peripheral Pulses, No clubbing, cyanosis, or edema  LYMPH: No lymphadenopathy noted  SKIN: No rashes or lesions  LABS:                        9.8    8.9   )-----------( 321      ( 06 Aug 2018 07:25 )             32.4     08-06    141  |  109<H>  |  8   ----------------------------<  95  4.2   |  25  |  1.24    Ca    8.0<L>      06 Aug 2018 07:25          CAPILLARY BLOOD GLUCOSE      POCT Blood Glucose.: 94 mg/dL (06 Aug 2018 05:56)  POCT Blood Glucose.: 111 mg/dL (05 Aug 2018 23:49)  POCT Blood Glucose.: 103 mg/dL (05 Aug 2018 21:01)  POCT Blood Glucose.: 130 mg/dL (05 Aug 2018 16:20)  POCT Blood Glucose.: 175 mg/dL (05 Aug 2018 11:46) Patient is a 89y old  Female who presents with a chief complaint of syncope and anemia (03 Aug 2018 17:05).     Patient was seen and examined, denies pain or discomfort. Denies fever, chills, SOB, palpitations, chest pain, nausea, vomiting, diarrhea or constipation. Is NPO for upper GI series to r/o dysmotility disorder.    INTERVAL HPI/OVERNIGHT EVENTS:  T(C): 36.6 (08-06-18 @ 04:30), Max: 36.8 (08-05-18 @ 20:16)  HR: 94 (08-06-18 @ 04:30) (73 - 94)  BP: 169/82 (08-06-18 @ 04:30) (145/63 - 169/82)  RR: 17 (08-06-18 @ 04:30) (17 - 18)  SpO2: 99% (08-06-18 @ 04:30) (99% - 100%)    MEDICATIONS  (STANDING):  atorvastatin 40 milliGRAM(s) Oral at bedtime  dextrose 5% + sodium chloride 0.9%. 1000 milliLiter(s) (75 mL/Hr) IV Continuous <Continuous>  donepezil 10 milliGRAM(s) Oral daily  insulin lispro (HumaLOG) corrective regimen sliding scale   SubCutaneous every 6 hours  metoclopramide 10 milliGRAM(s) Oral three times a day  oxybutynin 10 milliGRAM(s) Oral daily  pantoprazole  Injectable 40 milliGRAM(s) IV Push every 12 hours    MEDICATIONS  (PRN):    PHYSICAL EXAM:  GENERAL: NAD, well-groomed, well-developed  HEAD:  Atraumatic, Normocephalic  EYES: EOMI, PERRLA, conjunctiva and sclera clear  ENMT: No tonsillar erythema, exudates, or enlargement; Moist mucous membranes,   NECK: Supple, No JVD, Normal thyroid  NERVOUS SYSTEM:  Alert & Oriented X2, Motor Strength 5/5 B/L upper and lower extremities; DTRs 2+ intact and symmetric  CHEST/LUNG: Clear to percussion bilaterally; No rales, rhonchi, wheezing, or rubs  HEART: Regular rate and rhythm; No murmurs, rubs, or gallops  ABDOMEN: Soft, Nontender, Nondistended; Bowel sounds present  EXTREMITIES:  2+ Peripheral Pulses, No clubbing, cyanosis, or edema  SKIN: No rashes or lesions    LABS:                        9.8    8.9   )-----------( 321      ( 06 Aug 2018 07:25 )             32.4     08-06    141  |  109<H>  |  8   ----------------------------<  95  4.2   |  25  |  1.24    Ca    8.0<L>      06 Aug 2018 07:25    POCT Blood Glucose.: 94 mg/dL (06 Aug 2018 05:56)  POCT Blood Glucose.: 111 mg/dL (05 Aug 2018 23:49)  POCT Blood Glucose.: 103 mg/dL (05 Aug 2018 21:01)  POCT Blood Glucose.: 130 mg/dL (05 Aug 2018 16:20)  POCT Blood Glucose.: 175 mg/dL (05 Aug 2018 11:46)

## 2018-08-06 NOTE — PROGRESS NOTE ADULT - PROBLEM SELECTOR PLAN 2
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

## 2018-08-06 NOTE — PROGRESS NOTE ADULT - ASSESSMENT
Mrs. Toya Rosenberg is a 89 year old female with PMH of dementia, HTN and diabetes presenting after a syncopal episode lasting 5-7 minutes while her HHA was bathing her. Admitted for syncope.

## 2018-08-06 NOTE — PROGRESS NOTE ADULT - PROBLEM SELECTOR PLAN 1
had bezoar of food w/in stomach  no tenderness or distention on abdominal exam  r/o motility dysfunction , c/w reglan  scheduled for UGI w/ small bowel series  iv fluids while npo

## 2018-08-06 NOTE — PROGRESS NOTE ADULT - ASSESSMENT
Awaiting Small bowel series to confirm passage of food bezoar.   EGD EUS outpatient depending on families wishes.

## 2018-08-06 NOTE — PROGRESS NOTE ADULT - SUBJECTIVE AND OBJECTIVE BOX
Subjective:   No complaints     Objective:    MEDICATIONS  (STANDING):  atorvastatin 40 milliGRAM(s) Oral at bedtime  dextrose 5% + sodium chloride 0.9%. 1000 milliLiter(s) (75 mL/Hr) IV Continuous <Continuous>  donepezil 10 milliGRAM(s) Oral daily  insulin lispro (HumaLOG) corrective regimen sliding scale   SubCutaneous every 6 hours  lisinopril 40 milliGRAM(s) Oral daily  metoclopramide 10 milliGRAM(s) Oral three times a day  oxybutynin 10 milliGRAM(s) Oral daily  pantoprazole  Injectable 40 milliGRAM(s) IV Push every 12 hours    MEDICATIONS  (PRN):              Vital Signs Last 24 Hrs  T(C): 36.6 (06 Aug 2018 04:30), Max: 36.8 (05 Aug 2018 20:16)  T(F): 97.8 (06 Aug 2018 04:30), Max: 98.2 (05 Aug 2018 20:16)  HR: 94 (06 Aug 2018 04:30) (73 - 94)  BP: 169/82 (06 Aug 2018 04:30) (150/61 - 169/82)  BP(mean): --  RR: 17 (06 Aug 2018 04:30) (17 - 18)  SpO2: 99% (06 Aug 2018 04:30) (99% - 100%)      General:  Well developed, well nourished, alert and active, no pallor, NAD.  HEENT:    Normal appearance of conjunctiva, ears, nose, lips, oropharynx, and oral mucosa, anicteric.  Neck:  No masses, no asymmetry.  Lymph Nodes:  No lymphadenopathy.   Cardiovascular:  RRR normal S1/S2, no murmur.  Respiratory:  CTA B/L, normal respiratory effort.   Abdominal:   soft, no masses or tenderness, normoactive BS, NT/ND, no HSM.        LABS:                        9.8    8.9   )-----------( 321      ( 06 Aug 2018 07:25 )             32.4     08-06    141  |  109<H>  |  8   ----------------------------<  95  4.2   |  25  |  1.24    Ca    8.0<L>      06 Aug 2018 07:25            RADIOLOGY & ADDITIONAL TESTS:

## 2018-08-06 NOTE — PROGRESS NOTE ADULT - PROBLEM SELECTOR PLAN 3
labs consistent with:  IRON DEFICIENCY ANEMIA  though suspect she may have some component of anemia of chronic disease  on protonix iv BID, will switch to po after upper GI series  -Hgb 7.1 on admission, now 9.8   s/p 2 units PRBCs w/ noted stability  monitor H/H  -no pharmacologic dvt ppx, due to concern for blood loss

## 2018-08-07 LAB
ALBUMIN SERPL ELPH-MCNC: 2.3 G/DL — LOW (ref 3.5–5)
ALP SERPL-CCNC: 67 U/L — SIGNIFICANT CHANGE UP (ref 40–120)
ALT FLD-CCNC: 18 U/L DA — SIGNIFICANT CHANGE UP (ref 10–60)
ANION GAP SERPL CALC-SCNC: 8 MMOL/L — SIGNIFICANT CHANGE UP (ref 5–17)
AST SERPL-CCNC: 29 U/L — SIGNIFICANT CHANGE UP (ref 10–40)
BASOPHILS # BLD AUTO: 0.1 K/UL — SIGNIFICANT CHANGE UP (ref 0–0.2)
BASOPHILS NFR BLD AUTO: 1.2 % — SIGNIFICANT CHANGE UP (ref 0–2)
BILIRUB SERPL-MCNC: 0.3 MG/DL — SIGNIFICANT CHANGE UP (ref 0.2–1.2)
BUN SERPL-MCNC: 13 MG/DL — SIGNIFICANT CHANGE UP (ref 7–18)
CALCIUM SERPL-MCNC: 8.2 MG/DL — LOW (ref 8.4–10.5)
CHLORIDE SERPL-SCNC: 110 MMOL/L — HIGH (ref 96–108)
CO2 SERPL-SCNC: 25 MMOL/L — SIGNIFICANT CHANGE UP (ref 22–31)
CREAT SERPL-MCNC: 1.26 MG/DL — SIGNIFICANT CHANGE UP (ref 0.5–1.3)
EOSINOPHIL # BLD AUTO: 0.1 K/UL — SIGNIFICANT CHANGE UP (ref 0–0.5)
EOSINOPHIL NFR BLD AUTO: 0.7 % — SIGNIFICANT CHANGE UP (ref 0–6)
GLUCOSE SERPL-MCNC: 86 MG/DL — SIGNIFICANT CHANGE UP (ref 70–99)
HCT VFR BLD CALC: 33.4 % — LOW (ref 34.5–45)
HGB BLD-MCNC: 9.9 G/DL — LOW (ref 11.5–15.5)
LYMPHOCYTES # BLD AUTO: 1.7 K/UL — SIGNIFICANT CHANGE UP (ref 1–3.3)
LYMPHOCYTES # BLD AUTO: 20 % — SIGNIFICANT CHANGE UP (ref 13–44)
MAGNESIUM SERPL-MCNC: 1.9 MG/DL — SIGNIFICANT CHANGE UP (ref 1.6–2.6)
MCHC RBC-ENTMCNC: 24.8 PG — LOW (ref 27–34)
MCHC RBC-ENTMCNC: 29.8 GM/DL — LOW (ref 32–36)
MCV RBC AUTO: 83.3 FL — SIGNIFICANT CHANGE UP (ref 80–100)
MONOCYTES # BLD AUTO: 0.8 K/UL — SIGNIFICANT CHANGE UP (ref 0–0.9)
MONOCYTES NFR BLD AUTO: 9.8 % — SIGNIFICANT CHANGE UP (ref 2–14)
NEUTROPHILS # BLD AUTO: 5.7 K/UL — SIGNIFICANT CHANGE UP (ref 1.8–7.4)
NEUTROPHILS NFR BLD AUTO: 68.4 % — SIGNIFICANT CHANGE UP (ref 43–77)
PHOSPHATE SERPL-MCNC: 2 MG/DL — LOW (ref 2.5–4.5)
PLATELET # BLD AUTO: 360 K/UL — SIGNIFICANT CHANGE UP (ref 150–400)
POTASSIUM SERPL-MCNC: 4 MMOL/L — SIGNIFICANT CHANGE UP (ref 3.5–5.3)
POTASSIUM SERPL-SCNC: 4 MMOL/L — SIGNIFICANT CHANGE UP (ref 3.5–5.3)
PROT SERPL-MCNC: 7.2 G/DL — SIGNIFICANT CHANGE UP (ref 6–8.3)
RBC # BLD: 4.01 M/UL — SIGNIFICANT CHANGE UP (ref 3.8–5.2)
RBC # FLD: 18.8 % — HIGH (ref 10.3–14.5)
SODIUM SERPL-SCNC: 143 MMOL/L — SIGNIFICANT CHANGE UP (ref 135–145)
WBC # BLD: 8.3 K/UL — SIGNIFICANT CHANGE UP (ref 3.8–10.5)
WBC # FLD AUTO: 8.3 K/UL — SIGNIFICANT CHANGE UP (ref 3.8–10.5)

## 2018-08-07 PROCEDURE — 99232 SBSQ HOSP IP/OBS MODERATE 35: CPT | Mod: GC

## 2018-08-07 RX ORDER — PANTOPRAZOLE SODIUM 20 MG/1
40 TABLET, DELAYED RELEASE ORAL
Qty: 0 | Refills: 0 | Status: DISCONTINUED | OUTPATIENT
Start: 2018-08-07 | End: 2018-08-09

## 2018-08-07 RX ORDER — PANTOPRAZOLE SODIUM 20 MG/1
1 TABLET, DELAYED RELEASE ORAL
Qty: 60 | Refills: 0 | OUTPATIENT
Start: 2018-08-07 | End: 2018-09-05

## 2018-08-07 RX ORDER — CELECOXIB 200 MG/1
1 CAPSULE ORAL
Qty: 0 | Refills: 0 | COMMUNITY

## 2018-08-07 RX ORDER — OMEPRAZOLE 10 MG/1
1 CAPSULE, DELAYED RELEASE ORAL
Qty: 0 | Refills: 0 | COMMUNITY

## 2018-08-07 RX ORDER — HEPARIN SODIUM 5000 [USP'U]/ML
5000 INJECTION INTRAVENOUS; SUBCUTANEOUS EVERY 12 HOURS
Qty: 0 | Refills: 0 | Status: DISCONTINUED | OUTPATIENT
Start: 2018-08-07 | End: 2018-08-09

## 2018-08-07 RX ORDER — ACETAMINOPHEN 500 MG
650 TABLET ORAL EVERY 6 HOURS
Qty: 0 | Refills: 0 | Status: DISCONTINUED | OUTPATIENT
Start: 2018-08-07 | End: 2018-08-09

## 2018-08-07 RX ORDER — METOCLOPRAMIDE HCL 10 MG
1 TABLET ORAL
Qty: 42 | Refills: 0 | OUTPATIENT
Start: 2018-08-07 | End: 2018-08-20

## 2018-08-07 RX ADMIN — PANTOPRAZOLE SODIUM 40 MILLIGRAM(S): 20 TABLET, DELAYED RELEASE ORAL at 17:05

## 2018-08-07 RX ADMIN — DONEPEZIL HYDROCHLORIDE 10 MILLIGRAM(S): 10 TABLET, FILM COATED ORAL at 11:26

## 2018-08-07 RX ADMIN — Medication 10 MILLIGRAM(S): at 11:26

## 2018-08-07 RX ADMIN — LISINOPRIL 40 MILLIGRAM(S): 2.5 TABLET ORAL at 06:38

## 2018-08-07 RX ADMIN — HEPARIN SODIUM 5000 UNIT(S): 5000 INJECTION INTRAVENOUS; SUBCUTANEOUS at 17:05

## 2018-08-07 RX ADMIN — Medication 10 MILLIGRAM(S): at 21:05

## 2018-08-07 RX ADMIN — Medication 10 MILLIGRAM(S): at 06:38

## 2018-08-07 RX ADMIN — Medication 650 MILLIGRAM(S): at 00:05

## 2018-08-07 RX ADMIN — Medication 650 MILLIGRAM(S): at 21:35

## 2018-08-07 RX ADMIN — Medication 650 MILLIGRAM(S): at 21:05

## 2018-08-07 RX ADMIN — PANTOPRAZOLE SODIUM 40 MILLIGRAM(S): 20 TABLET, DELAYED RELEASE ORAL at 06:38

## 2018-08-07 RX ADMIN — ATORVASTATIN CALCIUM 40 MILLIGRAM(S): 80 TABLET, FILM COATED ORAL at 21:05

## 2018-08-07 RX ADMIN — Medication 10 MILLIGRAM(S): at 13:13

## 2018-08-07 NOTE — PROGRESS NOTE ADULT - SUBJECTIVE AND OBJECTIVE BOX
Patient is a 89y old  Female who presents with a chief complaint of syncope and anemia (03 Aug 2018 17:05)    Patient was seen and examined, no complaints. Denies fever, chills, SOB, palpitations, chest pain, nausea, vomiting, diarrhea or constipation.     INTERVAL HPI/OVERNIGHT EVENTS:  T(C): 37.1 (08-07-18 @ 05:44), Max: 37.2 (08-06-18 @ 19:50)  HR: 92 (08-07-18 @ 05:44) (92 - 97)  BP: 155/79 (08-07-18 @ 05:44) (136/62 - 155/79)  RR: 18 (08-07-18 @ 05:44) (18 - 18)  SpO2: 95% (08-07-18 @ 05:44) (95% - 96%)  Wt(kg): --  I&O's Summary    06 Aug 2018 07:01  -  07 Aug 2018 07:00  --------------------------------------------------------  IN: 300 mL / OUT: 0 mL / NET: 300 mL    MEDICATIONS  (STANDING):  atorvastatin 40 milliGRAM(s) Oral at bedtime  donepezil 10 milliGRAM(s) Oral daily  insulin lispro (HumaLOG) corrective regimen sliding scale   SubCutaneous every 6 hours  lisinopril 40 milliGRAM(s) Oral daily  metoclopramide 10 milliGRAM(s) Oral three times a day  oxybutynin 10 milliGRAM(s) Oral daily  pantoprazole  Injectable 40 milliGRAM(s) IV Push every 12 hours    PHYSICAL EXAM:  GENERAL: NAD, well-groomed, well-developed  HEAD:  Atraumatic, Normocephalic  EYES: EOMI, PERRLA, conjunctiva and sclera clear  ENMT: No tonsillar erythema, exudates, or enlargement; Moist mucous membranes, Good dentition, No lesions  NECK: Supple, No JVD, Normal thyroid  NERVOUS SYSTEM:  Alert & Oriented X2, Good concentration; Motor Strength 5/5 B/L upper and lower extremities; DTRs 2+ intact and symmetric  CHEST/LUNG: Clear to percussion bilaterally; No rales, rhonchi, wheezing, or rubs  HEART: Regular rate and rhythm; No murmurs, rubs, or gallops  ABDOMEN: Soft, Nontender, Nondistended; Bowel sounds present  EXTREMITIES:  2+ Peripheral Pulses, No clubbing, cyanosis, or edema  LYMPH: No lymphadenopathy noted  SKIN: No rashes or lesions    LABS:                        9.9    8.3   )-----------( 360      ( 07 Aug 2018 08:26 )             33.4     08-07    143  |  110<H>  |  13  ----------------------------<  86  4.0   |  25  |  1.26    Ca    8.2<L>      07 Aug 2018 08:26  Phos  2.0     08-07  Mg     1.9     08-07    TPro  7.2  /  Alb  2.3<L>  /  TBili  0.3  /  DBili  x   /  AST  29  /  ALT  18  /  AlkPhos  67  08-07    POCT Blood Glucose.: 100 mg/dL (07 Aug 2018 06:33)  POCT Blood Glucose.: 118 mg/dL (07 Aug 2018 00:14)  POCT Blood Glucose.: 84 mg/dL (06 Aug 2018 18:02)  POCT Blood Glucose.: 99 mg/dL (06 Aug 2018 11:41)    < from: Xray Upper GI + Small Bowel Series (08.06.18 @ 15:00) >  IMPRESSION:   Tertiary contractions in the esophagus. No evidence for esophageal,   gastric or small bowel dysmotility.    Dilated gastric antrum with apparent mucosal irregularity which may be   spurious due to single contrast; correlate with endoscopy.    Small sliding hiatal hernia.    Moderate GE reflux.    < end of copied text >

## 2018-08-07 NOTE — PROGRESS NOTE ADULT - PROBLEM SELECTOR PLAN 1
had bezoar of food w/in stomach  no tenderness or distention on abdominal exam  no dysmotility on upper GI series  c/w reglan  GI consult- Dr Garzon, outpatient EUS and EGD

## 2018-08-07 NOTE — PROGRESS NOTE ADULT - PROBLEM SELECTOR PLAN 3
labs consistent with:  IRON DEFICIENCY ANEMIA  though suspect she may have some component of anemia of chronic disease  po protonix BID  -Hgb 7.1 on admission, now 9.8   s/p 2 units PRBCs w/ noted stability  monitor H/H  -no pharmacologic dvt ppx, due to concern for blood loss

## 2018-08-07 NOTE — PROGRESS NOTE ADULT - PROBLEM SELECTOR PLAN 2
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

## 2018-08-08 LAB
ANION GAP SERPL CALC-SCNC: 10 MMOL/L — SIGNIFICANT CHANGE UP (ref 5–17)
BUN SERPL-MCNC: 19 MG/DL — HIGH (ref 7–18)
CALCIUM SERPL-MCNC: 8.3 MG/DL — LOW (ref 8.4–10.5)
CHLORIDE SERPL-SCNC: 108 MMOL/L — SIGNIFICANT CHANGE UP (ref 96–108)
CO2 SERPL-SCNC: 23 MMOL/L — SIGNIFICANT CHANGE UP (ref 22–31)
CREAT SERPL-MCNC: 1.18 MG/DL — SIGNIFICANT CHANGE UP (ref 0.5–1.3)
GLUCOSE SERPL-MCNC: 88 MG/DL — SIGNIFICANT CHANGE UP (ref 70–99)
HCT VFR BLD CALC: 31.3 % — LOW (ref 34.5–45)
HGB BLD-MCNC: 9.7 G/DL — LOW (ref 11.5–15.5)
MAGNESIUM SERPL-MCNC: 1.9 MG/DL — SIGNIFICANT CHANGE UP (ref 1.6–2.6)
MCHC RBC-ENTMCNC: 25.8 PG — LOW (ref 27–34)
MCHC RBC-ENTMCNC: 31 GM/DL — LOW (ref 32–36)
MCV RBC AUTO: 83.3 FL — SIGNIFICANT CHANGE UP (ref 80–100)
PHOSPHATE SERPL-MCNC: 2.3 MG/DL — LOW (ref 2.5–4.5)
PLATELET # BLD AUTO: 361 K/UL — SIGNIFICANT CHANGE UP (ref 150–400)
POTASSIUM SERPL-MCNC: 4 MMOL/L — SIGNIFICANT CHANGE UP (ref 3.5–5.3)
POTASSIUM SERPL-SCNC: 4 MMOL/L — SIGNIFICANT CHANGE UP (ref 3.5–5.3)
RBC # BLD: 3.76 M/UL — LOW (ref 3.8–5.2)
RBC # FLD: 18.9 % — HIGH (ref 10.3–14.5)
SODIUM SERPL-SCNC: 141 MMOL/L — SIGNIFICANT CHANGE UP (ref 135–145)
WBC # BLD: 9.8 K/UL — SIGNIFICANT CHANGE UP (ref 3.8–10.5)
WBC # FLD AUTO: 9.8 K/UL — SIGNIFICANT CHANGE UP (ref 3.8–10.5)

## 2018-08-08 PROCEDURE — 99233 SBSQ HOSP IP/OBS HIGH 50: CPT | Mod: GC

## 2018-08-08 PROCEDURE — 99232 SBSQ HOSP IP/OBS MODERATE 35: CPT

## 2018-08-08 RX ORDER — ACETAMINOPHEN 500 MG
2 TABLET ORAL
Qty: 0 | Refills: 0 | COMMUNITY
Start: 2018-08-08

## 2018-08-08 RX ADMIN — LISINOPRIL 40 MILLIGRAM(S): 2.5 TABLET ORAL at 05:13

## 2018-08-08 RX ADMIN — Medication 10 MILLIGRAM(S): at 13:18

## 2018-08-08 RX ADMIN — Medication 650 MILLIGRAM(S): at 23:33

## 2018-08-08 RX ADMIN — PANTOPRAZOLE SODIUM 40 MILLIGRAM(S): 20 TABLET, DELAYED RELEASE ORAL at 17:03

## 2018-08-08 RX ADMIN — HEPARIN SODIUM 5000 UNIT(S): 5000 INJECTION INTRAVENOUS; SUBCUTANEOUS at 05:13

## 2018-08-08 RX ADMIN — DONEPEZIL HYDROCHLORIDE 10 MILLIGRAM(S): 10 TABLET, FILM COATED ORAL at 11:05

## 2018-08-08 RX ADMIN — Medication 10 MILLIGRAM(S): at 11:05

## 2018-08-08 RX ADMIN — Medication 10 MILLIGRAM(S): at 21:57

## 2018-08-08 RX ADMIN — HEPARIN SODIUM 5000 UNIT(S): 5000 INJECTION INTRAVENOUS; SUBCUTANEOUS at 17:03

## 2018-08-08 RX ADMIN — ATORVASTATIN CALCIUM 40 MILLIGRAM(S): 80 TABLET, FILM COATED ORAL at 21:57

## 2018-08-08 RX ADMIN — Medication 10 MILLIGRAM(S): at 05:13

## 2018-08-08 RX ADMIN — PANTOPRAZOLE SODIUM 40 MILLIGRAM(S): 20 TABLET, DELAYED RELEASE ORAL at 05:13

## 2018-08-08 NOTE — PROGRESS NOTE ADULT - SUBJECTIVE AND OBJECTIVE BOX
Patient is a 89y old  Female who presents with a chief complaint of syncope and anemia (03 Aug 2018 17:05)    Patient was seen and examined, no complaints. Denies fever, chills, SOB, palpitations, chest pain, abdominal pain, nausea, vomitting, diarrhea, constipation, dizziness. Pending authorization for [facility].      INTERVAL HPI/OVERNIGHT EVENTS:  T(C): 36.2 (08-08-18 @ 05:10), Max: 37.1 (08-07-18 @ 14:45)  HR: 78 (08-08-18 @ 05:10) (78 - 85)  BP: 131/72 (08-08-18 @ 05:10) (131/72 - 146/65)  RR: 16 (08-08-18 @ 05:10) (16 - 18)  SpO2: 100% (08-08-18 @ 05:10) (99% - 100%)    MEDICATIONS  (STANDING):  atorvastatin 40 milliGRAM(s) Oral at bedtime  donepezil 10 milliGRAM(s) Oral daily  heparin  Injectable 5000 Unit(s) SubCutaneous every 12 hours  insulin lispro (HumaLOG) corrective regimen sliding scale   SubCutaneous every 6 hours  lisinopril 40 milliGRAM(s) Oral daily  metoclopramide 10 milliGRAM(s) Oral three times a day  oxybutynin 10 milliGRAM(s) Oral daily  pantoprazole    Tablet 40 milliGRAM(s) Oral two times a day    MEDICATIONS  (PRN):  acetaminophen   Tablet. 650 milliGRAM(s) Oral every 6 hours PRN Mild Pain (1 - 3)      PHYSICAL EXAM:  GENERAL: NAD, well-groomed, well-developed  HEAD:  Atraumatic, Normocephalic  EYES: EOMI, PERRLA, conjunctiva and sclera clear  ENMT: No tonsillar erythema, exudates, or enlargement; Moist mucous membranes  NECK: Supple, No JVD, Normal thyroid  NERVOUS SYSTEM:  Alert & Oriented X2	, Motor Strength 5/5 B/L upper and lower extremities; DTRs 2+ intact and symmetric  CHEST/LUNG: Clear to percussion bilaterally; No rales, rhonchi, wheezing, or rubs  HEART: Regular rate and rhythm; No murmurs, rubs, or gallops  ABDOMEN: Soft, Nontender, Nondistended; Bowel sounds present  EXTREMITIES:  2+ Peripheral Pulses, No clubbing, cyanosis, or edema  LYMPH: No lymphadenopathy noted  SKIN: No rashes or lesions    LABS:                        9.7    9.8   )-----------( 361      ( 08 Aug 2018 07:37 )             31.3     08-08    141  |  108  |  19<H>  ----------------------------<  88  4.0   |  23  |  1.18    Ca    8.3<L>      08 Aug 2018 07:37  Phos  2.3     08-08  Mg     1.9     08-08    TPro  7.2  /  Alb  2.3<L>  /  TBili  0.3  /  DBili  x   /  AST  29  /  ALT  18  /  AlkPhos  67  08-07    POCT Blood Glucose.: 94 mg/dL (08 Aug 2018 05:54)  POCT Blood Glucose.: 81 mg/dL (07 Aug 2018 23:46)  POCT Blood Glucose.: 89 mg/dL (07 Aug 2018 17:59)  POCT Blood Glucose.: 113 mg/dL (07 Aug 2018 12:04)

## 2018-08-08 NOTE — PROGRESS NOTE ADULT - NSHPATTENDINGPLANDISCUSS_GEN_ALL_CORE
Dr. Carlisle, Dr. Salgado, patient, [facility], GI, Radiology, IR.
Dr. Carlisle, Dr. Salgado, patient, [facility], GI, Radiology, IR.
Dr. Carlisle, grandson.
Dr. Carlisle, Dr. Salgado, patient, [facility], GI, Radiology, IR.
Dr. Lou.
Patient, family, Dr. Lou, Case Management
steven Jacobs, GI.
Patient, grandson, Dr. Lou.

## 2018-08-08 NOTE — PROGRESS NOTE ADULT - SUBJECTIVE AND OBJECTIVE BOX
Subjective:   No complaints     Objective:    MEDICATIONS  (STANDING):  atorvastatin 40 milliGRAM(s) Oral at bedtime  donepezil 10 milliGRAM(s) Oral daily  heparin  Injectable 5000 Unit(s) SubCutaneous every 12 hours  insulin lispro (HumaLOG) corrective regimen sliding scale   SubCutaneous every 6 hours  lisinopril 40 milliGRAM(s) Oral daily  metoclopramide 10 milliGRAM(s) Oral three times a day  oxybutynin 10 milliGRAM(s) Oral daily  pantoprazole    Tablet 40 milliGRAM(s) Oral two times a day    MEDICATIONS  (PRN):  acetaminophen   Tablet. 650 milliGRAM(s) Oral every 6 hours PRN Mild Pain (1 - 3)              Vital Signs Last 24 Hrs  T(C): 36.2 (08 Aug 2018 05:10), Max: 37.1 (07 Aug 2018 14:45)  T(F): 97.2 (08 Aug 2018 05:10), Max: 98.7 (07 Aug 2018 14:45)  HR: 81 (08 Aug 2018 11:33) (78 - 85)  BP: 126/66 (08 Aug 2018 11:33) (126/66 - 146/65)  BP(mean): --  RR: 16 (08 Aug 2018 05:10) (16 - 18)  SpO2: 100% (08 Aug 2018 05:10) (99% - 100%)      General:  Well developed, well nourished, alert and active, no pallor, NAD.  Respiratory:  CTA B/L, normal respiratory effort.   Abdominal:   soft, no masses or tenderness, normoactive BS, NT/ND, no HSM.      LABS:                        9.7    9.8   )-----------( 361      ( 08 Aug 2018 07:37 )             31.3     08-08    141  |  108  |  19<H>  ----------------------------<  88  4.0   |  23  |  1.18    Ca    8.3<L>      08 Aug 2018 07:37  Phos  2.3     08-08  Mg     1.9     08-08    TPro  7.2  /  Alb  2.3<L>  /  TBili  0.3  /  DBili  x   /  AST  29  /  ALT  18  /  AlkPhos  67  08-07          RADIOLOGY & ADDITIONAL TESTS:

## 2018-08-08 NOTE — PROGRESS NOTE ADULT - ASSESSMENT
Upper Gi series reviewed   Patient tolerating PO  Continue Diet   Outpatient workup of lymph node found on CT scan if family agrees

## 2018-08-09 VITALS — SYSTOLIC BLOOD PRESSURE: 134 MMHG | DIASTOLIC BLOOD PRESSURE: 57 MMHG | HEART RATE: 73 BPM | TEMPERATURE: 99 F

## 2018-08-09 LAB
ANION GAP SERPL CALC-SCNC: 8 MMOL/L — SIGNIFICANT CHANGE UP (ref 5–17)
BUN SERPL-MCNC: 19 MG/DL — HIGH (ref 7–18)
CALCIUM SERPL-MCNC: 8.6 MG/DL — SIGNIFICANT CHANGE UP (ref 8.4–10.5)
CHLORIDE SERPL-SCNC: 106 MMOL/L — SIGNIFICANT CHANGE UP (ref 96–108)
CO2 SERPL-SCNC: 25 MMOL/L — SIGNIFICANT CHANGE UP (ref 22–31)
CREAT SERPL-MCNC: 1.08 MG/DL — SIGNIFICANT CHANGE UP (ref 0.5–1.3)
GLUCOSE SERPL-MCNC: 113 MG/DL — HIGH (ref 70–99)
HCT VFR BLD CALC: 31.7 % — LOW (ref 34.5–45)
HGB BLD-MCNC: 9.6 G/DL — LOW (ref 11.5–15.5)
MCHC RBC-ENTMCNC: 25.3 PG — LOW (ref 27–34)
MCHC RBC-ENTMCNC: 30.4 GM/DL — LOW (ref 32–36)
MCV RBC AUTO: 83.2 FL — SIGNIFICANT CHANGE UP (ref 80–100)
PLATELET # BLD AUTO: 346 K/UL — SIGNIFICANT CHANGE UP (ref 150–400)
POTASSIUM SERPL-MCNC: 3.8 MMOL/L — SIGNIFICANT CHANGE UP (ref 3.5–5.3)
POTASSIUM SERPL-SCNC: 3.8 MMOL/L — SIGNIFICANT CHANGE UP (ref 3.5–5.3)
RBC # BLD: 3.82 M/UL — SIGNIFICANT CHANGE UP (ref 3.8–5.2)
RBC # FLD: 18.6 % — HIGH (ref 10.3–14.5)
SODIUM SERPL-SCNC: 139 MMOL/L — SIGNIFICANT CHANGE UP (ref 135–145)
WBC # BLD: 8.4 K/UL — SIGNIFICANT CHANGE UP (ref 3.8–10.5)
WBC # FLD AUTO: 8.4 K/UL — SIGNIFICANT CHANGE UP (ref 3.8–10.5)

## 2018-08-09 PROCEDURE — 36430 TRANSFUSION BLD/BLD COMPNT: CPT

## 2018-08-09 PROCEDURE — 86900 BLOOD TYPING SEROLOGIC ABO: CPT

## 2018-08-09 PROCEDURE — 86850 RBC ANTIBODY SCREEN: CPT

## 2018-08-09 PROCEDURE — 82962 GLUCOSE BLOOD TEST: CPT

## 2018-08-09 PROCEDURE — 85027 COMPLETE CBC AUTOMATED: CPT

## 2018-08-09 PROCEDURE — 99285 EMERGENCY DEPT VISIT HI MDM: CPT | Mod: 25

## 2018-08-09 PROCEDURE — 97116 GAIT TRAINING THERAPY: CPT

## 2018-08-09 PROCEDURE — 83036 HEMOGLOBIN GLYCOSYLATED A1C: CPT

## 2018-08-09 PROCEDURE — 84443 ASSAY THYROID STIM HORMONE: CPT

## 2018-08-09 PROCEDURE — 71045 X-RAY EXAM CHEST 1 VIEW: CPT

## 2018-08-09 PROCEDURE — 80053 COMPREHEN METABOLIC PANEL: CPT

## 2018-08-09 PROCEDURE — 84540 ASSAY OF URINE/UREA-N: CPT

## 2018-08-09 PROCEDURE — 74245: CPT

## 2018-08-09 PROCEDURE — 93306 TTE W/DOPPLER COMPLETE: CPT

## 2018-08-09 PROCEDURE — 82746 ASSAY OF FOLIC ACID SERUM: CPT

## 2018-08-09 PROCEDURE — 74160 CT ABDOMEN W/CONTRAST: CPT

## 2018-08-09 PROCEDURE — 99238 HOSP IP/OBS DSCHRG MGMT 30/<: CPT

## 2018-08-09 PROCEDURE — 82550 ASSAY OF CK (CPK): CPT

## 2018-08-09 PROCEDURE — 85730 THROMBOPLASTIN TIME PARTIAL: CPT

## 2018-08-09 PROCEDURE — P9040: CPT

## 2018-08-09 PROCEDURE — 84100 ASSAY OF PHOSPHORUS: CPT

## 2018-08-09 PROCEDURE — 97163 PT EVAL HIGH COMPLEX 45 MIN: CPT

## 2018-08-09 PROCEDURE — 83735 ASSAY OF MAGNESIUM: CPT

## 2018-08-09 PROCEDURE — 76705 ECHO EXAM OF ABDOMEN: CPT

## 2018-08-09 PROCEDURE — 93880 EXTRACRANIAL BILAT STUDY: CPT

## 2018-08-09 PROCEDURE — 93005 ELECTROCARDIOGRAM TRACING: CPT

## 2018-08-09 PROCEDURE — 86923 COMPATIBILITY TEST ELECTRIC: CPT

## 2018-08-09 PROCEDURE — 81001 URINALYSIS AUTO W/SCOPE: CPT

## 2018-08-09 PROCEDURE — 82553 CREATINE MB FRACTION: CPT

## 2018-08-09 PROCEDURE — 82607 VITAMIN B-12: CPT

## 2018-08-09 PROCEDURE — 86901 BLOOD TYPING SEROLOGIC RH(D): CPT

## 2018-08-09 PROCEDURE — 84484 ASSAY OF TROPONIN QUANT: CPT

## 2018-08-09 PROCEDURE — 80048 BASIC METABOLIC PNL TOTAL CA: CPT

## 2018-08-09 PROCEDURE — 85610 PROTHROMBIN TIME: CPT

## 2018-08-09 PROCEDURE — 82570 ASSAY OF URINE CREATININE: CPT

## 2018-08-09 PROCEDURE — 83935 ASSAY OF URINE OSMOLALITY: CPT

## 2018-08-09 PROCEDURE — 82272 OCCULT BLD FECES 1-3 TESTS: CPT

## 2018-08-09 PROCEDURE — 80061 LIPID PANEL: CPT

## 2018-08-09 PROCEDURE — 83550 IRON BINDING TEST: CPT

## 2018-08-09 PROCEDURE — 82728 ASSAY OF FERRITIN: CPT

## 2018-08-09 PROCEDURE — 84300 ASSAY OF URINE SODIUM: CPT

## 2018-08-09 PROCEDURE — 87086 URINE CULTURE/COLONY COUNT: CPT

## 2018-08-09 RX ORDER — PANTOPRAZOLE SODIUM 20 MG/1
1 TABLET, DELAYED RELEASE ORAL
Qty: 30 | Refills: 0 | OUTPATIENT
Start: 2018-08-09 | End: 2018-09-07

## 2018-08-09 RX ORDER — PANTOPRAZOLE SODIUM 20 MG/1
1 TABLET, DELAYED RELEASE ORAL
Qty: 60 | Refills: 0 | OUTPATIENT
Start: 2018-08-09 | End: 2018-09-07

## 2018-08-09 RX ORDER — OXYBUTYNIN CHLORIDE 5 MG
10 TABLET ORAL DAILY
Qty: 0 | Refills: 0 | Status: DISCONTINUED | OUTPATIENT
Start: 2018-08-09 | End: 2018-08-09

## 2018-08-09 RX ORDER — DONEPEZIL HYDROCHLORIDE 10 MG/1
10 TABLET, FILM COATED ORAL AT BEDTIME
Qty: 0 | Refills: 0 | Status: DISCONTINUED | OUTPATIENT
Start: 2018-08-09 | End: 2018-08-09

## 2018-08-09 RX ORDER — OXYBUTYNIN CHLORIDE 5 MG
1 TABLET ORAL
Qty: 0 | Refills: 0 | COMMUNITY
Start: 2018-08-09

## 2018-08-09 RX ADMIN — Medication 650 MILLIGRAM(S): at 00:03

## 2018-08-09 RX ADMIN — HEPARIN SODIUM 5000 UNIT(S): 5000 INJECTION INTRAVENOUS; SUBCUTANEOUS at 05:37

## 2018-08-09 RX ADMIN — Medication 650 MILLIGRAM(S): at 05:37

## 2018-08-09 RX ADMIN — Medication 10 MILLIGRAM(S): at 05:37

## 2018-08-09 RX ADMIN — PANTOPRAZOLE SODIUM 40 MILLIGRAM(S): 20 TABLET, DELAYED RELEASE ORAL at 17:40

## 2018-08-09 RX ADMIN — LISINOPRIL 40 MILLIGRAM(S): 2.5 TABLET ORAL at 05:37

## 2018-08-09 RX ADMIN — HEPARIN SODIUM 5000 UNIT(S): 5000 INJECTION INTRAVENOUS; SUBCUTANEOUS at 17:40

## 2018-08-09 RX ADMIN — Medication 10 MILLIGRAM(S): at 12:10

## 2018-08-09 RX ADMIN — Medication 10 MILLIGRAM(S): at 13:42

## 2018-08-09 RX ADMIN — PANTOPRAZOLE SODIUM 40 MILLIGRAM(S): 20 TABLET, DELAYED RELEASE ORAL at 05:37

## 2018-08-09 NOTE — PROGRESS NOTE ADULT - PROBLEM SELECTOR PLAN 4
no events on telemetry--> now off and transferred to non-telemetry floor  -carotid doppler negative  -echo normal - EF >55%, no acute process noted
resolving  Creatinine 1.26  continue IVF  follow BMP
no events on telemetry--> now off and transferred to non-telemetry floor  -carotid doppler negative  -echo normal - EF >55%, no acute process noted
Hgb 7.1 on admission, now 9.4  s/p 2 units PRBCs  clear liquid diet; NPO after midnight  D5NS @ 75mL/hr  follow up repeat CBC at 4pm
possibly 2/2 to congestion  LFTs normal  cardio following - Dr. Crocker  echo EF >55%  liver sono pending
no events on telemetry--> now off and transferred to non-telemetry floor  -carotid doppler negative  -echo normal - EF >55%, no acute process noted
no events on telemetry--> now off and transferred to non-telemetry floor  -carotid doppler negative  -echo normal - EF >55%, no acute process noted
possibly 2/2 to congestion  LFTs normal  cardio following - Dr. Crocker  echo pending  liver sono pending
no events on telemetry--> now off and transferred to non-telemetry floor  -carotid doppler negative  -echo normal - EF >55%, no acute process noted
no events on telemetry--> now off and transferred to non-telemetry floor  -carotid doppler negative  -echo normal - EF >55%, no acute process noted

## 2018-08-09 NOTE — PROGRESS NOTE ADULT - PROBLEM SELECTOR PROBLEM 6
SALTY (acute kidney injury)
SALTY (acute kidney injury)
Diabetes
Diabetes
SALTY (acute kidney injury)
SALTY (acute kidney injury)
Hypertension
SALTY (acute kidney injury)

## 2018-08-09 NOTE — PROGRESS NOTE ADULT - PROBLEM SELECTOR PLAN 9
restarting home med donepezil 10mg daily
restarting home med donepezil 10mg daily  PT: STEPAN, family giving choices, likely DC tomorrow
restarting home med donepezil 10mg daily  PT: STEPAN, accepted to [facility], pending authorization
restarting home med donepezil 10mg daily
restarting home med donepezil 10mg daily
restarting home med donepezil 10mg daily  PT: STEPAN
restarting home med donepezil 10mg daily  PT: STEPAN, accepted to [facility], pending authorization

## 2018-08-09 NOTE — PROGRESS NOTE ADULT - SUBJECTIVE AND OBJECTIVE BOX
Patient is a 89y old  Female who presents with a chief complaint of syncope and anemia (03 Aug 2018 17:05)      INTERVAL HPI/OVERNIGHT EVENTS:  Patient seen and examined at bedside. Denies chest pain, palpitation, SOB, nausea, vomiting, abdominal pain.    T(C): 36.5 (08-09-18 @ 04:40), Max: 37.1 (08-08-18 @ 14:00)  HR: 84 (08-09-18 @ 04:40) (81 - 90)  BP: 121/67 (08-09-18 @ 04:40) (121/67 - 149/71)  RR: 16 (08-09-18 @ 04:40) (16 - 18)  SpO2: 95% (08-09-18 @ 04:40) (95% - 100%)  Wt(kg): --  I&O's Summary        REVIEW OF SYSTEMS:  No fever,   No cough, SOB  No chest pain, palpitations  No Abd pain, nausea, vomiting, No diarrhea or constipation      PHYSICAL EXAM:    GENERAL: NAD, well-groomed, well-developed  HEAD:  Atraumatic, Normocephalic  EYES: EOMI, PERRLA, conjunctiva and sclera clear  ENMT: No tonsillar erythema, exudates, or enlargement; Moist mucous membranes  NECK: Supple, No JVD, Normal thyroid  NERVOUS SYSTEM:  Alert & Oriented X2	, Motor Strength 5/5 B/L upper and lower extremities; DTRs 2+ intact and symmetric  CHEST/LUNG: Clear to percussion bilaterally; No rales, rhonchi, wheezing, or rubs  HEART: Regular rate and rhythm; No murmurs, rubs, or gallops  ABDOMEN: Soft, Nontender, Nondistended; Bowel sounds present  EXTREMITIES:  2+ Peripheral Pulses, No clubbing, cyanosis, or edema  LYMPH: No lymphadenopathy noted      LABS:                        9.6    8.4   )-----------( 346      ( 09 Aug 2018 06:52 )             31.7     08-09    139  |  106  |  19<H>  ----------------------------<  113<H>  3.8   |  25  |  1.08    Ca    8.6      09 Aug 2018 06:52  Phos  2.3     08-08  Mg     1.9     08-08        CAPILLARY BLOOD GLUCOSE      POCT Blood Glucose.: 115 mg/dL (09 Aug 2018 05:43)  POCT Blood Glucose.: 127 mg/dL (08 Aug 2018 23:48)  POCT Blood Glucose.: 119 mg/dL (08 Aug 2018 16:32)  POCT Blood Glucose.: 110 mg/dL (08 Aug 2018 11:37)          MEDICATIONS  (STANDING):  atorvastatin 40 milliGRAM(s) Oral at bedtime  donepezil 10 milliGRAM(s) Oral daily  heparin  Injectable 5000 Unit(s) SubCutaneous every 12 hours  insulin lispro (HumaLOG) corrective regimen sliding scale   SubCutaneous every 6 hours  lisinopril 40 milliGRAM(s) Oral daily  metoclopramide 10 milliGRAM(s) Oral three times a day  oxybutynin XL 10 milliGRAM(s) Oral daily  pantoprazole    Tablet 40 milliGRAM(s) Oral two times a day    MEDICATIONS  (PRN):  acetaminophen   Tablet. 650 milliGRAM(s) Oral every 6 hours PRN Mild Pain (1 - 3)      Radiology:

## 2018-08-09 NOTE — PROGRESS NOTE ADULT - PROBLEM SELECTOR PROBLEM 1
Pancreatic mass
Abdominal mass, unspecified abdominal location
Syncope
Mass of abdomen
Syncope
Abdominal mass, unspecified abdominal location

## 2018-08-09 NOTE — PROGRESS NOTE ADULT - PROBLEM SELECTOR PROBLEM 2
Anemia
Anemia
Pancreatic mass
Anemia
Hepatomegaly
Pancreatic mass

## 2018-08-09 NOTE — PROGRESS NOTE ADULT - PROBLEM SELECTOR PLAN 10
IMPROVE VTE Individual Risk Assessment          RISK                                                          Points  [  ] Previous VTE                                                3  [  ] Thrombophilia                                             2  [  ] Lower limb paralysis                                   2        (unable to hold up >15 seconds)    [  ] Current Cancer                                             2         (within 6 months)  [ x ] Immobilization > 24 hrs                              1  [  ] ICU/CCU stay > 24 hours                             1  [x  ] Age > 60                                                         1    IMPROVE VTE Score: 2, heparin for dvt ppx as H&H stable
restarting home med interchange - oxybutynin 10mg daily
IMPROVE VTE Individual Risk Assessment          RISK                                                          Points  [  ] Previous VTE                                                3  [  ] Thrombophilia                                             2  [  ] Lower limb paralysis                                   2        (unable to hold up >15 seconds)    [  ] Current Cancer                                             2         (within 6 months)  [ x ] Immobilization > 24 hrs                              1  [  ] ICU/CCU stay > 24 hours                             1  [x  ] Age > 60                                                         1    IMPROVE VTE Score: 2, heparin for dvt ppx as H&H stable
IMPROVE VTE Individual Risk Assessment          RISK                                                          Points  [  ] Previous VTE                                                3  [  ] Thrombophilia                                             2  [  ] Lower limb paralysis                                   2        (unable to hold up >15 seconds)    [  ] Current Cancer                                             2         (within 6 months)  [ x ] Immobilization > 24 hrs                              1  [  ] ICU/CCU stay > 24 hours                             1  [x  ] Age > 60                                                         1    IMPROVE VTE Score: 2, heparin for dvt ppx as H&H stable
restarting home med interchange - oxybutynin 10mg daily
restarting home med interchange - oxybutynin 10mg daily
IMPROVE VTE Individual Risk Assessment          RISK                                                          Points  [  ] Previous VTE                                                3  [  ] Thrombophilia                                             2  [  ] Lower limb paralysis                                   2        (unable to hold up >15 seconds)    [  ] Current Cancer                                             2         (within 6 months)  [ x ] Immobilization > 24 hrs                              1  [  ] ICU/CCU stay > 24 hours                             1  [x  ] Age > 60                                                         1    IMPROVE VTE Score: 2, SCD for dvt ppx

## 2018-08-09 NOTE — PROGRESS NOTE ADULT - PROBLEM SELECTOR PROBLEM 3
Anemia
Syncope
Anemia
SALTY (acute kidney injury)
Syncope
Anemia
Anemia
SALTY (acute kidney injury)
Anemia
Anemia

## 2018-08-09 NOTE — PROGRESS NOTE ADULT - PROVIDER SPECIALTY LIST ADULT
Cardiology
Gastroenterology
Hospitalist
Internal Medicine
Hospitalist
Internal Medicine
Internal Medicine

## 2018-08-09 NOTE — PROGRESS NOTE ADULT - PROBLEM SELECTOR PROBLEM 7
Hypertension
Hypertension
Dementia
Dementia
Hypertension
Diabetes
Hypertension

## 2018-08-09 NOTE — PROGRESS NOTE ADULT - PROBLEM SELECTOR PLAN 8
humalog sliding scale
Humalog sliding scale
restarting home med interchange - oxybutynin 10mg daily
humalog sliding scale
humalog sliding scale
restarting home med interchange - oxybutynin 10mg daily
humalog sliding scale

## 2018-08-09 NOTE — PROGRESS NOTE ADULT - PROBLEM SELECTOR PROBLEM 8
Diabetes
Diabetes
Overactive bladder
Diabetes
Diabetes
Overactive bladder
Diabetes

## 2018-08-09 NOTE — PROGRESS NOTE ADULT - ATTENDING COMMENTS
DVT: add scds
Patient was examined this AM and discussed with Dr. Carlisle.    Plan, transfusion, EGD, echo, cardiology, as above.
Patient was examined at the bedside.        She remains alert and cooperative  Vital Signs Last 24 Hrs  T(C): 36.8 (03 Aug 2018 16:00), Max: 36.8 (03 Aug 2018 16:00)  T(F): 98.3 (03 Aug 2018 16:00), Max: 98.3 (03 Aug 2018 16:00)  HR: 77 (03 Aug 2018 16:00) (77 - 102)  BP: 124/57 (03 Aug 2018 16:00) (94/70 - 145/69)  BP(mean): --  RR: 18 (03 Aug 2018 16:00) (17 - 18)  SpO2: 100% (03 Aug 2018 16:00) (96% - 100%)  Lungs, clear  Cor, RRR  Abdomen, soft, hepatomegaly.                           10.4   11.8  )-----------( 339      ( 03 Aug 2018 06:30 )             33.4   08-03    140  |  107  |  12  ----------------------------<  136<H>  3.6   |  26  |  1.26    Ca    8.2<L>      03 Aug 2018 06:30      Findings of the abdominal CT were reviewed with radiology.  These and EGD were discussed with the patient and her grandson.     < from: CT Abdomen w/ IV Cont (08.02.18 @ 20:26) >    Gastric outlet obstruction with a thickened gastric antrum and pylorus.   Primary neoplasm suspected. Centrallynecrotic local celiac axis node   accounting for recent sonographic findings and presumed metastatic.   Endoscopy recommended.    < end of copied text >    EGD showed a bezoar, no mucosal abnormalities.    IMP:  Abdominal mass is likely a lymph node which is superior to the pancreas.  No mass is seen in the body of the pancreas.  Radiology assessment is that this is a metastatic node draining another primary, not pancreas.   IR states they would NOT be able to access this lesion for a biopsy.   Bezoar of food is the mass within the stomach.   No current GI bleeding.  H/H are stable.   DM, moderately controlled.     Plan: Full liquid diet.            Erythromycin to stimulate gastric motility          UGI and small bowel follow through on Monday evening.           NPO Sunday night to Monday.          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
Patient was examined at the bedside. Plan of care was discussed with Dr. Carlisle and with GI.     Vital Signs Last 24 Hrs  T(C): 37.3 (01 Aug 2018 15:38), Max: 37.3 (01 Aug 2018 15:38)  T(F): 99.1 (01 Aug 2018 15:38), Max: 99.1 (01 Aug 2018 15:38)  HR: 80 (01 Aug 2018 15:38) (73 - 106)  BP: 143/62 (01 Aug 2018 15:38) (129/67 - 143/73)  BP(mean): --  RR: 17 (01 Aug 2018 15:38) (16 - 18)  SpO2: 100% (01 Aug 2018 15:38) (97% - 100%)                        8.4    9.8   )-----------( 284      ( 01 Aug 2018 16:33 )             26.5     IMP:  UGI bleed.           Syncope  Plan:  Will repeat EGD to look for source.  f/u vs and H/H.           Cardiology consultation.
Attending Medicine Covering Dr. Crump    Patient was examined and discussed with Dr. Castillo PGY3 and MS III    patient admitted with melena and Syncope s/p  EGD x 2 showed gastric bezoar, no source of bleeding.  UGI showed good passage of dye. on PPI    Patient is stable, alert, and cooperative.  Her functional status is less than before, and she would benefit from STEPAN.    Dr. Crump did Peer to Peer and patient accepted to [facility]    Discussed with PGY3, RN and 
DVT: add scds
Patient was examined and discussed with Dr. Lou.   Grandson has asked us to reiterate that w/u for primary source of celiac node, found incidentally on abdominal CT, was too risky to be worthwhile.   The patient originally came with syncope and melena. Cardiac w/u was negative.  Patient was given PPI's.  EGD x 2 showed gastric bezoar, no source of bleeding.  UGI showed good passage of dye.   Patient is stable, alert, and cooperative.  Her functional status is less than before, and she would benefit from STEPAN.
Patient was examined at the bedside and discussed with Dr. Lou.    She was admitted for syncope and melena.  EGD showed a gastric bezoar.  CT showed a large celiac node.   After discussion with GI, patient, and grandson, decision was made not to pursue further w/u of the origin of the celiac node.   She is no longer having melena, is stable, and ready for discharge to [facility] ([place]).
Patient was examined at the bedside. Plan of care was discussed with Dr. Carlisle and with GI.          IMP:  Hepatomegaly, mass seen on ultrasound.            UGI bleed, inactive.             Syncope, cardiology consultation, appreciated  Plan:  Will repeat EGD to look for source.  f/u vs and H/H.            Discontinue telemetry           CT abdomen with MRCP protocol
Patient was examined at the bedside and discussed with Dr. Lou.     She has tolerated food in the last few days.     She is alert and oriented to person.   Vital Signs Last 24 Hrs  T(C): 36.6 (06 Aug 2018 04:30), Max: 36.8 (05 Aug 2018 20:16)  T(F): 97.8 (06 Aug 2018 04:30), Max: 98.2 (05 Aug 2018 20:16)  HR: 94 (06 Aug 2018 04:30) (73 - 94)  BP: 169/82 (06 Aug 2018 04:30) (150/61 - 169/82)  BP(mean): --  RR: 17 (06 Aug 2018 04:30) (17 - 18)  SpO2: 99% (06 Aug 2018 04:30) (99% - 100%)  Lungs, clear  Cor, RRR  Abdomen, soft  Neurological, non-focal                        9.8    8.9   )-----------( 321      ( 06 Aug 2018 07:25 )             32.4   08-06    141  |  109<H>  |  8   ----------------------------<  95  4.2   |  25  |  1.24    Ca    8.0<L>      06 Aug 2018 07:25    She has been called to radiology department for UGI with small bowel follow through.     IMP:  Anemia, melena, source of bleeding not found.            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  Discussed with family (Grandson, Power of ) and patient.  Further w/u for source is likely to be too invasive in this 89 yo patient.           Gastric bezoar, c/w food.  r/o obstruction.   Plan: UGI and small bowel follow through.  If dye passes without obstruction, will discharge to [facility].

## 2018-08-09 NOTE — PROGRESS NOTE ADULT - PROBLEM SELECTOR PLAN 6
resolved
resolved
humalog sliding scale
Creatinine trending down to 1.47 today  continue IVF  follow BMP
humalog sliding scale
improving slowly  continue IVF  follow BMP
resolved
resolved  continue IVF
holding home meds as BP is 118/48  continue monitoring vitals
improving slowly  continue IVF  follow BMP

## 2018-08-09 NOTE — PROGRESS NOTE ADULT - PROBLEM SELECTOR PLAN 1
had bezoar of food w/in stomach  no tenderness or distention on abdominal exam  no dysmotility on upper GI series, EGD on 8/3 showing food bolus in stomach and no evident of mass  c/w reglan  GI consult- Dr Garzon, outpatient EUS

## 2018-08-09 NOTE — PROGRESS NOTE ADULT - PROBLEM SELECTOR PROBLEM 10
Overactive bladder
Need for prophylactic measure
Need for prophylactic measure
Overactive bladder
Overactive bladder
Need for prophylactic measure
Need for prophylactic measure

## 2018-08-09 NOTE — PROGRESS NOTE ADULT - PROBLEM SELECTOR PROBLEM 4
Syncope
SALTY (acute kidney injury)
Syncope
Anemia
Hepatomegaly
Hepatomegaly
Syncope

## 2018-08-09 NOTE — PROGRESS NOTE ADULT - PROBLEM SELECTOR PLAN 2
U/S liver and pancreas shows possible mass which is suspected to be lymph node  CT abdomen shows gastric outlet obstruction and suspected neoplasm with lymph node involvement  family and patient aware of findings, PGY2 had discussion regarding treatments and possible interventions  outpatient EUS

## 2018-08-09 NOTE — CHART NOTE - NSCHARTNOTEFT_GEN_A_CORE
Assessment: Pt is 89 yo F admitted with syncope and collapse, presents with upper GI bleed and gastroparesis.    Factors impacting intake: [ ] none [ ] nausea  [ ] vomiting [ ] diarrhea [ ] constipation  [ ]chewing problems [  ] swallowing issues  [ x ] other: GI bleed    Diet Presciption: Diet, Dysphagia 2 Mechanical Soft-Honey Consistency Fluid (18 @ 11:19)    Intake: Visited pt, declined RD visit. Per RN, pt eating <50% meals.     Daily Weight in k (07 Aug 2018 05:44)  Weight in k (05 Aug 2018 05:49)  Weight in k.9 (04 Aug 2018 05:00)  Weight in k.3 (03 Aug 2018 08:16)    % Weight Change    Pertinent Medications: MEDICATIONS  (STANDING):  atorvastatin 40 milliGRAM(s) Oral at bedtime  donepezil 10 milliGRAM(s) Oral at bedtime  heparin  Injectable 5000 Unit(s) SubCutaneous every 12 hours  insulin lispro (HumaLOG) corrective regimen sliding scale   SubCutaneous every 6 hours  lisinopril 40 milliGRAM(s) Oral daily  metoclopramide 10 milliGRAM(s) Oral three times a day  oxybutynin XL 10 milliGRAM(s) Oral daily  pantoprazole    Tablet 40 milliGRAM(s) Oral two times a day    MEDICATIONS  (PRN):  acetaminophen   Tablet. 650 milliGRAM(s) Oral every 6 hours PRN Mild Pain (1 - 3)    Pertinent Labs:  Na139 mmol/L Glu 113 mg/dL<H> K+ 3.8 mmol/L Cr  1.08 mg/dL BUN 19 mg/dL<H>  Phos 2.3 mg/dL<L>  Alb 2.3 g/dL<L>  [id number] 5.7 %<H>  Chol 85 mg/dL LDL 27 mg/dL HDL 46 mg/dL Trig 61 mg/dL     CAPILLARY BLOOD GLUCOSE      POCT Blood Glucose.: 128 mg/dL (09 Aug 2018 11:36)  POCT Blood Glucose.: 115 mg/dL (09 Aug 2018 05:43)  POCT Blood Glucose.: 127 mg/dL (08 Aug 2018 23:48)  POCT Blood Glucose.: 119 mg/dL (08 Aug 2018 16:32)      Skin: intact    Estimated Needs:   [ x ] no change since previous assessment  [ ] recalculated:     Previous Nutrition Diagnosis:   [ ] Inadequate Energy Intake [ x ]Inadequate Oral Intake [ ] Excessive Energy Intake   [ ] Underweight [ ] Increased Nutrient Needs [ ] Overweight/Obesity  [ ] Swallowing Difficult   [ ] Altered GI Function [ ] Unintended Weight Loss [ ] Food & Nutrition Related Knowledge Deficit [ ] Malnutrition   [ ] Not Ready for Diet/Life Style Changes     Nutrition Diagnosis is [x  ] ongoing  [ ] Improving   [ ] resolved [ ] not applicable     New Nutrition Diagnosis: [ ] not applicable       Interventions:   Recommend  [ ] Change Diet To:  [ x ] Nutrition Supplement- 2 Lazaro HN BID  [ ] Nutrition Support  [ ] Other:     Monitoring and Evaluation:   [ X ] PO intake [ x ] Tolerance to diet prescription [ x ] weights [ x ] labs[ x ] follow up per protocol  [ ] other: Assessment: Pt is 89 yo F admitted with syncope and collapse, presents with upper GI bleed and gastroparesis.    Factors impacting intake: [ ] none [ ] nausea  [ ] vomiting [ ] diarrhea [ ] constipation  [ ]chewing problems [  ] swallowing issues  [ x ] other: GI bleed    Diet Presciption: Diet, Dysphagia 2 Mechanical Soft-Honey Consistency Fluid (18 @ 11:19)    Intake: Visited pt, declined RD visit. Per RN, pt eating <50% meals.     Daily Weight in k (07 Aug 2018 05:44)  Weight in k (05 Aug 2018 05:49)  Weight in k.9 (04 Aug 2018 05:00)  Weight in k.3 (03 Aug 2018 08:16)    % Weight Change    Pertinent Medications: MEDICATIONS  (STANDING):  atorvastatin 40 milliGRAM(s) Oral at bedtime  donepezil 10 milliGRAM(s) Oral at bedtime  heparin  Injectable 5000 Unit(s) SubCutaneous every 12 hours  insulin lispro (HumaLOG) corrective regimen sliding scale   SubCutaneous every 6 hours  lisinopril 40 milliGRAM(s) Oral daily  metoclopramide 10 milliGRAM(s) Oral three times a day  oxybutynin XL 10 milliGRAM(s) Oral daily  pantoprazole    Tablet 40 milliGRAM(s) Oral two times a day    MEDICATIONS  (PRN):  acetaminophen   Tablet. 650 milliGRAM(s) Oral every 6 hours PRN Mild Pain (1 - 3)    Pertinent Labs:  Na139 mmol/L Glu 113 mg/dL<H> K+ 3.8 mmol/L Cr  1.08 mg/dL BUN 19 mg/dL<H>  Phos 2.3 mg/dL<L>  Alb 2.3 g/dL<L>  LicainzjmbP2D 5.7 %<H>  Chol 85 mg/dL LDL 27 mg/dL HDL 46 mg/dL Trig 61 mg/dL     CAPILLARY BLOOD GLUCOSE      POCT Blood Glucose.: 128 mg/dL (09 Aug 2018 11:36)  POCT Blood Glucose.: 115 mg/dL (09 Aug 2018 05:43)  POCT Blood Glucose.: 127 mg/dL (08 Aug 2018 23:48)  POCT Blood Glucose.: 119 mg/dL (08 Aug 2018 16:32)      Skin: intact    Estimated Needs:   [ x ] no change since previous assessment  [ ] recalculated:     Previous Nutrition Diagnosis:   [ ] Inadequate Energy Intake [ x ]Inadequate Oral Intake [ ] Excessive Energy Intake   [ ] Underweight [ ] Increased Nutrient Needs [ ] Overweight/Obesity  [ ] Swallowing Difficult   [ ] Altered GI Function [ ] Unintended Weight Loss [ ] Food & Nutrition Related Knowledge Deficit [ ] Malnutrition   [ ] Not Ready for Diet/Life Style Changes     Nutrition Diagnosis is [x  ] ongoing  [ ] Improving   [ ] resolved [ ] not applicable     New Nutrition Diagnosis: [ ] not applicable       Interventions:   Recommend  [ ] Change Diet To:  [ x ] Nutrition Supplement- 2 Lazaro HN BID w/ thicken up as feasible  [ ] Nutrition Support  [ ] Other:     Monitoring and Evaluation:   [ X ] PO intake [ x ] Tolerance to diet prescription [ x ] weights [ x ] labs[ x ] follow up per protocol  [ ] other:

## 2018-08-09 NOTE — PROGRESS NOTE ADULT - PROBLEM SELECTOR PLAN 5
c/w reglan  no dysmotility on upper GI series  Dr. Garzon - GI  s/p egd 8/3 showing gastric bezoar, patent pyloris
c/w reglan  no dysmotility on upper GI series  Dr. Garzon - GI  s/p egd 8/3 showing gastric bezoar, patent pyloris
holding home meds  continue monitoring vitals
Resolving.  Repeat EGD in AM to look for source.
erythromycin and reglan as above  -plan for UGI w/ small bowel series follow through on Monday  -NPO after MN on Sunday
holding home meds  continue monitoring vitals
K+ 6.1 on admission - sample slightly hemolyzed  now 4.1  follow up repeat BMP
c/w reglan  f/u UGI w/ small bowel series  Dr. Garzon - GI  s/p egd 8/3 showing gastric bezoar, patent pyloris
c/w reglan  no dysmotility on upper GI series  Dr. Garzon - GI  s/p egd 8/3 showing gastric bezoar, patent pyloris
erythromycin and reglan as above  -plan for UGI w/ small bowel series follow through on Monday  -NPO after MN on Sunday

## 2018-08-09 NOTE — PROGRESS NOTE ADULT - PROBLEM SELECTOR PLAN 7
will resume quinapril 40mg  continue monitoring vitals
will resume quinapril 40mg  continue monitoring vitals
restarting home med donepezil 10mg daily
holding home meds  continue monitoring vitals
holding home meds  continue monitoring vitals
restarting home med donepezil 10mg daily
will resume quinapril 40mg  continue monitoring vitals
will resume quinapril 40mg  continue monitoring vitals
humalog sliding scale
holding home meds  continue monitoring vitals

## 2018-08-13 PROBLEM — E11.9 TYPE 2 DIABETES MELLITUS WITHOUT COMPLICATIONS: Chronic | Status: ACTIVE | Noted: 2018-07-30

## 2018-08-13 PROBLEM — I10 ESSENTIAL (PRIMARY) HYPERTENSION: Chronic | Status: ACTIVE | Noted: 2018-07-30

## 2018-08-13 PROBLEM — E78.3 HYPERCHYLOMICRONEMIA: Chronic | Status: ACTIVE | Noted: 2018-07-30

## 2018-08-23 ENCOUNTER — INPATIENT (INPATIENT)
Facility: HOSPITAL | Age: 83
LOS: 13 days | Discharge: NURSING, REHAB OR LONG-TERM CARE | DRG: 377 | End: 2018-09-06
Attending: INTERNAL MEDICINE | Admitting: INTERNAL MEDICINE
Payer: COMMERCIAL

## 2018-08-23 VITALS
DIASTOLIC BLOOD PRESSURE: 65 MMHG | HEIGHT: 62 IN | HEART RATE: 119 BPM | WEIGHT: 154.98 LBS | OXYGEN SATURATION: 100 % | RESPIRATION RATE: 16 BRPM | SYSTOLIC BLOOD PRESSURE: 93 MMHG | TEMPERATURE: 99 F

## 2018-08-23 DIAGNOSIS — I95.9 HYPOTENSION, UNSPECIFIED: ICD-10-CM

## 2018-08-23 DIAGNOSIS — Z71.89 OTHER SPECIFIED COUNSELING: ICD-10-CM

## 2018-08-23 DIAGNOSIS — I10 ESSENTIAL (PRIMARY) HYPERTENSION: ICD-10-CM

## 2018-08-23 DIAGNOSIS — Z29.9 ENCOUNTER FOR PROPHYLACTIC MEASURES, UNSPECIFIED: ICD-10-CM

## 2018-08-23 DIAGNOSIS — E11.9 TYPE 2 DIABETES MELLITUS WITHOUT COMPLICATIONS: ICD-10-CM

## 2018-08-23 DIAGNOSIS — K92.2 GASTROINTESTINAL HEMORRHAGE, UNSPECIFIED: ICD-10-CM

## 2018-08-23 DIAGNOSIS — F03.90 UNSPECIFIED DEMENTIA WITHOUT BEHAVIORAL DISTURBANCE: ICD-10-CM

## 2018-08-23 LAB
ACANTHOCYTES BLD QL SMEAR: SLIGHT — SIGNIFICANT CHANGE UP
ACETONE SERPL-MCNC: NEGATIVE — SIGNIFICANT CHANGE UP
ALBUMIN SERPL ELPH-MCNC: 2.6 G/DL — LOW (ref 3.5–5)
ALP SERPL-CCNC: 66 U/L — SIGNIFICANT CHANGE UP (ref 40–120)
ALT FLD-CCNC: 24 U/L DA — SIGNIFICANT CHANGE UP (ref 10–60)
ANION GAP SERPL CALC-SCNC: 10 MMOL/L — SIGNIFICANT CHANGE UP (ref 5–17)
ANISOCYTOSIS BLD QL: SIGNIFICANT CHANGE UP
APPEARANCE UR: CLEAR — SIGNIFICANT CHANGE UP
APTT BLD: 26.7 SEC — LOW (ref 27.5–37.4)
AST SERPL-CCNC: 18 U/L — SIGNIFICANT CHANGE UP (ref 10–40)
BACTERIA # UR AUTO: ABNORMAL /HPF
BILIRUB SERPL-MCNC: 0.1 MG/DL — LOW (ref 0.2–1.2)
BILIRUB UR-MCNC: NEGATIVE — SIGNIFICANT CHANGE UP
BLD GP AB SCN SERPL QL: SIGNIFICANT CHANGE UP
BUN SERPL-MCNC: 70 MG/DL — HIGH (ref 7–18)
BURR CELLS BLD QL SMEAR: PRESENT — SIGNIFICANT CHANGE UP
CALCIUM SERPL-MCNC: 9.4 MG/DL — SIGNIFICANT CHANGE UP (ref 8.4–10.5)
CHLORIDE SERPL-SCNC: 105 MMOL/L — SIGNIFICANT CHANGE UP (ref 96–108)
CO2 SERPL-SCNC: 23 MMOL/L — SIGNIFICANT CHANGE UP (ref 22–31)
COLOR SPEC: YELLOW — SIGNIFICANT CHANGE UP
CREAT SERPL-MCNC: 1.8 MG/DL — HIGH (ref 0.5–1.3)
DIFF PNL FLD: NEGATIVE — SIGNIFICANT CHANGE UP
EPI CELLS # UR: SIGNIFICANT CHANGE UP /HPF
GLUCOSE SERPL-MCNC: 162 MG/DL — HIGH (ref 70–99)
GLUCOSE UR QL: NEGATIVE — SIGNIFICANT CHANGE UP
HCT VFR BLD CALC: 16.6 % — CRITICAL LOW (ref 34.5–45)
HGB BLD-MCNC: 5.1 G/DL — CRITICAL LOW (ref 11.5–15.5)
HYPERCHROMIA BLD QL AUTO: SLIGHT — SIGNIFICANT CHANGE UP
HYPOCHROMIA BLD QL: SIGNIFICANT CHANGE UP
INR BLD: 1.21 RATIO — HIGH (ref 0.88–1.16)
KETONES UR-MCNC: NEGATIVE — SIGNIFICANT CHANGE UP
LEUKOCYTE ESTERASE UR-ACNC: ABNORMAL
LYMPHOCYTES # BLD AUTO: 9 % — LOW (ref 13–44)
MACROCYTES BLD QL: SLIGHT — SIGNIFICANT CHANGE UP
MAGNESIUM SERPL-MCNC: 1.7 MG/DL — SIGNIFICANT CHANGE UP (ref 1.6–2.6)
MANUAL DIF COMMENT BLD-IMP: SIGNIFICANT CHANGE UP
MCHC RBC-ENTMCNC: 25.5 PG — LOW (ref 27–34)
MCHC RBC-ENTMCNC: 30.4 GM/DL — LOW (ref 32–36)
MCV RBC AUTO: 83.9 FL — SIGNIFICANT CHANGE UP (ref 80–100)
MICROCYTES BLD QL: SLIGHT — SIGNIFICANT CHANGE UP
MONOCYTES NFR BLD AUTO: 12 % — SIGNIFICANT CHANGE UP (ref 2–14)
NEUTROPHILS NFR BLD AUTO: 79 % — HIGH (ref 43–77)
NITRITE UR-MCNC: NEGATIVE — SIGNIFICANT CHANGE UP
NT-PROBNP SERPL-SCNC: 540 PG/ML — HIGH (ref 0–450)
OB PNL STL: POSITIVE
OVALOCYTES BLD QL SMEAR: SLIGHT — SIGNIFICANT CHANGE UP
PH UR: 5 — SIGNIFICANT CHANGE UP (ref 5–8)
PLAT MORPH BLD: NORMAL — SIGNIFICANT CHANGE UP
PLATELET # BLD AUTO: 306 K/UL — SIGNIFICANT CHANGE UP (ref 150–400)
POIKILOCYTOSIS BLD QL AUTO: SLIGHT — SIGNIFICANT CHANGE UP
POLYCHROMASIA BLD QL SMEAR: SLIGHT — SIGNIFICANT CHANGE UP
POTASSIUM SERPL-MCNC: 5.2 MMOL/L — SIGNIFICANT CHANGE UP (ref 3.5–5.3)
POTASSIUM SERPL-SCNC: 5.2 MMOL/L — SIGNIFICANT CHANGE UP (ref 3.5–5.3)
PROT SERPL-MCNC: 6.8 G/DL — SIGNIFICANT CHANGE UP (ref 6–8.3)
PROT UR-MCNC: 15
PROTHROM AB SERPL-ACNC: 13.2 SEC — HIGH (ref 9.8–12.7)
RBC # BLD: 1.98 M/UL — LOW (ref 3.8–5.2)
RBC # FLD: 20.1 % — HIGH (ref 10.3–14.5)
RBC BLD AUTO: ABNORMAL
RBC CASTS # UR COMP ASSIST: SIGNIFICANT CHANGE UP /HPF (ref 0–2)
SMUDGE CELLS # BLD: PRESENT — SIGNIFICANT CHANGE UP
SODIUM SERPL-SCNC: 138 MMOL/L — SIGNIFICANT CHANGE UP (ref 135–145)
SP GR SPEC: 1.01 — SIGNIFICANT CHANGE UP (ref 1.01–1.02)
UROBILINOGEN FLD QL: NEGATIVE — SIGNIFICANT CHANGE UP
WBC # BLD: 16 K/UL — HIGH (ref 3.8–10.5)
WBC # FLD AUTO: 16 K/UL — HIGH (ref 3.8–10.5)
WBC UR QL: SIGNIFICANT CHANGE UP /HPF (ref 0–5)

## 2018-08-23 PROCEDURE — 71045 X-RAY EXAM CHEST 1 VIEW: CPT | Mod: 26

## 2018-08-23 PROCEDURE — 99291 CRITICAL CARE FIRST HOUR: CPT

## 2018-08-23 RX ORDER — FUROSEMIDE 40 MG
40 TABLET ORAL ONCE
Qty: 0 | Refills: 0 | Status: DISCONTINUED | OUTPATIENT
Start: 2018-08-23 | End: 2018-08-23

## 2018-08-23 RX ORDER — PANTOPRAZOLE SODIUM 20 MG/1
8 TABLET, DELAYED RELEASE ORAL
Qty: 80 | Refills: 0 | Status: DISCONTINUED | OUTPATIENT
Start: 2018-08-23 | End: 2018-08-27

## 2018-08-23 RX ORDER — DEXTROSE 50 % IN WATER 50 %
15 SYRINGE (ML) INTRAVENOUS ONCE
Qty: 0 | Refills: 0 | Status: DISCONTINUED | OUTPATIENT
Start: 2018-08-23 | End: 2018-08-26

## 2018-08-23 RX ORDER — SODIUM CHLORIDE 9 MG/ML
1000 INJECTION, SOLUTION INTRAVENOUS
Qty: 0 | Refills: 0 | Status: DISCONTINUED | OUTPATIENT
Start: 2018-08-23 | End: 2018-08-26

## 2018-08-23 RX ORDER — DEXTROSE 50 % IN WATER 50 %
25 SYRINGE (ML) INTRAVENOUS ONCE
Qty: 0 | Refills: 0 | Status: DISCONTINUED | OUTPATIENT
Start: 2018-08-23 | End: 2018-08-26

## 2018-08-23 RX ORDER — GLUCAGON INJECTION, SOLUTION 0.5 MG/.1ML
1 INJECTION, SOLUTION SUBCUTANEOUS ONCE
Qty: 0 | Refills: 0 | Status: DISCONTINUED | OUTPATIENT
Start: 2018-08-23 | End: 2018-09-06

## 2018-08-23 RX ORDER — SODIUM CHLORIDE 9 MG/ML
1000 INJECTION, SOLUTION INTRAVENOUS
Qty: 0 | Refills: 0 | Status: DISCONTINUED | OUTPATIENT
Start: 2018-08-23 | End: 2018-08-27

## 2018-08-23 RX ORDER — DEXTROSE 50 % IN WATER 50 %
12.5 SYRINGE (ML) INTRAVENOUS ONCE
Qty: 0 | Refills: 0 | Status: DISCONTINUED | OUTPATIENT
Start: 2018-08-23 | End: 2018-08-26

## 2018-08-23 RX ORDER — INSULIN LISPRO 100/ML
VIAL (ML) SUBCUTANEOUS EVERY 6 HOURS
Qty: 0 | Refills: 0 | Status: DISCONTINUED | OUTPATIENT
Start: 2018-08-23 | End: 2018-08-24

## 2018-08-23 RX ORDER — SODIUM CHLORIDE 9 MG/ML
1000 INJECTION INTRAMUSCULAR; INTRAVENOUS; SUBCUTANEOUS
Qty: 0 | Refills: 0 | Status: DISCONTINUED | OUTPATIENT
Start: 2018-08-23 | End: 2018-08-23

## 2018-08-23 RX ORDER — PANTOPRAZOLE SODIUM 20 MG/1
80 TABLET, DELAYED RELEASE ORAL ONCE
Qty: 0 | Refills: 0 | Status: COMPLETED | OUTPATIENT
Start: 2018-08-23 | End: 2018-08-23

## 2018-08-23 RX ORDER — DONEPEZIL HYDROCHLORIDE 10 MG/1
10 TABLET, FILM COATED ORAL AT BEDTIME
Qty: 0 | Refills: 0 | Status: DISCONTINUED | OUTPATIENT
Start: 2018-08-23 | End: 2018-09-06

## 2018-08-23 RX ORDER — SODIUM CHLORIDE 9 MG/ML
1000 INJECTION INTRAMUSCULAR; INTRAVENOUS; SUBCUTANEOUS ONCE
Qty: 0 | Refills: 0 | Status: COMPLETED | OUTPATIENT
Start: 2018-08-23 | End: 2018-08-23

## 2018-08-23 RX ADMIN — PANTOPRAZOLE SODIUM 80 MILLIGRAM(S): 20 TABLET, DELAYED RELEASE ORAL at 16:22

## 2018-08-23 RX ADMIN — SODIUM CHLORIDE 125 MILLILITER(S): 9 INJECTION INTRAMUSCULAR; INTRAVENOUS; SUBCUTANEOUS at 15:04

## 2018-08-23 RX ADMIN — SODIUM CHLORIDE 1000 MILLILITER(S): 9 INJECTION INTRAMUSCULAR; INTRAVENOUS; SUBCUTANEOUS at 15:03

## 2018-08-23 NOTE — H&P ADULT - PROBLEM SELECTOR PLAN 6
IMPROVE VTE Individual Risk Assessment          RISK                                                          Points  [  ] Previous VTE                                                3  [  ] Thrombophilia                                             2  [  ] Lower limb paralysis                                   2        (unable to hold up >15 seconds)    [  ] Current Cancer                                             2         (within 6 months)  [ x ] Immobilization > 24 hrs                              1  [  ] ICU/CCU stay > 24 hours                             1  [ x ] Age > 60                                                         1    IMPROVE VTE Score: 2  Will hold off from HSQ in setting of severe anemia/GI bleed  c/w SCD's for now

## 2018-08-23 NOTE — ED PROVIDER NOTE - OBJECTIVE STATEMENT
89 y.o. [place] female BIBA with reportedly Hgb 5.1, as per grandson, pt was recently hospitalized for blood transfusion, also found pancreatic mass, had EGD.  appetite good, pt walks with assistance, pt also c/o RUQ pain, no vomiting

## 2018-08-23 NOTE — H&P ADULT - PROBLEM SELECTOR PLAN 1
Patient p/w Hb: 5.1, FOBT positive w/ orthostatics positive, Hx iron deficiency anemia with baseline Hb: 9-10. Recently discharged on August 9th with Hb: 9.7  Patient not having active bowel movements at the moment, not vomiting, asymptomatic. Very demented, unable to give further details.  EGD August 2018: no signs of active GI bleed  Will transfuse 2U PRBC --> check CBC post transfusion  Keep Hb >8, follow CBC q6hrs for now  Will keep NPO for now, c/w IVF  c/w Protonix 40 mg IV BID  Monitor vitals closely, will consult ICU   Patient remains full code, as per proxy wishes Patient p/w Hb: 5.1, FOBT positive w/ black hard stools. Hx iron deficiency anemia with baseline Hb: 9-10. Recently discharged on August 9th with Hb: 9.7  Patient not having active bowel movements at the moment, not vomiting, asymptomatic. Very demented, unable to give further details.  Orthostatics negative  EGD August 2018: no signs of active GI bleed  Will transfuse 2U PRBC --> check CBC post transfusion  Keep Hb >8, follow CBC q6hrs for now  Will keep NPO for now, c/w IVF  c/w Protonix 40 mg IV BID  Monitor vitals closely, will consult ICU   Patient remains full code, as per proxy wishes  Dr Garzon Patient p/w Hb: 5.1, FOBT positive w/ black hard stools. Hx iron deficiency anemia with baseline Hb: 9-10. Recently discharged on August 9th with Hb: 9.7  Patient not having active bowel movements at the moment, not vomiting, asymptomatic. Very demented, unable to give further details.  Orthostatics negative  EGD August 2018: no signs of active GI bleed  Will transfuse 2U PRBC --> check CBC post transfusion  Keep Hb >8, follow CBC q6hrs for now  Will keep NPO for now, c/w IVF  c/w Protonix gtt  Monitor vitals closely, will consult ICU   Patient remains full code, as per proxy wishes  Dr Garzon Patient p/w Hb: 5.1, FOBT positive w/ black hard stools. Hx iron deficiency anemia with baseline Hb: 9-10. Recently discharged on August 9th with Hb: 9.7  Patient not having active bowel movements at the moment, not vomiting, asymptomatic. Very demented, unable to give further details.  Orthostatics negative  EGD August 2018: no signs of active GI bleed  Will transfuse 2U PRBC --> check CBC post transfusion  Keep Hb >8, follow CBC q6hrs for now  Will keep NPO for now, c/w IVF  c/w Protonix gtt  Patient hypotensive: 79/33 mmHg, HR: 135 bpm at bedside  Monitor vitals closely, will consult ICU   Patient remains full code, as per proxy wishes  Dr Garzon Patient p/w Hb: 5.1, FOBT positive w/ black hard stools. Hx iron deficiency anemia with baseline Hb: 9-10. Recently discharged on August 9th with Hb: 9.7  Patient not having active bowel movements at the moment, not vomiting, asymptomatic. Very demented, unable to give further details.  Orthostatics negative  EGD August 2018: no signs of active GI bleed  Will transfuse 2U PRBC --> check CBC post transfusion  Keep Hb >8, follow CBC q6hrs for now  Will keep NPO for now, c/w IVF  c/w Protonix gtt  Patient hypotensive: 79/33 mmHg, HR: 135 bpm at bedside  Monitor vitals closely, will consult ICU   Patient remains full code, as per proxy wishes  Unable to obtain a second IV access as grandson does not want staff to keep on picking patient. Explained the need for two lines for emergency anticipation, still refusing.  Dr Garzon

## 2018-08-23 NOTE — ED PROVIDER NOTE - MEDICAL DECISION MAKING DETAILS
low H/H, hypotensive, black stool, concern for GIB, will get labs, give IVF, blood transfusion as needed

## 2018-08-23 NOTE — H&P ADULT - PROBLEM SELECTOR PLAN 5
[facility] discussed at length with Grandson (proxy): Yousuf, he wants everything to be done, regardless of the consequences of CPR/intubation, "until she tells me to stop". Patient remains FULL CODE.  Will consult palliative care, in setting of advanced dementia, pancreatic mass and GI bleed, grandson with unrealistic expectations.

## 2018-08-23 NOTE — ED PROVIDER NOTE - PMH
Dementia    Hyperchylomicronemia    Hypertension, unspecified type    Type 2 diabetes mellitus without complication, without long-term current use of insulin

## 2018-08-23 NOTE — H&P ADULT - FAMILY HISTORY
Family history of hyperlipidemia     Father  Still living? No  Family history of diabetes mellitus, Age at diagnosis: Age Unknown     Mother  Still living? Unknown  Family history of diabetes mellitus, Age at diagnosis: Age Unknown

## 2018-08-23 NOTE — H&P ADULT - HISTORY OF PRESENT ILLNESS
89yo female, from NH, AAOx1, with PMHx Dementia, HTN, DM, HLD, Iron deficiency anemia, Urinary Incontinence, sent from NH for Hb: 5. Patient is very demented and unable to give further details. Rectal exam reveals dark hard stools, of note, patient is on iron supplements. Grandson at bedside, refers that patient has been having dark bowel movements for a couple of months back, before being admitted to NH for rehab. Patient was recently admitted on July 30th for syncopal episode, found to be anemic (Hb: 7.1), FOBT (+), underwent EGD which was negative for active bleeding, found to have bazoar in stomach. 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 Rectal exam reveals dark hard stools, of note, patient is on iron supplements. Grandson at bedside, refers that patient has been having dark bowel movements for a couple of months back, before being admitted to NH for rehab. GOC discussed at length with grandson: Yousuf. Patient remains FULL CODE. He wants everything to be done for the patient, "until she tells me to stop" 89yo female, from NH, AAOx1, with PMHx Dementia, HTN, DM, HLD, Iron deficiency anemia, Urinary Incontinence, sent from NH for Hb: 5. Patient is very demented and unable to give further details. Resting comfortably in bed, no abdominal pain, N/V/ hematochezia or hematemesis. Grandson at bedside, refers that patient has been having dark bowel movements for a couple of months back, before being admitted to NH for rehab. Patient was recently admitted on July 30th for syncopal episode, found to be anemic (Hb: 7.1), FOBT (+), underwent EGD which was negative for active bleeding, found to have bazoar in stomach. 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 Grandson at bedside, refers that patient has been having dark bowel movements for a couple of months back, before being admitted to NH for rehab. GOC discussed at length with grandson: Yousuf. Patient remains FULL CODE. He wants everything to be done for the patient, "until she tells me to stop". 89yo female, from NH, AAOx1, with PMHx Dementia, HTN, DM, HLD, Iron deficiency anemia, Urinary Incontinence, sent from NH for Hb: 5. Patient is very demented and unable to give further details. Resting comfortably in bed, no abdominal pain, N/V/ hematochezia or hematemesis. Grandson at bedside, refers that patient has been having dark bowel movements for a couple of months back, before being admitted to NH for rehab. Patient was recently admitted on July 30th for syncopal episode, found to be anemic (Hb: 7.1), FOBT (+), underwent EGD which was negative for active bleeding, found to have bazoar in stomach. 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 GOC discussed at length with grandson: Yousuf. Patient remains FULL CODE. He wants everything to be done for the patient, "until she tells me to stop".

## 2018-08-23 NOTE — ED PROVIDER NOTE - PROGRESS NOTE DETAILS
d/w steven pt is full code pt with GIB, will admit pt pt with GIB, will admit pt, case d/w Dr. Izquierdo, will admit, B/P 111/68

## 2018-08-23 NOTE — ED ADULT NURSE REASSESSMENT NOTE - NS ED NURSE REASSESS COMMENT FT1
awaiting bed - pt has low h & h one unit prbc's in progress at 1645, 1800 hours swelling noted to the left arm - left a/c saline lock removed , warm compress - applied and ext elevated - Dr Mills made aware ( mar ) , new s/l inserted left hand

## 2018-08-24 DIAGNOSIS — Z51.5 ENCOUNTER FOR PALLIATIVE CARE: ICD-10-CM

## 2018-08-24 DIAGNOSIS — K92.2 GASTROINTESTINAL HEMORRHAGE, UNSPECIFIED: ICD-10-CM

## 2018-08-24 DIAGNOSIS — R53.81 OTHER MALAISE: ICD-10-CM

## 2018-08-24 DIAGNOSIS — G30.1 ALZHEIMER'S DISEASE WITH LATE ONSET: ICD-10-CM

## 2018-08-24 LAB
ANION GAP SERPL CALC-SCNC: 10 MMOL/L — SIGNIFICANT CHANGE UP (ref 5–17)
BASOPHILS # BLD AUTO: 0.1 K/UL — SIGNIFICANT CHANGE UP (ref 0–0.2)
BASOPHILS NFR BLD AUTO: 0.7 % — SIGNIFICANT CHANGE UP (ref 0–2)
BUN SERPL-MCNC: 59 MG/DL — HIGH (ref 7–18)
CALCIUM SERPL-MCNC: 9 MG/DL — SIGNIFICANT CHANGE UP (ref 8.4–10.5)
CHLORIDE SERPL-SCNC: 108 MMOL/L — SIGNIFICANT CHANGE UP (ref 96–108)
CO2 SERPL-SCNC: 23 MMOL/L — SIGNIFICANT CHANGE UP (ref 22–31)
CREAT SERPL-MCNC: 1.42 MG/DL — HIGH (ref 0.5–1.3)
CULTURE RESULTS: NO GROWTH — SIGNIFICANT CHANGE UP
EOSINOPHIL # BLD AUTO: 0 K/UL — SIGNIFICANT CHANGE UP (ref 0–0.5)
EOSINOPHIL NFR BLD AUTO: 0.3 % — SIGNIFICANT CHANGE UP (ref 0–6)
FOLATE SERPL-MCNC: 11.5 NG/ML — SIGNIFICANT CHANGE UP
GLUCOSE BLDC GLUCOMTR-MCNC: 111 MG/DL — HIGH (ref 70–99)
GLUCOSE BLDC GLUCOMTR-MCNC: 143 MG/DL — HIGH (ref 70–99)
GLUCOSE BLDC GLUCOMTR-MCNC: 149 MG/DL — HIGH (ref 70–99)
GLUCOSE BLDC GLUCOMTR-MCNC: 195 MG/DL — HIGH (ref 70–99)
GLUCOSE BLDC GLUCOMTR-MCNC: 257 MG/DL — HIGH (ref 70–99)
GLUCOSE SERPL-MCNC: 120 MG/DL — HIGH (ref 70–99)
HBA1C BLD-MCNC: 5.7 % — HIGH (ref 4–5.6)
HCT VFR BLD CALC: 19.8 % — CRITICAL LOW (ref 34.5–45)
HCT VFR BLD CALC: 20 % — CRITICAL LOW (ref 34.5–45)
HCT VFR BLD CALC: 22.3 % — LOW (ref 34.5–45)
HCT VFR BLD CALC: 23.2 % — LOW (ref 34.5–45)
HCT VFR BLD CALC: 24.8 % — LOW (ref 34.5–45)
HGB BLD-MCNC: 6.3 G/DL — CRITICAL LOW (ref 11.5–15.5)
HGB BLD-MCNC: 6.5 G/DL — CRITICAL LOW (ref 11.5–15.5)
HGB BLD-MCNC: 7.2 G/DL — LOW (ref 11.5–15.5)
HGB BLD-MCNC: 7.2 G/DL — LOW (ref 11.5–15.5)
HGB BLD-MCNC: 8.2 G/DL — LOW (ref 11.5–15.5)
LYMPHOCYTES # BLD AUTO: 17 % — SIGNIFICANT CHANGE UP (ref 13–44)
LYMPHOCYTES # BLD AUTO: 2.7 K/UL — SIGNIFICANT CHANGE UP (ref 1–3.3)
MAGNESIUM SERPL-MCNC: 1.7 MG/DL — SIGNIFICANT CHANGE UP (ref 1.6–2.6)
MCHC RBC-ENTMCNC: 26 PG — LOW (ref 27–34)
MCHC RBC-ENTMCNC: 26.3 PG — LOW (ref 27–34)
MCHC RBC-ENTMCNC: 26.5 PG — LOW (ref 27–34)
MCHC RBC-ENTMCNC: 26.8 PG — LOW (ref 27–34)
MCHC RBC-ENTMCNC: 27.5 PG — SIGNIFICANT CHANGE UP (ref 27–34)
MCHC RBC-ENTMCNC: 31.3 GM/DL — LOW (ref 32–36)
MCHC RBC-ENTMCNC: 32 GM/DL — SIGNIFICANT CHANGE UP (ref 32–36)
MCHC RBC-ENTMCNC: 32.1 GM/DL — SIGNIFICANT CHANGE UP (ref 32–36)
MCHC RBC-ENTMCNC: 32.3 GM/DL — SIGNIFICANT CHANGE UP (ref 32–36)
MCHC RBC-ENTMCNC: 33.2 GM/DL — SIGNIFICANT CHANGE UP (ref 32–36)
MCV RBC AUTO: 82 FL — SIGNIFICANT CHANGE UP (ref 80–100)
MCV RBC AUTO: 82.2 FL — SIGNIFICANT CHANGE UP (ref 80–100)
MCV RBC AUTO: 82.7 FL — SIGNIFICANT CHANGE UP (ref 80–100)
MCV RBC AUTO: 82.9 FL — SIGNIFICANT CHANGE UP (ref 80–100)
MCV RBC AUTO: 83.5 FL — SIGNIFICANT CHANGE UP (ref 80–100)
MONOCYTES # BLD AUTO: 1.9 K/UL — HIGH (ref 0–0.9)
MONOCYTES NFR BLD AUTO: 12 % — SIGNIFICANT CHANGE UP (ref 2–14)
NEUTROPHILS # BLD AUTO: 10.9 K/UL — HIGH (ref 1.8–7.4)
NEUTROPHILS NFR BLD AUTO: 70 % — SIGNIFICANT CHANGE UP (ref 43–77)
PHOSPHATE SERPL-MCNC: 3.7 MG/DL — SIGNIFICANT CHANGE UP (ref 2.5–4.5)
PLATELET # BLD AUTO: 245 K/UL — SIGNIFICANT CHANGE UP (ref 150–400)
PLATELET # BLD AUTO: 246 K/UL — SIGNIFICANT CHANGE UP (ref 150–400)
PLATELET # BLD AUTO: 249 K/UL — SIGNIFICANT CHANGE UP (ref 150–400)
PLATELET # BLD AUTO: 251 K/UL — SIGNIFICANT CHANGE UP (ref 150–400)
PLATELET # BLD AUTO: 251 K/UL — SIGNIFICANT CHANGE UP (ref 150–400)
POTASSIUM SERPL-MCNC: 4.5 MMOL/L — SIGNIFICANT CHANGE UP (ref 3.5–5.3)
POTASSIUM SERPL-SCNC: 4.5 MMOL/L — SIGNIFICANT CHANGE UP (ref 3.5–5.3)
RBC # BLD: 2.41 M/UL — LOW (ref 3.8–5.2)
RBC # BLD: 2.44 M/UL — LOW (ref 3.8–5.2)
RBC # BLD: 2.67 M/UL — LOW (ref 3.8–5.2)
RBC # BLD: 2.79 M/UL — LOW (ref 3.8–5.2)
RBC # BLD: 3 M/UL — LOW (ref 3.8–5.2)
RBC # FLD: 15.9 % — HIGH (ref 10.3–14.5)
RBC # FLD: 16.6 % — HIGH (ref 10.3–14.5)
RBC # FLD: 16.9 % — HIGH (ref 10.3–14.5)
RBC # FLD: 17.3 % — HIGH (ref 10.3–14.5)
RBC # FLD: 17.5 % — HIGH (ref 10.3–14.5)
SODIUM SERPL-SCNC: 141 MMOL/L — SIGNIFICANT CHANGE UP (ref 135–145)
SPECIMEN SOURCE: SIGNIFICANT CHANGE UP
VIT B12 SERPL-MCNC: 645 PG/ML — SIGNIFICANT CHANGE UP (ref 232–1245)
WBC # BLD: 15.1 K/UL — HIGH (ref 3.8–10.5)
WBC # BLD: 15.2 K/UL — HIGH (ref 3.8–10.5)
WBC # BLD: 15.6 K/UL — HIGH (ref 3.8–10.5)
WBC # BLD: 15.6 K/UL — HIGH (ref 3.8–10.5)
WBC # BLD: 17 K/UL — HIGH (ref 3.8–10.5)
WBC # FLD AUTO: 15.1 K/UL — HIGH (ref 3.8–10.5)
WBC # FLD AUTO: 15.2 K/UL — HIGH (ref 3.8–10.5)
WBC # FLD AUTO: 15.6 K/UL — HIGH (ref 3.8–10.5)
WBC # FLD AUTO: 15.6 K/UL — HIGH (ref 3.8–10.5)
WBC # FLD AUTO: 17 K/UL — HIGH (ref 3.8–10.5)

## 2018-08-24 PROCEDURE — 99222 1ST HOSP IP/OBS MODERATE 55: CPT

## 2018-08-24 PROCEDURE — 99223 1ST HOSP IP/OBS HIGH 75: CPT

## 2018-08-24 RX ORDER — FUROSEMIDE 40 MG
20 TABLET ORAL ONCE
Qty: 0 | Refills: 0 | Status: DISCONTINUED | OUTPATIENT
Start: 2018-08-24 | End: 2018-09-05

## 2018-08-24 RX ORDER — FUROSEMIDE 40 MG
20 TABLET ORAL ONCE
Qty: 0 | Refills: 0 | Status: COMPLETED | OUTPATIENT
Start: 2018-08-24 | End: 2018-08-24

## 2018-08-24 RX ORDER — ACETAMINOPHEN 500 MG
1000 TABLET ORAL ONCE
Qty: 0 | Refills: 0 | Status: COMPLETED | OUTPATIENT
Start: 2018-08-24 | End: 2018-08-24

## 2018-08-24 RX ADMIN — Medication 400 MILLIGRAM(S): at 11:40

## 2018-08-24 RX ADMIN — Medication 1: at 01:03

## 2018-08-24 RX ADMIN — Medication 1000 MILLIGRAM(S): at 13:10

## 2018-08-24 RX ADMIN — Medication 20 MILLIGRAM(S): at 20:35

## 2018-08-24 NOTE — CONSULT NOTE ADULT - PROBLEM SELECTOR RECOMMENDATION 2
H/H: 6.5/20. Pt currently receiving pRBCs. Plan EGD and colonoscopy Monday per GI. H/H: 6.5/20. Pt currently receiving pRBCs. HCP requesting to continue with plan of EGD and colonoscopy Monday; GI aware.

## 2018-08-24 NOTE — PROGRESS NOTE ADULT - SUBJECTIVE AND OBJECTIVE BOX
`PGY 1 Note discussed with supervising resident and primary attending    Patient is a 89y old  Female who presents with a chief complaint of anemia (23 Aug 2018 17:03)      INTERVAL HPI/OVERNIGHT EVENTS: Pt was unable to give history, confused, minimally verbal.    MEDICATIONS  (STANDING):  dextrose 5% + sodium chloride 0.9%. 1000 milliLiter(s) (75 mL/Hr) IV Continuous <Continuous>  dextrose 5%. 1000 milliLiter(s) (50 mL/Hr) IV Continuous <Continuous>  dextrose 50% Injectable 12.5 Gram(s) IV Push once  dextrose 50% Injectable 25 Gram(s) IV Push once  dextrose 50% Injectable 25 Gram(s) IV Push once  donepezil 10 milliGRAM(s) Oral at bedtime  furosemide   Injectable 20 milliGRAM(s) IV Push once  pantoprazole Infusion 8 mG/Hr (10 mL/Hr) IV Continuous <Continuous>    MEDICATIONS  (PRN):  dextrose 40% Gel 15 Gram(s) Oral once PRN Blood Glucose LESS THAN 70 milliGRAM(s)/deciliter  glucagon  Injectable 1 milliGRAM(s) IntraMuscular once PRN Glucose LESS THAN 70 milligrams/deciliter      __________________________________________________  REVIEW OF SYSTEMS: Not able to ask as pt has dementia.  Vital Signs Last 24 Hrs  T(C): 37.2 (24 Aug 2018 19:41), Max: 37.2 (24 Aug 2018 19:41)  T(F): 98.9 (24 Aug 2018 19:41), Max: 98.9 (24 Aug 2018 19:41)  HR: 105 (24 Aug 2018 19:41) (70 - 120)  BP: 127/67 (24 Aug 2018 19:41) (110/55 - 146/52)  BP(mean): --  RR: 18 (24 Aug 2018 19:41) (16 - 18)  SpO2: 100% (24 Aug 2018 19:41) (100% - 100%)    ________________________________________________  PHYSICAL EXAM:  GENERAL: pt seems confused, minimally verbal, NAD  HEENT: Normocephalic;  conjunctivae and sclerae clear; moist mucous membranes;   NECK : supple  CHEST/LUNG: Clear to auscultation bilaterally with good air entry   HEART: S1 S2  regular; no murmurs, gallops or rubs, heart rate elevated.  ABDOMEN: Soft, Nontender, Nondistended; Bowel sounds present  EXTREMITIES: no cyanosis; no edema; no calf tenderness  SKIN: warm and dry; no rash  NERVOUS SYSTEM:  Awake , confused, AOx2 .  _________________________________________________  LABS:                        8.2    15.2  )-----------( 251      ( 24 Aug 2018 20:12 )             24.8     08-24    141  |  108  |  59<H>  ----------------------------<  120<H>  4.5   |  23  |  1.42<H>    Ca    9.0      24 Aug 2018 07:45  Phos  3.7     08-24  Mg     1.7     08-24    TPro  6.8  /  Alb  2.6<L>  /  TBili  0.1<L>  /  DBili  x   /  AST  18  /  ALT  24  /  AlkPhos  66  08-23    PT/INR - ( 23 Aug 2018 14:38 )   PT: 13.2 sec;   INR: 1.21 ratio         PTT - ( 23 Aug 2018 14:38 )  PTT:26.7 sec  Urinalysis Basic - ( 23 Aug 2018 14:56 )    Color: Yellow / Appearance: Clear / S.010 / pH: x  Gluc: x / Ketone: Negative  / Bili: Negative / Urobili: Negative   Blood: x / Protein: 15 / Nitrite: Negative   Leuk Esterase: Trace / RBC: 0-2 /HPF / WBC 3-5 /HPF   Sq Epi: x / Non Sq Epi: Few /HPF / Bacteria: Trace /HPF      CAPILLARY BLOOD GLUCOSE      POCT Blood Glucose.: 149 mg/dL (24 Aug 2018 11:44)  POCT Blood Glucose.: 257 mg/dL (24 Aug 2018 08:13)  POCT Blood Glucose.: 143 mg/dL (24 Aug 2018 06:22)  POCT Blood Glucose.: 195 mg/dL (24 Aug 2018 00:13)        RADIOLOGY & ADDITIONAL TESTS:< from: Xray Chest 1 View-PORTABLE IMMEDIATE (18 @ 15:14) >        Imaging Personally Reviewed:  YES    Consultant(s) Notes Reviewed:   YES    Care Discussed with Consultants :YES    Plan of care was discussed with patient and /or primary care giver; all questions and concerns were addressed and care was aligned with patient's wishes.

## 2018-08-24 NOTE — CONSULT NOTE ADULT - ASSESSMENT
Anemia:  Patient was last admitted on July 30, 2018 with Hb- 7  US Liver and Pancreas at that time showed possible pancreatic mass. CT Abdomen with IV contrast showed Gastric outlet obstruction and gastric antrum thickening. EGD was done showing gastric bezoar, with no active signs of bleeding and no mass seen. She was recommended for outpatient f/u on enlarged lymph node seen on CT.  Patient now presenting with drop in Hg of 9--> 5.1 s/p 2U PRBC on admission    Plan:  - Monitor CBC  - Transfuse for Hb<7-8  - Protonix Anemia:  Patient was last admitted on July 30, 2018 with Hb- 7  US Liver and Pancreas at that time showed possible pancreatic mass. CT Abdomen with IV contrast showed Gastric outlet obstruction and gastric antrum thickening. EGD was done showing gastric bezoar, with no active signs of bleeding and no mass seen. She was recommended for outpatient f/u on enlarged lymph node seen on CT.  Patient now presenting with drop in Hg of 9--> 5.1 s/p 2U PRBC on admission    Plan:  - Monitor CBC  - Transfuse for Hb<7-8  - Protonix  - Advance diet as tolerated  - Plan for EGD and Colonoscopy on Monday 08/27  - Place an NG tube and prepare with 4L [place] on Sunday 08/26  - Clearliquid diet on Sunday 08/26  - NPO after midnight on 08/26

## 2018-08-24 NOTE — CONSULT NOTE ADULT - SUBJECTIVE AND OBJECTIVE BOX
HPI:  89yo female, from NH, AAOx1, with PMHx Dementia, HTN, DM, HLD, Iron deficiency anemia, Urinary Incontinence, sent from NH for Hb: 5. Patient is very demented and unable to give further details. Resting comfortably in bed, no abdominal pain, N/V/ hematochezia or hematemesis. Grandson at bedside, refers that patient has been having dark bowel movements for a couple of months back, before being admitted to NH for rehab. Patient was recently admitted on  for syncopal episode, found to be anemic (Hb: 7.1), FOBT (+), underwent EGD which was negative for active bleeding, found to have bazoar in stomach. 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 Grandson at bedside, refers that patient has been having dark bowel movements for a couple of months back, before being admitted to NH for rehab. GOC discussed at length with grandson: Yousuf. Patient remains FULL CODE. He wants everything to be done for the patient, "until she tells me to stop". (23 Aug 2018 17:03)    SUBJECTIVE: No new complaints. No acute events overnight.    PAST MEDICAL & SURGICAL HISTORY:  Dementia  Hyperchylomicronemia  Hypertension, unspecified type  Type 2 diabetes mellitus without complication, without long-term current use of insulin  No significant past surgical history      ALLERGIES: No Known Allergies      MEDS:  acetaminophen  IVPB. 1000 milliGRAM(s) IV Intermittent once  dextrose 40% Gel 15 Gram(s) Oral once PRN  dextrose 5% + sodium chloride 0.9%. 1000 milliLiter(s) IV Continuous <Continuous>  dextrose 5%. 1000 milliLiter(s) IV Continuous <Continuous>  dextrose 50% Injectable 12.5 Gram(s) IV Push once  dextrose 50% Injectable 25 Gram(s) IV Push once  dextrose 50% Injectable 25 Gram(s) IV Push once  donepezil 10 milliGRAM(s) Oral at bedtime  glucagon  Injectable 1 milliGRAM(s) IntraMuscular once PRN  pantoprazole Infusion 8 mG/Hr IV Continuous <Continuous>      SOCIAL HISTORY:  Smoker:  YES / NO        PACK YEARS:                         WHEN QUIT?  Occupation:  Live with:    FAMILY HISTORY:  Family history of hyperlipidemia  Family history of diabetes mellitus (Father, Mother)      VITALS:  Vital Signs Last 24 Hrs  T(C): 36.7 (24 Aug 2018 05:37), Max: 37.1 (23 Aug 2018 22:32)  T(F): 98 (24 Aug 2018 05:37), Max: 98.8 (23 Aug 2018 22:32)  HR: 70 (24 Aug 2018 05:37) (70 - 127)  BP: 110/55 (24 Aug 2018 05:37) (93/65 - 146/52)  BP(mean): --  RR: 17 (24 Aug 2018 05:37) (16 - 18)  SpO2: 100% (24 Aug 2018 05:37) (97% - 100%)      GENERAL: NAD  HEAD:  Atraumatic, Normocephalic  EYES: EOMI, PERRLA  ENMT: Moist mucous membranes  NECK: Supple  NERVOUS SYSTEM:  Alert & Oriented X3  CHEST/LUNG: Clear to auscultation bilaterally  HEART: Regular rate and rhythm  ABDOMEN: Soft, Nontender, Nondistended  EXTREMITIES:  2+ Peripheral Pulses      LABS/DIAGNOSTIC TESTS:                        7.2    17.0  )-----------( 251      ( 24 Aug 2018 09:11 )             23.2     WBC Count: 17.0 K/uL ( @ 09:11)  WBC Count: 15.6 K/uL ( @ 07:45)  WBC Count: 15.6 K/uL ( @ 01:08)  WBC Count: 16.0 K/uL ( @ 14:38)        141  |  108  |  59<H>  ----------------------------<  120<H>  4.5   |  23  |  1.42<H>    Ca    9.0      24 Aug 2018 07:45  Phos  3.7       Mg     1.7         TPro  6.8  /  Alb  2.6<L>  /  TBili  0.1<L>  /  DBili  x   /  AST  18  /  ALT  24  /  AlkPhos  66        Urinalysis Basic - ( 23 Aug 2018 14:56 )    Color: Yellow / Appearance: Clear / S.010 / pH: x  Gluc: x / Ketone: Negative  / Bili: Negative / Urobili: Negative   Blood: x / Protein: 15 / Nitrite: Negative   Leuk Esterase: Trace / RBC: 0-2 /HPF / WBC 3-5 /HPF   Sq Epi: x / Non Sq Epi: Few /HPF / Bacteria: Trace /HPF        LIVER FUNCTIONS - ( 23 Aug 2018 14:38 )  Alb: 2.6 g/dL / Pro: 6.8 g/dL / ALK PHOS: 66 U/L / ALT: 24 U/L DA / AST: 18 U/L / GGT: x             PT/INR - ( 23 Aug 2018 14:38 )   PT: 13.2 sec;   INR: 1.21 ratio         PTT - ( 23 Aug 2018 14:38 )  PTT:26.7 sec    LACTATE:    ABG -     CULTURES:   .Urine Catheterized   @ 23:24   No growth  --  --          RADIOLOGY:        ROS  [  ] UNABLE TO ELICIT HPI:  89yo female, from NH, AAOx1, with PMHx Dementia, HTN, DM, HLD, Iron deficiency anemia, Urinary Incontinence, sent from NH for Hb: 5. Patient is very demented and unable to give further details. Resting comfortably in bed, no abdominal pain, N/V/ hematochezia or hematemesis. Grandson at bedside, refers that patient has been having dark bowel movements for a couple of months back, before being admitted to NH for rehab. Patient was recently admitted on  for syncopal episode, found to be anemic (Hb: 7.1), FOBT (+), underwent EGD which was negative for active bleeding, found to have bazoar in stomach. 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 Grandson at bedside, refers that patient has been having dark bowel movements for a couple of months back, before being admitted to NH for rehab. GOC discussed at length with grandson: Yousuf. Patient remains FULL CODE. He wants everything to be done for the patient, "until she tells me to stop". (23 Aug 2018 17:03)    SUBJECTIVE: Grandson at bedside who expressed concern regarding drop in hemoglobin. Patient lyng down comfortably. No acute complaints.    PAST MEDICAL & SURGICAL HISTORY:  Dementia  Hyperchylomicronemia  Hypertension, unspecified type  Type 2 diabetes mellitus without complication, without long-term current use of insulin  No significant past surgical history      ALLERGIES: No Known Allergies      MEDS:  acetaminophen  IVPB. 1000 milliGRAM(s) IV Intermittent once  dextrose 40% Gel 15 Gram(s) Oral once PRN  dextrose 5% + sodium chloride 0.9%. 1000 milliLiter(s) IV Continuous <Continuous>  dextrose 5%. 1000 milliLiter(s) IV Continuous <Continuous>  dextrose 50% Injectable 12.5 Gram(s) IV Push once  dextrose 50% Injectable 25 Gram(s) IV Push once  dextrose 50% Injectable 25 Gram(s) IV Push once  donepezil 10 milliGRAM(s) Oral at bedtime  glucagon  Injectable 1 milliGRAM(s) IntraMuscular once PRN  pantoprazole Infusion 8 mG/Hr IV Continuous <Continuous>      SOCIAL HISTORY:  Smoker:  YES / NO        PACK YEARS:                         WHEN QUIT?  Occupation:  Live with:    FAMILY HISTORY:  Family history of hyperlipidemia  Family history of diabetes mellitus (Father, Mother)      VITALS:  Vital Signs Last 24 Hrs  T(C): 36.7 (24 Aug 2018 05:37), Max: 37.1 (23 Aug 2018 22:32)  T(F): 98 (24 Aug 2018 05:37), Max: 98.8 (23 Aug 2018 22:32)  HR: 70 (24 Aug 2018 05:37) (70 - 127)  BP: 110/55 (24 Aug 2018 05:37) (93/65 - 146/52)  BP(mean): --  RR: 17 (24 Aug 2018 05:37) (16 - 18)  SpO2: 100% (24 Aug 2018 05:37) (97% - 100%)      GENERAL: NAD  HEAD:  Atraumatic, Normocephalic  EYES: EOMI, PERRLA  ENMT: Moist mucous membranes  NECK: Supple  NERVOUS SYSTEM:  Alert & Oriented X3  CHEST/LUNG: Clear to auscultation bilaterally  HEART: Regular rate and rhythm  ABDOMEN: Soft, Nontender, Nondistended  EXTREMITIES:  2+ Peripheral Pulses      LABS/DIAGNOSTIC TESTS:                        7.2    17.0  )-----------( 251      ( 24 Aug 2018 09:11 )             23.2     WBC Count: 17.0 K/uL ( @ 09:11)  WBC Count: 15.6 K/uL ( @ 07:45)  WBC Count: 15.6 K/uL ( @ 01:08)  WBC Count: 16.0 K/uL ( @ 14:38)        141  |  108  |  59<H>  ----------------------------<  120<H>  4.5   |  23  |  1.42<H>    Ca    9.0      24 Aug 2018 07:45  Phos  3.7       Mg     1.7         TPro  6.8  /  Alb  2.6<L>  /  TBili  0.1<L>  /  DBili  x   /  AST  18  /  ALT  24  /  AlkPhos  66        Urinalysis Basic - ( 23 Aug 2018 14:56 )    Color: Yellow / Appearance: Clear / S.010 / pH: x  Gluc: x / Ketone: Negative  / Bili: Negative / Urobili: Negative   Blood: x / Protein: 15 / Nitrite: Negative   Leuk Esterase: Trace / RBC: 0-2 /HPF / WBC 3-5 /HPF   Sq Epi: x / Non Sq Epi: Few /HPF / Bacteria: Trace /HPF        LIVER FUNCTIONS - ( 23 Aug 2018 14:38 )  Alb: 2.6 g/dL / Pro: 6.8 g/dL / ALK PHOS: 66 U/L / ALT: 24 U/L DA / AST: 18 U/L / GGT: x             PT/INR - ( 23 Aug 2018 14:38 )   PT: 13.2 sec;   INR: 1.21 ratio         PTT - ( 23 Aug 2018 14:38 )  PTT:26.7 sec    LACTATE:    ABG -     CULTURES:   .Urine Catheterized   @ 23:24   No growth  --  --          RADIOLOGY:        ROS  [  ] UNABLE TO ELICIT

## 2018-08-24 NOTE — CONSULT NOTE ADULT - PROBLEM SELECTOR RECOMMENDATION 4
Pt's primary HCP = steven/Yousuf Cruz ([phone]). Spoke with pt's primary HCP = grandson/Yousuf Cruz ([phone]) on the phone. Discussed status and educated on dementia disease trajectory. Discussed risks vs benefits of resuscitation, intubation, artificial nutrition, trach. HCP reports, "If it's her time, it's her time. I don't' want her last month or 2 breathing with tubes. If that happens, she will give up. I'm realistic. She's approaching 89 and she's lived a full life." Reviewed MOLST. Patient was a full code, now DNR / trial intubation / send to hospital / trial feeding tube / trial IV fluids / use abx / no tracheostomy. All questions answered. DNR / MOLST forms completed as per HCP's wishes.

## 2018-08-24 NOTE — CONSULT NOTE ADULT - PROBLEM SELECTOR RECOMMENDATION 3
2/2 advancing dementia. Pt A&O x 2, nonambulatory, 2 person assist with ADLs, incontinent, variable PO intake per NH report. Pt requires 24 hr care.

## 2018-08-24 NOTE — PROGRESS NOTE ADULT - SUBJECTIVE AND OBJECTIVE BOX
HPI:  89yo female, from NH, AAOx1, with PMHx Dementia, HTN, DM, HLD, Iron deficiency anemia, Urinary Incontinence, sent from NH for Hb: 5. Patient is very demented and unable to give further details. Resting comfortably in bed, no abdominal pain, N/V/ hematochezia or hematemesis. Grandson at bedside, refers that patient has been having dark bowel movements for a couple of months back, before being admitted to NH for rehab. Patient was recently admitted on  for syncopal episode, found to be anemic (Hb: 7.1), FOBT (+), underwent EGD which was negative for active bleeding, found to have bazoar in stomach. 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 GOC discussed at length with grandson: Yousuf. Patient remains FULL CODE. He wants everything to be done for the patient, "until she tells me to stop". (23 Aug 2018 17:03)      Patient is a 89y old  Female who presents with a chief complaint of anemia (23 Aug 2018 17:03)      INTERVAL HPI/OVERNIGHT EVENTS:  T(C): 36.3 (18 @ 13:47), Max: 37.1 (18 @ 22:32)  HR: 106 (-18 @ 13:47) (70 - 127)  BP: 127/55 (24-18 @ 13:47) (105/52 - 146/52)  RR: 17 (-18 @ 13:47) (16 - 18)  SpO2: 100% (18 @ 13:47) (97% - 100%)  Wt(kg): --  I&O's Summary      REVIEW OF SYSTEMS: denies fever, chills, SOB, palpitations, chest pain, abdominal pain, nausea, vomitting, diarrhea, constipation, dizziness    MEDICATIONS  (STANDING):  dextrose 5% + sodium chloride 0.9%. 1000 milliLiter(s) (75 mL/Hr) IV Continuous <Continuous>  dextrose 5%. 1000 milliLiter(s) (50 mL/Hr) IV Continuous <Continuous>  dextrose 50% Injectable 12.5 Gram(s) IV Push once  dextrose 50% Injectable 25 Gram(s) IV Push once  dextrose 50% Injectable 25 Gram(s) IV Push once  donepezil 10 milliGRAM(s) Oral at bedtime  pantoprazole Infusion 8 mG/Hr (10 mL/Hr) IV Continuous <Continuous>    MEDICATIONS  (PRN):  dextrose 40% Gel 15 Gram(s) Oral once PRN Blood Glucose LESS THAN 70 milliGRAM(s)/deciliter  glucagon  Injectable 1 milliGRAM(s) IntraMuscular once PRN Glucose LESS THAN 70 milligrams/deciliter      PHYSICAL EXAM:  GENERAL: NAD, well-groomed, well-developed  HEAD:  Atraumatic, Normocephalic  EYES: EOMI, PERRLA, conjunctiva and sclera clear  ENMT: No tonsillar erythema, exudates, or enlargement; Moist mucous membranes, Good dentition, No lesions  NECK: Supple, No JVD, Normal thyroid  NERVOUS SYSTEM:  Alert & Oriented X3, Good concentration; Motor Strength 5/5 B/L upper and lower extremities; DTRs 2+ intact and symmetric  CHEST/LUNG: Clear to percussion bilaterally; No rales, rhonchi, wheezing, or rubs  HEART: Regular rate and rhythm; No murmurs, rubs, or gallops  ABDOMEN: Soft, Nontender, Nondistended; Bowel sounds present  EXTREMITIES:  2+ Peripheral Pulses, No clubbing, cyanosis, or edema  LYMPH: No lymphadenopathy noted  SKIN: No rashes or lesions  LABS:                        6.5    15.1  )-----------( 246      ( 24 Aug 2018 13:56 )             20.0     08-24    141  |  108  |  59<H>  ----------------------------<  120<H>  4.5   |  23  |  1.42<H>    Ca    9.0      24 Aug 2018 07:45  Phos  3.7     08-24  Mg     1.7     08-24    TPro  6.8  /  Alb  2.6<L>  /  TBili  0.1<L>  /  DBili  x   /  AST  18  /  ALT  24  /  AlkPhos  66  08-23    PT/INR - ( 23 Aug 2018 14:38 )   PT: 13.2 sec;   INR: 1.21 ratio         PTT - ( 23 Aug 2018 14:38 )  PTT:26.7 sec  Urinalysis Basic - ( 23 Aug 2018 14:56 )    Color: Yellow / Appearance: Clear / S.010 / pH: x  Gluc: x / Ketone: Negative  / Bili: Negative / Urobili: Negative   Blood: x / Protein: 15 / Nitrite: Negative   Leuk Esterase: Trace / RBC: 0-2 /HPF / WBC 3-5 /HPF   Sq Epi: x / Non Sq Epi: Few /HPF / Bacteria: Trace /HPF      CAPILLARY BLOOD GLUCOSE      POCT Blood Glucose.: 149 mg/dL (24 Aug 2018 11:44)  POCT Blood Glucose.: 257 mg/dL (24 Aug 2018 08:13)  POCT Blood Glucose.: 143 mg/dL (24 Aug 2018 06:22)  POCT Blood Glucose.: 195 mg/dL (24 Aug 2018 00:13)        Urinalysis Basic - ( 23 Aug 2018 14:56 )    Color: Yellow / Appearance: Clear / S.010 / pH: x  Gluc: x / Ketone: Negative  / Bili: Negative / Urobili: Negative   Blood: x / Protein: 15 / Nitrite: Negative   Leuk Esterase: Trace / RBC: 0-2 /HPF / WBC 3-5 /HPF   Sq Epi: x / Non Sq Epi: Few /HPF / Bacteria: Trace /HPF

## 2018-08-24 NOTE — CONSULT NOTE ADULT - ATTENDING COMMENTS
I was physically present for the key portions of the evaluation and management (E/M) service provided.  The patient was personally seen and examined at bedside.  I reviewed the resident's  H& P , ROS,  Exam, assessment and plan. VS and labs  were personally reviewed.   I agree with  the all of the above with the following additions:    Summary:  Persistent anemia. Family wants "everything to be done". Plan for Repeat EGD (did not get a full view last procedure) and colonoscopy on Monday. Place NGT if will not prep.             Thank you for your consultation and allowing  me to participate in the care of your patients. If you have further questions please contact me at [phone].

## 2018-08-24 NOTE — CONSULT NOTE ADULT - PROBLEM SELECTOR RECOMMENDATION 9
FAST 7C. Per NH report, pt A&O x 2, nonambulatory, 2 person assist with ADLs, incontinent x 2, + variable PO intake at baseline. Pt requires 24 hr care. Patient's grandson is primary HCP. Full code on file. FAST 7C. Per NH report, pt A&O x 2, nonambulatory, 2 person assist with ADLs, incontinent x 2, + variable PO intake at baseline. Pt requires 24 hr care. Spoke with patient's grandson/primary HCP, kirstin - discussed status and educated on disease trajectories. Patient was a full code; now DNR.

## 2018-08-24 NOTE — CONSULT NOTE ADULT - SUBJECTIVE AND OBJECTIVE BOX
HPI:  89yo female, from NH, AAOx1, with PMHx Dementia, HTN, DM, HLD, Iron deficiency anemia, Urinary Incontinence, sent from NH for Hb: 5. Patient is very demented and unable to give further details. Resting comfortably in bed, no abdominal pain, N/V/ hematochezia or hematemesis. Grandson at bedside, refers that patient has been having dark bowel movements for a couple of months back, before being admitted to NH for rehab. Patient was recently admitted on  for syncopal episode, found to be anemic (Hb: 7.1), FOBT (+), underwent EGD which was negative for active bleeding, found to have bazoar in stomach. 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 GOC discussed at length with grandson: Yousuf. Patient remains FULL CODE. He wants everything to be done for the patient, "until she tells me to stop". (23 Aug 2018 17:03)      PAST MEDICAL & SURGICAL HISTORY:  Dementia  Hyperchylomicronemia  Hypertension, unspecified type  Type 2 diabetes mellitus without complication, without long-term current use of insulin  No significant past surgical history      SOCIAL HISTORY:    Admitted from: [facility]  Jehovah's witness:        Voodoo                            Preferred Language: English    Surrogate/HCP/Guardian: Yousuf Cruz (grandson/primary HCP): [phone]    FAMILY HISTORY:  Family history of hyperlipidemia  Family history of diabetes mellitus (Father, Mother)    Baseline ADLs (prior to admission): Per NH, pt nonambulatory, wheel chair bound, 2 person assist with ADLs, A&O x 2, incontinent, variable PO intake    No Known Allergies    Present Symptoms:        Review of Systems:  Unable to obtain due to poor mentation    MEDICATIONS  (STANDING):  dextrose 5% + sodium chloride 0.9%. 1000 milliLiter(s) (75 mL/Hr) IV Continuous <Continuous>  dextrose 5%. 1000 milliLiter(s) (50 mL/Hr) IV Continuous <Continuous>  dextrose 50% Injectable 12.5 Gram(s) IV Push once  dextrose 50% Injectable 25 Gram(s) IV Push once  dextrose 50% Injectable 25 Gram(s) IV Push once  donepezil 10 milliGRAM(s) Oral at bedtime  pantoprazole Infusion 8 mG/Hr (10 mL/Hr) IV Continuous <Continuous>    MEDICATIONS  (PRN):  dextrose 40% Gel 15 Gram(s) Oral once PRN Blood Glucose LESS THAN 70 milliGRAM(s)/deciliter  glucagon  Injectable 1 milliGRAM(s) IntraMuscular once PRN Glucose LESS THAN 70 milligrams/deciliter      PHYSICAL EXAM:    Vital Signs Last 24 Hrs  T(C): 36.9 (24 Aug 2018 15:21), Max: 37.1 (23 Aug 2018 22:32)  T(F): 98.5 (24 Aug 2018 15:21), Max: 98.8 (23 Aug 2018 22:32)  HR: 99 (24 Aug 2018 15:21) (70 - 127)  BP: 132/50 (24 Aug 2018 15:21) (105/52 - 146/52)  BP(mean): --  RR: 16 (24 Aug 2018 15:21) (16 - 18)  SpO2: 100% (24 Aug 2018 15:21) (97% - 100%)    General: alert  oriented x 2, confused, minimally verbal  Karnofsky Performance Score/Palliative Performance Status Version2:     30%    HEENT: dry lips  Lungs: comfortable   CV:    tachycardia  GI:   incontinent  :  incontinent   Musculoskeletal: nonambulatory, assist with ADLs, +1 bilateral  strength  Neuro: cognitive impairment, dysphagia  Oral intake ability: unable/only mouth care   Diet: NPO    LABS:                        6.5    15.1  )-----------( 246      ( 24 Aug 2018 13:56 )             20.0     08-24    141  |  108  |  59<H>  ----------------------------<  120<H>  4.5   |  23  |  1.42<H>    Ca    9.0      24 Aug 2018 07:45  Phos  3.7     08-24  Mg     1.7     08-24    TPro  6.8  /  Alb  2.6<L>  /  TBili  0.1<L>  /  DBili  x   /  AST  18  /  ALT  24  /  AlkPhos  66  08-23    Urinalysis Basic - ( 23 Aug 2018 14:56 )    Color: Yellow / Appearance: Clear / S.010 / pH: x  Gluc: x / Ketone: Negative  / Bili: Negative / Urobili: Negative   Blood: x / Protein: 15 / Nitrite: Negative   Leuk Esterase: Trace / RBC: 0-2 /HPF / WBC 3-5 /HPF   Sq Epi: x / Non Sq Epi: Few /HPF / Bacteria: Trace /HPF    albumin: 2.6    RADIOLOGY & ADDITIONAL STUDIES: reviewed    ADVANCE DIRECTIVES: HCP on file; Full code. HPI:  89yo female, from NH, AAOx1, with PMHx Dementia, HTN, DM, HLD, Iron deficiency anemia, Urinary Incontinence, sent from NH for Hb: 5. Patient is very demented and unable to give further details. Resting comfortably in bed, no abdominal pain, N/V/ hematochezia or hematemesis. Grandson at bedside, refers that patient has been having dark bowel movements for a couple of months back, before being admitted to NH for rehab. Patient was recently admitted on  for syncopal episode, found to be anemic (Hb: 7.1), FOBT (+), underwent EGD which was negative for active bleeding, found to have bazoar in stomach. 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 GOC discussed at length with grandson: Yousuf. Patient remains FULL CODE. He wants everything to be done for the patient, "until she tells me to stop". (23 Aug 2018 17:03)    PAST MEDICAL & SURGICAL HISTORY:  Dementia  Hyperchylomicronemia  Hypertension, unspecified type  Type 2 diabetes mellitus without complication, without long-term current use of insulin  No significant past surgical history    SOCIAL HISTORY:    Admitted from: [facility]  Hindu:        Amish                            Preferred Language: English    Surrogate/HCP/Guardian: Yousuf Cruz (grandson/primary HCP): [phone]    FAMILY HISTORY:  Family history of hyperlipidemia  Family history of diabetes mellitus (Father, Mother)    Baseline ADLs (prior to admission): Per NH, pt nonambulatory, wheel chair bound, 2 person assist with ADLs, A&O x 2, incontinent, variable PO intake    No Known Allergies    Present Symptoms:        Review of Systems:  Unable to obtain due to poor mentation    MEDICATIONS  (STANDING):  dextrose 5% + sodium chloride 0.9%. 1000 milliLiter(s) (75 mL/Hr) IV Continuous <Continuous>  dextrose 5%. 1000 milliLiter(s) (50 mL/Hr) IV Continuous <Continuous>  dextrose 50% Injectable 12.5 Gram(s) IV Push once  dextrose 50% Injectable 25 Gram(s) IV Push once  dextrose 50% Injectable 25 Gram(s) IV Push once  donepezil 10 milliGRAM(s) Oral at bedtime  pantoprazole Infusion 8 mG/Hr (10 mL/Hr) IV Continuous <Continuous>    MEDICATIONS  (PRN):  dextrose 40% Gel 15 Gram(s) Oral once PRN Blood Glucose LESS THAN 70 milliGRAM(s)/deciliter  glucagon  Injectable 1 milliGRAM(s) IntraMuscular once PRN Glucose LESS THAN 70 milligrams/deciliter    PHYSICAL EXAM:    Vital Signs Last 24 Hrs  T(C): 36.9 (24 Aug 2018 15:21), Max: 37.1 (23 Aug 2018 22:32)  T(F): 98.5 (24 Aug 2018 15:21), Max: 98.8 (23 Aug 2018 22:32)  HR: 99 (24 Aug 2018 15:21) (70 - 127)  BP: 132/50 (24 Aug 2018 15:21) (105/52 - 146/52)  BP(mean): --  RR: 16 (24 Aug 2018 15:21) (16 - 18)  SpO2: 100% (24 Aug 2018 15:21) (97% - 100%)    General: alert  oriented x 2, confused, minimally verbal  Karnofsky Performance Score/Palliative Performance Status Version2: 30%    HEENT: dry lips  Lungs: comfortable   CV:    tachycardia  GI:   incontinent  :  incontinent   Musculoskeletal: nonambulatory, assist with ADLs, +1 bilateral  strength  Neuro: cognitive impairment, dysphagia  Oral intake ability: unable/only mouth care   Diet: NPO    LABS:                        6.5    15.1  )-----------( 246      ( 24 Aug 2018 13:56 )             20.0     08-24    141  |  108  |  59<H>  ----------------------------<  120<H>  4.5   |  23  |  1.42<H>    Ca    9.0      24 Aug 2018 07:45  Phos  3.7     08-24  Mg     1.7     08-24    TPro  6.8  /  Alb  2.6<L>  /  TBili  0.1<L>  /  DBili  x   /  AST  18  /  ALT  24  /  AlkPhos  66  08-23    Urinalysis Basic - ( 23 Aug 2018 14:56 )    Color: Yellow / Appearance: Clear / S.010 / pH: x  Gluc: x / Ketone: Negative  / Bili: Negative / Urobili: Negative   Blood: x / Protein: 15 / Nitrite: Negative   Leuk Esterase: Trace / RBC: 0-2 /HPF / WBC 3-5 /HPF   Sq Epi: x / Non Sq Epi: Few /HPF / Bacteria: Trace /HPF    albumin: 2.6    RADIOLOGY & ADDITIONAL STUDIES: reviewed    ADVANCE DIRECTIVES: HCP on file; Full code. HPI:  89yo female, from NH, AAOx1, with PMHx Dementia, HTN, DM, HLD, Iron deficiency anemia, Urinary Incontinence, sent from NH for Hb: 5. Patient is very demented and unable to give further details. Resting comfortably in bed, no abdominal pain, N/V/ hematochezia or hematemesis. Grandson at bedside, refers that patient has been having dark bowel movements for a couple of months back, before being admitted to NH for rehab. Patient was recently admitted on  for syncopal episode, found to be anemic (Hb: 7.1), FOBT (+), underwent EGD which was negative for active bleeding, found to have bazoar in stomach. 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 GOC discussed at length with grandson: Yousuf. Patient remains FULL CODE. He wants everything to be done for the patient, "until she tells me to stop". (23 Aug 2018 17:03).    Request to readdress goals of care given patient's advancing dementia, recurrent hospitalizations.     PAST MEDICAL & SURGICAL HISTORY:  Dementia  Hyperchylomicronemia  Hypertension, unspecified type  Type 2 diabetes mellitus without complication, without long-term current use of insulin  No significant past surgical history    SOCIAL HISTORY:    [place] [facility] [place]on:        Alevism                            Preferred Language: English    Surrogate/HCP/Guardian: Yousuf Cruz (grandson/primary HCP): [phone]    FAMILY HISTORY:  Family history of hyperlipidemia  Family history of diabetes mellitus (Father, Mother)    Baseline ADLs (prior to admission): Per NH, pt nonambulatory, wheel chair bound, 2 person assist with ADLs, A&O x 2, incontinent, variable PO intake    No Known Allergies    Present Symptoms:        Review of Systems:  Unable to obtain due to poor mentation    MEDICATIONS  (STANDING):  dextrose 5% + sodium chloride 0.9%. 1000 milliLiter(s) (75 mL/Hr) IV Continuous <Continuous>  dextrose 5%. 1000 milliLiter(s) (50 mL/Hr) IV Continuous <Continuous>  dextrose 50% Injectable 12.5 Gram(s) IV Push once  dextrose 50% Injectable 25 Gram(s) IV Push once  dextrose 50% Injectable 25 Gram(s) IV Push once  donepezil 10 milliGRAM(s) Oral at bedtime  pantoprazole Infusion 8 mG/Hr (10 mL/Hr) IV Continuous <Continuous>    MEDICATIONS  (PRN):  dextrose 40% Gel 15 Gram(s) Oral once PRN Blood Glucose LESS THAN 70 milliGRAM(s)/deciliter  glucagon  Injectable 1 milliGRAM(s) IntraMuscular once PRN Glucose LESS THAN 70 milligrams/deciliter    PHYSICAL EXAM:    Vital Signs Last 24 Hrs  T(C): 36.9 (24 Aug 2018 15:21), Max: 37.1 (23 Aug 2018 22:32)  T(F): 98.5 (24 Aug 2018 15:21), Max: 98.8 (23 Aug 2018 22:32)  HR: 99 (24 Aug 2018 15:21) (70 - 127)  BP: 132/50 (24 Aug 2018 15:21) (105/52 - 146/52)  BP(mean): --  RR: 16 (24 Aug 2018 15:21) (16 - 18)  SpO2: 100% (24 Aug 2018 15:21) (97% - 100%)    General: alert  oriented x 2, confused, minimally verbal  Karnofsky Performance Score/Palliative Performance Status Version2: 30%    HEENT: dry lips  Lungs: comfortable   CV:    tachycardia  GI:   incontinent  :  incontinent   Musculoskeletal: nonambulatory, assist with ADLs, +1 bilateral  strength  Neuro: cognitive impairment, dysphagia  Oral intake ability: unable/only mouth care   Diet: NPO    LABS:                        6.5    15.1  )-----------( 246      ( 24 Aug 2018 13:56 )             20.0     08-24    141  |  108  |  59<H>  ----------------------------<  120<H>  4.5   |  23  |  1.42<H>    Ca    9.0      24 Aug 2018 07:45  Phos  3.7     08-24  Mg     1.7     08-24    TPro  6.8  /  Alb  2.6<L>  /  TBili  0.1<L>  /  DBili  x   /  AST  18  /  ALT  24  /  AlkPhos  66  08-23    Urinalysis Basic - ( 23 Aug 2018 14:56 )    Color: Yellow / Appearance: Clear / S.010 / pH: x  Gluc: x / Ketone: Negative  / Bili: Negative / Urobili: Negative   Blood: x / Protein: 15 / Nitrite: Negative   Leuk Esterase: Trace / RBC: 0-2 /HPF / WBC 3-5 /HPF   Sq Epi: x / Non Sq Epi: Few /HPF / Bacteria: Trace /HPF    albumin: 2.6    RADIOLOGY & ADDITIONAL STUDIES: reviewed    ADVANCE DIRECTIVES: HCP on file. DNR / MOLST forms completed as per HCP's wishes: DNR / trial intubation / send to hospital / trial feeding tube / trial IV fluids / use abx / no tracheostomy.

## 2018-08-24 NOTE — PROGRESS NOTE ADULT - PROBLEM SELECTOR PLAN 1
Patient p/w Hb: 5.1, FOBT positive w/ black hard stools. with baseline Hb: 9-10.   Patient not having active bowel movements at the moment, not vomiting, asymptomatic.   EGD August 2018: no signs of active GI bleed  Keep Hb >8, follow CBC q6hrs for now  c/w Protonix gtt  Pt has been transfused 4 PRBC. Recent Hb on 8/24 is 8.1.  Planned for colonoscopy and EGD  on Monday.  Clear liquid diet.

## 2018-08-25 ENCOUNTER — TRANSCRIPTION ENCOUNTER (OUTPATIENT)
Age: 83
End: 2018-08-25

## 2018-08-25 LAB
ANION GAP SERPL CALC-SCNC: 10 MMOL/L — SIGNIFICANT CHANGE UP (ref 5–17)
BUN SERPL-MCNC: 40 MG/DL — HIGH (ref 7–18)
CALCIUM SERPL-MCNC: 9.2 MG/DL — SIGNIFICANT CHANGE UP (ref 8.4–10.5)
CHLORIDE SERPL-SCNC: 106 MMOL/L — SIGNIFICANT CHANGE UP (ref 96–108)
CO2 SERPL-SCNC: 25 MMOL/L — SIGNIFICANT CHANGE UP (ref 22–31)
CREAT SERPL-MCNC: 1.24 MG/DL — SIGNIFICANT CHANGE UP (ref 0.5–1.3)
GLUCOSE BLDC GLUCOMTR-MCNC: 144 MG/DL — HIGH (ref 70–99)
GLUCOSE BLDC GLUCOMTR-MCNC: 155 MG/DL — HIGH (ref 70–99)
GLUCOSE SERPL-MCNC: 104 MG/DL — HIGH (ref 70–99)
HCT VFR BLD CALC: 25.9 % — LOW (ref 34.5–45)
HCT VFR BLD CALC: 26.4 % — LOW (ref 34.5–45)
HCT VFR BLD CALC: 27.1 % — LOW (ref 34.5–45)
HGB BLD-MCNC: 8.6 G/DL — LOW (ref 11.5–15.5)
HGB BLD-MCNC: 8.9 G/DL — LOW (ref 11.5–15.5)
HGB BLD-MCNC: 9 G/DL — LOW (ref 11.5–15.5)
MAGNESIUM SERPL-MCNC: 1.5 MG/DL — LOW (ref 1.6–2.6)
MCHC RBC-ENTMCNC: 27 PG — SIGNIFICANT CHANGE UP (ref 27–34)
MCHC RBC-ENTMCNC: 27.4 PG — SIGNIFICANT CHANGE UP (ref 27–34)
MCHC RBC-ENTMCNC: 27.4 PG — SIGNIFICANT CHANGE UP (ref 27–34)
MCHC RBC-ENTMCNC: 33.2 GM/DL — SIGNIFICANT CHANGE UP (ref 32–36)
MCHC RBC-ENTMCNC: 33.4 GM/DL — SIGNIFICANT CHANGE UP (ref 32–36)
MCHC RBC-ENTMCNC: 33.6 GM/DL — SIGNIFICANT CHANGE UP (ref 32–36)
MCV RBC AUTO: 81.3 FL — SIGNIFICANT CHANGE UP (ref 80–100)
MCV RBC AUTO: 81.7 FL — SIGNIFICANT CHANGE UP (ref 80–100)
MCV RBC AUTO: 82.1 FL — SIGNIFICANT CHANGE UP (ref 80–100)
PLATELET # BLD AUTO: 262 K/UL — SIGNIFICANT CHANGE UP (ref 150–400)
PLATELET # BLD AUTO: 276 K/UL — SIGNIFICANT CHANGE UP (ref 150–400)
PLATELET # BLD AUTO: 299 K/UL — SIGNIFICANT CHANGE UP (ref 150–400)
POTASSIUM SERPL-MCNC: 4.1 MMOL/L — SIGNIFICANT CHANGE UP (ref 3.5–5.3)
POTASSIUM SERPL-SCNC: 4.1 MMOL/L — SIGNIFICANT CHANGE UP (ref 3.5–5.3)
RBC # BLD: 3.19 M/UL — LOW (ref 3.8–5.2)
RBC # BLD: 3.23 M/UL — LOW (ref 3.8–5.2)
RBC # BLD: 3.3 M/UL — LOW (ref 3.8–5.2)
RBC # FLD: 15.8 % — HIGH (ref 10.3–14.5)
RBC # FLD: 15.8 % — HIGH (ref 10.3–14.5)
RBC # FLD: 15.9 % — HIGH (ref 10.3–14.5)
SODIUM SERPL-SCNC: 141 MMOL/L — SIGNIFICANT CHANGE UP (ref 135–145)
WBC # BLD: 14.6 K/UL — HIGH (ref 3.8–10.5)
WBC # BLD: 15.6 K/UL — HIGH (ref 3.8–10.5)
WBC # BLD: 16.5 K/UL — HIGH (ref 3.8–10.5)
WBC # FLD AUTO: 14.6 K/UL — HIGH (ref 3.8–10.5)
WBC # FLD AUTO: 15.6 K/UL — HIGH (ref 3.8–10.5)
WBC # FLD AUTO: 16.5 K/UL — HIGH (ref 3.8–10.5)

## 2018-08-25 PROCEDURE — 99232 SBSQ HOSP IP/OBS MODERATE 35: CPT

## 2018-08-25 RX ORDER — ACETAMINOPHEN 500 MG
1055 TABLET ORAL EVERY 6 HOURS
Qty: 0 | Refills: 0 | Status: DISCONTINUED | OUTPATIENT
Start: 2018-08-25 | End: 2018-08-25

## 2018-08-25 RX ORDER — ACETAMINOPHEN 500 MG
650 TABLET ORAL EVERY 6 HOURS
Qty: 0 | Refills: 0 | Status: DISCONTINUED | OUTPATIENT
Start: 2018-08-25 | End: 2018-08-25

## 2018-08-25 RX ORDER — ACETAMINOPHEN 500 MG
650 TABLET ORAL EVERY 6 HOURS
Qty: 0 | Refills: 0 | Status: DISCONTINUED | OUTPATIENT
Start: 2018-08-25 | End: 2018-09-06

## 2018-08-25 RX ORDER — MAGNESIUM SULFATE 500 MG/ML
1 VIAL (ML) INJECTION ONCE
Qty: 0 | Refills: 0 | Status: COMPLETED | OUTPATIENT
Start: 2018-08-25 | End: 2018-08-25

## 2018-08-25 RX ADMIN — PANTOPRAZOLE SODIUM 10 MG/HR: 20 TABLET, DELAYED RELEASE ORAL at 06:56

## 2018-08-25 RX ADMIN — SODIUM CHLORIDE 75 MILLILITER(S): 9 INJECTION, SOLUTION INTRAVENOUS at 06:56

## 2018-08-25 RX ADMIN — Medication 100 GRAM(S): at 10:04

## 2018-08-25 RX ADMIN — DONEPEZIL HYDROCHLORIDE 10 MILLIGRAM(S): 10 TABLET, FILM COATED ORAL at 10:30

## 2018-08-25 RX ADMIN — PANTOPRAZOLE SODIUM 10 MG/HR: 20 TABLET, DELAYED RELEASE ORAL at 10:04

## 2018-08-25 RX ADMIN — SODIUM CHLORIDE 75 MILLILITER(S): 9 INJECTION, SOLUTION INTRAVENOUS at 10:51

## 2018-08-25 NOTE — DISCHARGE NOTE ADULT - MEDICATION SUMMARY - MEDICATIONS TO STOP TAKING
I will STOP taking the medications listed below when I get home from the hospital:    Zocor 10 mg oral tablet  -- 1 tab(s) by mouth once a day (at bedtime) I will STOP taking the medications listed below when I get home from the hospital:    quinapril 40 mg oral tablet  -- 1 tab(s) by mouth once a day    VESIcare 10 mg oral tablet  -- 1 tab(s) by mouth once a day

## 2018-08-25 NOTE — DISCHARGE NOTE ADULT - CONDITIONS AT DISCHARGE
stable documents sent with Pt Rt saline Lock removed no sign of infection noted  Audrey received copy of tests that was done for Pt stable documents sent with Pt Rt saline Lock removed no sign of infection noted  Grandson received copy of tests that was done for Pt VS as follow Temp 97.9 HR 92 /58 Resp 16 Sat 98% R/A

## 2018-08-25 NOTE — DISCHARGE NOTE ADULT - CARE PLAN
Principal Discharge DX:	GIB (gastrointestinal bleeding)  Goal:	Prevention of future recurrences  Assessment and plan of treatment:	You were diagnosed with  Secondary Diagnosis:	Dementia  Goal:	Continue with current medication regimen  Assessment and plan of treatment:	You were diagnosed with dementia. Your medication regimen for the condition has not changed. Be compliant with the medication regimen. Follow up with your PCP within 2 weeks of discharge.  Secondary Diagnosis:	Diabetes mellitus  Goal:	HbA1C < 5.5  Assessment and plan of treatment:	You were diagnosed with diabetes. Your medication regimen for the condition has not changed. Be compliant with the medication regimen. Follow up with your PCP within 2 weeks of discharge.  Secondary Diagnosis:	Hypertension, unspecified type  Goal:	SBP < 140  Assessment and plan of treatment:	You were diagnosed with HTN. Your medication regimen for the condition has not changed. Be compliant with the medication regimen. Be conservative in consuming liquid and food high in salt. Follow up with your PCP within 2 weeks of discharge. Principal Discharge DX:	GIB (gastrointestinal bleeding)  Goal:	Resolution of medical condition  Assessment and plan of treatment:	You were diagnosed with gastrointestinal bleeding. You came in with Hgb 5.1, FOBT positive, with black hard stools. You received a transfusion for low hgb level, GI bleeding symptoms subsided throughout your admission, and your H/H stabilized by the time of admission. Follow up with your PCP within 1 week of discharge. If you notice any bleeding mixed in with vomitus, stool, or urine, present to nearby ER immediately. Follow up with your PCP within 1 weeks of discharge.  Secondary Diagnosis:	Dementia  Goal:	Continue with current medication regimen  Assessment and plan of treatment:	You were diagnosed with dementia. Your medication regimen for the condition has not changed. Be compliant with the medication regimen. Follow up with your PCP within 1 weeks of discharge.  Secondary Diagnosis:	Diabetes mellitus  Goal:	HbA1C < 5.5  Assessment and plan of treatment:	You were diagnosed with diabetes. Your medication regimen for the condition has not changed. Be compliant with the medication regimen. Follow up with your PCP within 1 weeks of discharge.  Secondary Diagnosis:	Hypertension, unspecified type  Goal:	SBP < 140  Assessment and plan of treatment:	You were diagnosed with HTN. Your medication regimen for the condition has not changed. Be compliant with the medication regimen. Be conservative in consuming liquid and food high in salt. Follow up with your PCP within 1 weeks of discharge. Principal Discharge DX:	GIB (gastrointestinal bleeding)  Goal:	Resolution of medical condition  Assessment and plan of treatment:	You were diagnosed with gastrointestinal bleeding. You came in with Hgb 5.1, FOBT positive, with black hard stools. You received a transfusion for low hgb level, GI bleeding symptoms subsided throughout your admission, and your H/H stabilized by the time of admission. Follow up with your PCP within 1 week of discharge. We found that you had duodenal ulcer and an ulcer in part of cecum( intestine) which was clipped. We also found that you have partial obstruction of stomach, hence recommend small feeds at regular intervals. Follow up with your PCP within 1 weeks of discharge.  Secondary Diagnosis:	Dementia  Goal:	Continue with current medication regimen  Assessment and plan of treatment:	You were diagnosed with dementia. Your medication regimen for the condition has not changed. Be compliant with the medication regimen. Follow up with your PCP within 1 weeks of discharge.  Secondary Diagnosis:	Diabetes mellitus  Goal:	HbA1C < 5.5  Assessment and plan of treatment:	You were diagnosed with diabetes. Your medication regimen for the condition has not changed. Be compliant with the medication regimen. Follow up with your PCP within 1 weeks of discharge.  Secondary Diagnosis:	Hypertension, unspecified type  Goal:	SBP < 140  Assessment and plan of treatment:	You were diagnosed with HTN. Your medication regimen for the condition has not changed. Be compliant with the medication regimen. Be conservative in consuming liquid and food high in salt. Follow up with your PCP within 1 weeks of discharge.

## 2018-08-25 NOTE — PROGRESS NOTE ADULT - SUBJECTIVE AND OBJECTIVE BOX
HPI:  89yo female, from NH, AAOx1, with PMHx Dementia, HTN, DM, HLD, Iron deficiency anemia, Urinary Incontinence, sent from NH for Hb: 5. Patient is very demented and unable to give further details. Resting comfortably in bed, no abdominal pain, N/V/ hematochezia or hematemesis. Grandson at bedside, refers that patient has been having dark bowel movements for a couple of months back, before being admitted to NH for rehab. Patient was recently admitted on  for syncopal episode, found to be anemic (Hb: 7.1), FOBT (+), underwent EGD which was negative for active bleeding, found to have bazoar in stomach. 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 GOC discussed at length with grandson: Yousuf. Patient remains FULL CODE. He wants everything to be done for the patient, "until she tells me to stop". (23 Aug 2018 17:03)      Patient is a 89y old  Female who presents with a chief complaint of anemia (23 Aug 2018 17:03)      INTERVAL HPI/OVERNIGHT EVENTS:  T(C): 37.1 (18 @ 13:36), Max: 37.2 (18 @ 19:41)  HR: 105 (-18 @ 13:36) (90 - 109)  BP: 133/70 (-18 @ 13:36) (127/67 - 143/69)  RR: 17 (18 @ 13:36) (16 - 18)  SpO2: 100% (18 @ 13:36) (97% - 100%)  Wt(kg): --  I&O's Summary      REVIEW OF SYSTEMS: denies fever, chills, SOB, palpitations, chest pain, abdominal pain, nausea, vomitting, diarrhea, constipation, dizziness    MEDICATIONS  (STANDING):  dextrose 5% + sodium chloride 0.9%. 1000 milliLiter(s) (75 mL/Hr) IV Continuous <Continuous>  dextrose 5%. 1000 milliLiter(s) (50 mL/Hr) IV Continuous <Continuous>  dextrose 50% Injectable 12.5 Gram(s) IV Push once  dextrose 50% Injectable 25 Gram(s) IV Push once  dextrose 50% Injectable 25 Gram(s) IV Push once  donepezil 10 milliGRAM(s) Oral at bedtime  furosemide   Injectable 20 milliGRAM(s) IV Push once  pantoprazole Infusion 8 mG/Hr (10 mL/Hr) IV Continuous <Continuous>    MEDICATIONS  (PRN):  acetaminophen    Suspension. 650 milliGRAM(s) Oral every 6 hours PRN Moderate Pain (4 - 6)  dextrose 40% Gel 15 Gram(s) Oral once PRN Blood Glucose LESS THAN 70 milliGRAM(s)/deciliter  glucagon  Injectable 1 milliGRAM(s) IntraMuscular once PRN Glucose LESS THAN 70 milligrams/deciliter      PHYSICAL EXAM:  GENERAL: NAD, well-groomed, well-developed  HEAD:  Atraumatic, Normocephalic  EYES: EOMI, PERRLA, conjunctiva and sclera clear  ENMT: No tonsillar erythema, exudates, or enlargement; Moist mucous membranes, Good dentition, No lesions  NECK: Supple, No JVD, Normal thyroid  NERVOUS SYSTEM:  Alert & Oriented X3, Good concentration; Motor Strength 5/5 B/L upper and lower extremities; DTRs 2+ intact and symmetric  CHEST/LUNG: Clear to percussion bilaterally; No rales, rhonchi, wheezing, or rubs  HEART: Regular rate and rhythm; No murmurs, rubs, or gallops  ABDOMEN: Soft, Nontender, Nondistended; Bowel sounds present  EXTREMITIES:  2+ Peripheral Pulses, No clubbing, cyanosis, or edema  LYMPH: No lymphadenopathy noted  SKIN: No rashes or lesions  LABS:                        8.9    15.6  )-----------( 262      ( 25 Aug 2018 06:11 )             26.4     -    141  |  106  |  40<H>  ----------------------------<  104<H>  4.1   |  25  |  1.24    Ca    9.2      25 Aug 2018 06:11  Phos  3.7     -  Mg     1.5             Urinalysis Basic - ( 23 Aug 2018 14:56 )    Color: Yellow / Appearance: Clear / S.010 / pH: x  Gluc: x / Ketone: Negative  / Bili: Negative / Urobili: Negative   Blood: x / Protein: 15 / Nitrite: Negative   Leuk Esterase: Trace / RBC: 0-2 /HPF / WBC 3-5 /HPF   Sq Epi: x / Non Sq Epi: Few /HPF / Bacteria: Trace /HPF      CAPILLARY BLOOD GLUCOSE      POCT Blood Glucose.: 144 mg/dL (25 Aug 2018 11:44)  POCT Blood Glucose.: 111 mg/dL (24 Aug 2018 23:54)        Urinalysis Basic - ( 23 Aug 2018 14:56 )    Color: Yellow / Appearance: Clear / S.010 / pH: x  Gluc: x / Ketone: Negative  / Bili: Negative / Urobili: Negative   Blood: x / Protein: 15 / Nitrite: Negative   Leuk Esterase: Trace / RBC: 0-2 /HPF / WBC 3-5 /HPF   Sq Epi: x / Non Sq Epi: Few /HPF / Bacteria: Trace /HPF

## 2018-08-25 NOTE — PROGRESS NOTE ADULT - SUBJECTIVE AND OBJECTIVE BOX
HPI:  89yo female, from NH, AAOx1, with PMHx Dementia, HTN, DM, HLD, Iron deficiency anemia, Urinary Incontinence, sent from NH for Hb: 5. Patient is very demented and unable to give further details. Resting comfortably in bed, no abdominal pain, N/V/ hematochezia or hematemesis. Grandson at bedside, refers that patient has been having dark bowel movements for a couple of months back, before being admitted to NH for rehab. Patient was recently admitted on  for syncopal episode, found to be anemic (Hb: 7.1), FOBT (+), underwent EGD which was negative for active bleeding, found to have bazoar in stomach. 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 Grandson at bedside, refers that patient has been having dark bowel movements for a couple of months back, before being admitted to NH for rehab. GOC discussed at length with grandson: Yousuf. Patient remains FULL CODE. He wants everything to be done for the patient, "until she tells me to stop". (23 Aug 2018 17:03)    SUBJECTIVE: Grandson at bedside who expressed concern regarding drop in hemoglobin. Patient lyng down comfortably. No acute complaints.    PAST MEDICAL & SURGICAL HISTORY:  Dementia  Hyperchylomicronemia  Hypertension, unspecified type  Type 2 diabetes mellitus without complication, without long-term current use of insulin  No significant past surgical history      ALLERGIES: No Known Allergies      MEDS:  acetaminophen  IVPB. 1000 milliGRAM(s) IV Intermittent once  dextrose 40% Gel 15 Gram(s) Oral once PRN  dextrose 5% + sodium chloride 0.9%. 1000 milliLiter(s) IV Continuous <Continuous>  dextrose 5%. 1000 milliLiter(s) IV Continuous <Continuous>  dextrose 50% Injectable 12.5 Gram(s) IV Push once  dextrose 50% Injectable 25 Gram(s) IV Push once  dextrose 50% Injectable 25 Gram(s) IV Push once  donepezil 10 milliGRAM(s) Oral at bedtime  glucagon  Injectable 1 milliGRAM(s) IntraMuscular once PRN  pantoprazole Infusion 8 mG/Hr IV Continuous <Continuous>      SOCIAL HISTORY:  Smoker:  YES / NO        PACK YEARS:                         WHEN QUIT?  Occupation:  Live with:    FAMILY HISTORY:  Family history of hyperlipidemia  Family history of diabetes mellitus (Father, Mother)      VITALS:  Vital Signs Last 24 Hrs  T(C): 36.7 (24 Aug 2018 05:37), Max: 37.1 (23 Aug 2018 22:32)  T(F): 98 (24 Aug 2018 05:37), Max: 98.8 (23 Aug 2018 22:32)  HR: 70 (24 Aug 2018 05:37) (70 - 127)  BP: 110/55 (24 Aug 2018 05:37) (93/65 - 146/52)  BP(mean): --  RR: 17 (24 Aug 2018 05:37) (16 - 18)  SpO2: 100% (24 Aug 2018 05:37) (97% - 100%)      GENERAL: NAD  HEAD:  Atraumatic, Normocephalic  EYES: EOMI, PERRLA  ENMT: Moist mucous membranes  NECK: Supple  NERVOUS SYSTEM:  Alert & Oriented X3  CHEST/LUNG: Clear to auscultation bilaterally  HEART: Regular rate and rhythm  ABDOMEN: Soft, Nontender, Nondistended  EXTREMITIES:  2+ Peripheral Pulses      LABS/DIAGNOSTIC TESTS:                        7.2    17.0  )-----------( 251      ( 24 Aug 2018 09:11 )             23.2     WBC Count: 17.0 K/uL ( @ 09:11)  WBC Count: 15.6 K/uL ( @ 07:45)  WBC Count: 15.6 K/uL ( @ 01:08)  WBC Count: 16.0 K/uL ( @ 14:38)        141  |  108  |  59<H>  ----------------------------<  120<H>  4.5   |  23  |  1.42<H>    Ca    9.0      24 Aug 2018 07:45  Phos  3.7       Mg     1.7         TPro  6.8  /  Alb  2.6<L>  /  TBili  0.1<L>  /  DBili  x   /  AST  18  /  ALT  24  /  AlkPhos  66        Urinalysis Basic - ( 23 Aug 2018 14:56 )    Color: Yellow / Appearance: Clear / S.010 / pH: x  Gluc: x / Ketone: Negative  / Bili: Negative / Urobili: Negative   Blood: x / Protein: 15 / Nitrite: Negative   Leuk Esterase: Trace / RBC: 0-2 /HPF / WBC 3-5 /HPF   Sq Epi: x / Non Sq Epi: Few /HPF / Bacteria: Trace /HPF        LIVER FUNCTIONS - ( 23 Aug 2018 14:38 )  Alb: 2.6 g/dL / Pro: 6.8 g/dL / ALK PHOS: 66 U/L / ALT: 24 U/L DA / AST: 18 U/L / GGT: x             PT/INR - ( 23 Aug 2018 14:38 )   PT: 13.2 sec;   INR: 1.21 ratio         PTT - ( 23 Aug 2018 14:38 )  PTT:26.7 sec    LACTATE:    ABG -     CULTURES:   .Urine Catheterized   @ 23:24   No growth  --  --          RADIOLOGY:        ROS  [  ] UNABLE TO ELICIT

## 2018-08-25 NOTE — DISCHARGE NOTE ADULT - PATIENT PORTAL LINK FT
You can access the [facility] Patient Portal, offered by [facility], by registering with the following website: [email or web address]

## 2018-08-25 NOTE — DISCHARGE NOTE ADULT - MEDICATION SUMMARY - MEDICATIONS TO CHANGE
I will SWITCH the dose or number of times a day I take the medications listed below when I get home from the hospital:  None I will SWITCH the dose or number of times a day I take the medications listed below when I get home from the hospital:    Aricept 10 mg oral tablet  -- 1 tab(s) by mouth once a day (at bedtime)    Zocor 10 mg oral tablet  -- 1 tab(s) by mouth once a day (at bedtime)

## 2018-08-25 NOTE — DISCHARGE NOTE ADULT - HOSPITAL COURSE
Pt is 89yo female, from NH, AAOx1, with PMHx Dementia, HTN, DM, HLD, Iron deficiency anemia, Urinary Incontinence, sent from NH for Hb: 5. Patient was very demented and unable to give further details. Resting comfortably in bed, no abdominal pain, N/V/ hematochezia or hematemesis. Patient was been having dark bowel movements for a couple of months back, before being admitted to NH for rehab. Patient was recently admitted on July 30th for syncopal episode, found to be anemic (Hb: 7.1), FOBT (+), underwent EGD which was negative for active bleeding, found to have bazoar in stomach. 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 Pt has been given 4  PRBC until now. Ms. Rosenberg is a 89yo female, from NH, [facility], with PMHx Dementia, HTN, DM, HLD, Iron deficiency anemia, Urinary Incontinence, sent from NH for Hb: 5. Pt's baseline Hb measured on 8/26 was 9.5.    Patient presented with suspected GIB (gastrointestinal bleeding). EGD done in August 2018 at outside hospital showed no signs of active GI bleed. Colonoscopy performed 8/27 during this admission showed duodenal and cecal ulcers without active bleeding. No hematochezia, melena, hematemesis noted throughout her admission.    However, H/H fell gradually to 7.7 by 8/28 morning. In the same day afternoon, Hgb dropped to below 7.0. She received 1 PRBC overnight, Hgb stabilized to 8.8 next morning and remained in the range for the rest of her admission.     In the morning of 8/30, patient developed AMS and became less verbal, less responsive. CTH was negative for acute pathology. Source of acute encephalopathy was thought to be left upper extremity cellulitis. With altering mental status, low grade fever, and elevating WBC, patient was started on Unasyn. Palliative [place] NP was notified and DNR/DNI was instated by HCP. Ever since then, WBC trended down, 9.6 WNL on the day of discharge. Abx d/c on the day of discharge as Lt arm swelling, warmth, redness, and pain subsided.     For dementia, she was c/w Aricept home dose.    For diabetes mellitus, patient was c/w HISS.    For hypertension, medicines were held off in setting of possible GI bleed, Vitals were monitored closely and BP remained control without medications. Ms. Rosenberg is a 89yo female, from NH, [facility], with PMHx Dementia, HTN, DM, HLD, Iron deficiency anemia, Urinary Incontinence, sent from NH for Hb: 5. Pt's baseline Hb measured on 8/26 was 9.5. Patient presented with suspected GIB (gastrointestinal bleeding). EGD done in August 2018 at outside hospital showed no signs of active GI bleed. Colonoscopy performed 8/27 during this admission showed duodenal ulcer ( biopsy s/o duodenitis) with partial gastric outlet obstruction and cecal ulcers s/p clipping. No hematochezia, melena, hematemesis noted throughout her admission. However, H/H fell gradually to 7.7 by 8/28 morning. In the same day afternoon, Hgb dropped to below 7.0. She received 1 PRBC overnight, Hgb stabilized to 8.8 next morning and remained in the range for the rest of her admission. In the morning of 8/30, patient developed AMS and became less verbal, less responsive. CTH was negative for acute pathology. Source of acute encephalopathy was thought to be left upper extremity cellulitis. With altering mental status, low grade fever, and elevating WBC, patient was started on Unasyn. Palliative [place] NP was notified and DNR/DNI was instated by HCP. Ever since then, WBC trended down, 9.6 WNL on the day of discharge. Abx d/c on the day of discharge as Lt arm swelling, warmth, redness, and pain subsided. For dementia, she was c/w Aricept home dose. For diabetes mellitus, patient was c/w HISS. For hypertension, medicines were held off in setting of possible GI bleed, Vitals were monitored closely and BP remained control without medications.  Patient is stable for discharge per attending and is advised to follow up with PCP as outpatient   Please refer to patient's complete medical chart with documents for a full hospital course, for this is only a brief summary. Ms. Rosenberg is a 89yo female, from NH, [facility], with PMHx Dementia, HTN, DM, HLD, Iron deficiency anemia, Urinary Incontinence, sent from NH for Hb: 5. Pt's baseline Hb measured on 8/26 was 9.5. Patient presented with suspected GIB (gastrointestinal bleeding). EGD done in August 2018 at outside hospital showed no signs of active GI bleed. Colonoscopy performed 8/27 during this admission showed duodenal ulcer ( biopsy s/o duodenitis) with partial gastric outlet obstruction and cecal ulcers s/p clipping. No hematochezia, melena, hematemesis noted throughout her admission. However, H/H fell gradually to 7.7 by 8/28 morning. In the same day afternoon, Hgb dropped to below 7.0. She received 1 PRBC overnight, Hgb stabilized to 8.8 next morning and remained in the range for the rest of her admission. In the morning of 8/30, patient developed AMS and became less verbal, less responsive. CTH was negative for acute pathology. Source of acute encephalopathy was thought to be left upper extremity cellulitis. With altering mental status, low grade fever, and elevating WBC, patient was started on Unasyn. Palliative [place] NP was notified and DNR/DNI was instated by HCP. Ever since then, WBC trended down, 9.6 WNL on the day of discharge. Abx d/c on the day of discharge as Lt arm swelling, warmth, redness, and pain subsided. For dementia, she was c/w Aricept home dose. For diabetes mellitus, patient was c/w HISS. For hypertension, medicines were held off in setting of possible GI bleed, Vitals were monitored closely and BP remained control without medications. At this time patient is instated DNR/DNI. Patient's HCP however does not want feeding tube or PEG to be placed on the patient even though he understands that patient is not meeting her nutritional requirement. Patient was put on clear liquid diet for aspiration risk, but he wants to patient to receive comfort feeding via pureed diet. Pureed diet started 9/5, on the day of discharge(, and was tolerated by the patient).  Patient is stable for discharge per attending and is advised to follow up with PCP as outpatient  Please refer to patient's complete medical chart with documents for a full hospital course, for this is only a brief summary.

## 2018-08-25 NOTE — DISCHARGE NOTE ADULT - MEDICATION SUMMARY - MEDICATIONS TO TAKE
I will START or STAY ON the medications listed below when I get home from the hospital:    acetaminophen 325 mg oral tablet  -- 2 tab(s) by mouth every 6 hours, As needed, Mild Pain (1 - 3)  -- Indication: For Pain    quinapril 40 mg oral tablet  -- 1 tab(s) by mouth once a day  -- Indication: For Hypertension, unspecified type    gabapentin 300 mg oral capsule  -- 1 cap(s) by mouth once a day  -- Indication: For Pain    metFORMIN 500 mg oral tablet  -- 1 tab(s) by mouth once a day  -- Indication: For Diabetes mellitus    metoclopramide 10 mg oral tablet  -- 1 tab(s) by mouth 3 times a day  -- Indication: For Palliative care encounter    simvastatin 10 mg oral tablet  -- 1 tab(s) by mouth once a day (at bedtime)  -- Indication: For Need for prophylactic measure    Aricept 10 mg oral tablet  -- 1 tab(s) by mouth once a day (at bedtime)  -- Indication: For Dementia    ammonium lactate 12% topical cream  -- Apply on skin to affected area 2 times a day  -- Indication: For Topical agent    furosemide 100 mg/100 mL-0.9% intravenous solution  -- 20 milliliter(s) intravenous once  -- Indication: For Hypertension, unspecified type    ferrous sulfate 300 mg (60 mg elemental iron) oral tablet  -- 1 tab(s) by mouth once a day  -- Indication: For Supplement    pantoprazole 40 mg oral delayed release tablet  -- 1 tab(s) by mouth once a day MDD:2 tab   -- Indication: For Need for prophylactic measure    VESIcare 10 mg oral tablet  -- 1 tab(s) by mouth once a day  -- Indication: For Urinary antispasmodics    oxybutynin 10 mg/24 hr oral tablet, extended release  -- 1 tab(s) by mouth once a day  -- Indication: For Urinary antispasmodics I will START or STAY ON the medications listed below when I get home from the hospital:    acetaminophen 325 mg oral tablet  -- 2 tab(s) by mouth every 6 hours, As needed, Mild Pain (1 - 3)  -- Indication: For Pain    gabapentin 300 mg oral capsule  -- 1 cap(s) by mouth once a day  -- Indication: For Pain    metFORMIN 500 mg oral tablet  -- 1 tab(s) by mouth once a day  -- Indication: For Diabetes mellitus    metoclopramide 10 mg oral tablet  -- 1 tab(s) by mouth 3 times a day  -- Indication: For Palliative care encounter    simvastatin 10 mg oral tablet  -- 1 tab(s) by mouth once a day (at bedtime)  -- Indication: For Need for prophylactic measure    Aricept 10 mg oral tablet  -- 1 tab(s) by mouth once a day (at bedtime)  -- Indication: For Dementia    ammonium lactate 12% topical cream  -- Apply on skin to affected area 2 times a day  -- Indication: For Topical agent    ferrous sulfate 300 mg (60 mg elemental iron) oral tablet  -- 1 tab(s) by mouth once a day  -- Indication: For Supplement    pantoprazole 40 mg oral delayed release tablet  -- 1 tab(s) by mouth once a day MDD:2 tab   -- Indication: For Need for prophylactic measure    oxybutynin 10 mg/24 hr oral tablet, extended release  -- 1 tab(s) by mouth once a day  -- Indication: For Urinary antispasmodics

## 2018-08-25 NOTE — DISCHARGE NOTE ADULT - PLAN OF CARE
You were diagnosed with diabetes. Your medication regimen for the condition has not changed. Be compliant with the medication regimen. Follow up with your PCP within 2 weeks of discharge. SBP < 140 You were diagnosed with HTN. Your medication regimen for the condition has not changed. Be compliant with the medication regimen. Be conservative in consuming liquid and food high in salt. Follow up with your PCP within 2 weeks of discharge. Prevention of future recurrences You were diagnosed with Continue with current medication regimen You were diagnosed with dementia. Your medication regimen for the condition has not changed. Be compliant with the medication regimen. Follow up with your PCP within 2 weeks of discharge. HbA1C < 5.5 Resolution of medical condition You were diagnosed with gastrointestinal bleeding. You came in with Hgb 5.1, FOBT positive, with black hard stools. You received a transfusion for low hgb level, GI bleeding symptoms subsided throughout your admission, and your H/H stabilized by the time of admission. Follow up with your PCP within 1 week of discharge. If you notice any bleeding mixed in with vomitus, stool, or urine, present to nearby ER immediately. Follow up with your PCP within 1 weeks of discharge. You were diagnosed with dementia. Your medication regimen for the condition has not changed. Be compliant with the medication regimen. Follow up with your PCP within 1 weeks of discharge. You were diagnosed with diabetes. Your medication regimen for the condition has not changed. Be compliant with the medication regimen. Follow up with your PCP within 1 weeks of discharge. You were diagnosed with HTN. Your medication regimen for the condition has not changed. Be compliant with the medication regimen. Be conservative in consuming liquid and food high in salt. Follow up with your PCP within 1 weeks of discharge. You were diagnosed with gastrointestinal bleeding. You came in with Hgb 5.1, FOBT positive, with black hard stools. You received a transfusion for low hgb level, GI bleeding symptoms subsided throughout your admission, and your H/H stabilized by the time of admission. Follow up with your PCP within 1 week of discharge. We found that you had duodenal ulcer and an ulcer in part of cecum( intestine) which was clipped. We also found that you have partial obstruction of stomach, hence recommend small feeds at regular intervals. Follow up with your PCP within 1 weeks of discharge.

## 2018-08-26 LAB
ANION GAP SERPL CALC-SCNC: 8 MMOL/L — SIGNIFICANT CHANGE UP (ref 5–17)
BUN SERPL-MCNC: 22 MG/DL — HIGH (ref 7–18)
CALCIUM SERPL-MCNC: 8.4 MG/DL — SIGNIFICANT CHANGE UP (ref 8.4–10.5)
CHLORIDE SERPL-SCNC: 110 MMOL/L — HIGH (ref 96–108)
CO2 SERPL-SCNC: 23 MMOL/L — SIGNIFICANT CHANGE UP (ref 22–31)
CREAT SERPL-MCNC: 0.95 MG/DL — SIGNIFICANT CHANGE UP (ref 0.5–1.3)
GLUCOSE BLDC GLUCOMTR-MCNC: 130 MG/DL — HIGH (ref 70–99)
GLUCOSE BLDC GLUCOMTR-MCNC: 132 MG/DL — HIGH (ref 70–99)
GLUCOSE BLDC GLUCOMTR-MCNC: 145 MG/DL — HIGH (ref 70–99)
GLUCOSE SERPL-MCNC: 144 MG/DL — HIGH (ref 70–99)
HCT VFR BLD CALC: 25.2 % — LOW (ref 34.5–45)
HCT VFR BLD CALC: 29 % — LOW (ref 34.5–45)
HGB BLD-MCNC: 8.3 G/DL — LOW (ref 11.5–15.5)
HGB BLD-MCNC: 9.5 G/DL — LOW (ref 11.5–15.5)
MCHC RBC-ENTMCNC: 27.2 PG — SIGNIFICANT CHANGE UP (ref 27–34)
MCHC RBC-ENTMCNC: 27.5 PG — SIGNIFICANT CHANGE UP (ref 27–34)
MCHC RBC-ENTMCNC: 32.6 GM/DL — SIGNIFICANT CHANGE UP (ref 32–36)
MCHC RBC-ENTMCNC: 32.8 GM/DL — SIGNIFICANT CHANGE UP (ref 32–36)
MCV RBC AUTO: 83.5 FL — SIGNIFICANT CHANGE UP (ref 80–100)
MCV RBC AUTO: 83.8 FL — SIGNIFICANT CHANGE UP (ref 80–100)
PLATELET # BLD AUTO: 235 K/UL — SIGNIFICANT CHANGE UP (ref 150–400)
PLATELET # BLD AUTO: 300 K/UL — SIGNIFICANT CHANGE UP (ref 150–400)
POTASSIUM SERPL-MCNC: 4 MMOL/L — SIGNIFICANT CHANGE UP (ref 3.5–5.3)
POTASSIUM SERPL-SCNC: 4 MMOL/L — SIGNIFICANT CHANGE UP (ref 3.5–5.3)
RBC # BLD: 3 M/UL — LOW (ref 3.8–5.2)
RBC # BLD: 3.47 M/UL — LOW (ref 3.8–5.2)
RBC # FLD: 16.9 % — HIGH (ref 10.3–14.5)
RBC # FLD: 17.6 % — HIGH (ref 10.3–14.5)
SODIUM SERPL-SCNC: 141 MMOL/L — SIGNIFICANT CHANGE UP (ref 135–145)
T PALLIDUM AB TITR SER: NEGATIVE — SIGNIFICANT CHANGE UP
TSH SERPL-MCNC: 0.52 UU/ML — SIGNIFICANT CHANGE UP (ref 0.34–4.82)
VIT B12 SERPL-MCNC: 938 PG/ML — SIGNIFICANT CHANGE UP (ref 232–1245)
WBC # BLD: 13.8 K/UL — HIGH (ref 3.8–10.5)
WBC # BLD: 15 K/UL — HIGH (ref 3.8–10.5)
WBC # FLD AUTO: 13.8 K/UL — HIGH (ref 3.8–10.5)
WBC # FLD AUTO: 15 K/UL — HIGH (ref 3.8–10.5)

## 2018-08-26 PROCEDURE — 71045 X-RAY EXAM CHEST 1 VIEW: CPT | Mod: 26

## 2018-08-26 RX ORDER — SOD SULF/SODIUM/NAHCO3/KCL/PEG
4000 SOLUTION, RECONSTITUTED, ORAL ORAL ONCE
Qty: 0 | Refills: 0 | Status: COMPLETED | OUTPATIENT
Start: 2018-08-26 | End: 2018-08-26

## 2018-08-26 RX ADMIN — PANTOPRAZOLE SODIUM 10 MG/HR: 20 TABLET, DELAYED RELEASE ORAL at 20:00

## 2018-08-26 RX ADMIN — SODIUM CHLORIDE 75 MILLILITER(S): 9 INJECTION, SOLUTION INTRAVENOUS at 00:44

## 2018-08-26 RX ADMIN — PANTOPRAZOLE SODIUM 10 MG/HR: 20 TABLET, DELAYED RELEASE ORAL at 08:04

## 2018-08-26 RX ADMIN — Medication 4000 MILLILITER(S): at 11:30

## 2018-08-26 RX ADMIN — SODIUM CHLORIDE 75 MILLILITER(S): 9 INJECTION, SOLUTION INTRAVENOUS at 08:04

## 2018-08-26 RX ADMIN — PANTOPRAZOLE SODIUM 10 MG/HR: 20 TABLET, DELAYED RELEASE ORAL at 00:43

## 2018-08-26 RX ADMIN — SODIUM CHLORIDE 75 MILLILITER(S): 9 INJECTION, SOLUTION INTRAVENOUS at 20:00

## 2018-08-26 NOTE — PROGRESS NOTE ADULT - SUBJECTIVE AND OBJECTIVE BOX
PGY 1 Note discussed with supervising resident and primary attending    Patient is a 89y old  Female who presents with a chief complaint of anemia (25 Aug 2018 16:47)      INTERVAL HPI/OVERNIGHT EVENTS: offers no new complaints;  MEDICATIONS  (STANDING):  dextrose 5% + sodium chloride 0.9%. 1000 milliLiter(s) (75 mL/Hr) IV Continuous <Continuous>  donepezil 10 milliGRAM(s) Oral at bedtime  furosemide   Injectable 20 milliGRAM(s) IV Push once  pantoprazole Infusion 8 mG/Hr (10 mL/Hr) IV Continuous <Continuous>  polyethylene glycol/electrolyte Solution. 4000 milliLiter(s) Oral once    MEDICATIONS  (PRN):  acetaminophen    Suspension. 650 milliGRAM(s) Oral every 6 hours PRN Moderate Pain (4 - 6)  glucagon  Injectable 1 milliGRAM(s) IntraMuscular once PRN Glucose LESS THAN 70 milligrams/deciliter      __________________________________________________  REVIEW OF SYSTEMS:    CONSTITUTIONAL: No fever,   EYES: no acute visual disturbances  NECK: No pain or stiffness  RESPIRATORY: No cough; No shortness of breath  CARDIOVASCULAR: No chest pain, no palpitations  GASTROINTESTINAL: No pain. No nausea or vomiting; No diarrhea   NEUROLOGICAL: No headache or numbness, no tremors  MUSCULOSKELETAL: No joint pain, no muscle pain  GENITOURINARY: no dysuria, no frequency, no hesitancy  PSYCHIATRY: no depression , no anxiety  ALL OTHER  ROS negative        Vital Signs Last 24 Hrs  T(C): 36.6 (26 Aug 2018 06:20), Max: 36.6 (26 Aug 2018 06:20)  T(F): 97.9 (26 Aug 2018 06:20), Max: 97.9 (26 Aug 2018 06:20)  HR: 100 (26 Aug 2018 06:20) (93 - 100)  BP: 133/76 (26 Aug 2018 06:20) (133/76 - 138/64)  BP(mean): --  RR: 17 (26 Aug 2018 06:20) (17 - 17)  SpO2: 100% (26 Aug 2018 06:20) (100% - 100%)    ________________________________________________  PHYSICAL EXAM:  GENERAL: NAD  HEENT: Normocephalic;  conjunctivae and sclerae clear; moist mucous membranes;   NECK : supple  CHEST/LUNG: Clear to auscultation bilaterally with good air entry   HEART: S1 S2  regular; no murmurs, gallops or rubs  ABDOMEN: Soft, Nontender, Nondistended; Bowel sounds present  EXTREMITIES: no cyanosis; no edema; no calf tenderness  SKIN: warm and dry; no rash  NERVOUS SYSTEM:  Awake and alert; Oriented  to place, person and time ; no new deficits    _________________________________________________  LABS:                        9.5    15.0  )-----------( 235      ( 26 Aug 2018 12:22 )             29.0     08-26    141  |  110<H>  |  22<H>  ----------------------------<  144<H>  4.0   |  23  |  0.95    Ca    8.4      26 Aug 2018 08:05  Mg     1.5     08-25          CAPILLARY BLOOD GLUCOSE      POCT Blood Glucose.: 130 mg/dL (26 Aug 2018 11:38)  POCT Blood Glucose.: 145 mg/dL (26 Aug 2018 08:06)  POCT Blood Glucose.: 155 mg/dL (25 Aug 2018 18:06)        RADIOLOGY & ADDITIONAL TESTS:    Imaging Personally Reviewed:  YES/NO    Consultant(s) Notes Reviewed:   YES/ No    Care Discussed with Consultants :     Plan of care was discussed with patient and /or primary care giver; all questions and concerns were addressed and care was aligned with patient's wishes.

## 2018-08-26 NOTE — PROGRESS NOTE ADULT - PROBLEM SELECTOR PLAN 1
Patient not having active bowel movements at the moment, not vomiting, asymptomatic.   EGD August 2018: no signs of active GI bleed  Keep Hb >8, follow CBC q6hrs for now. Recent Hb on 8/26 is 9.5.  Planned for colonoscopy and EGD  on Monday 8/27.  NPO over midnight.  Will put NG tube for Gol[place].

## 2018-08-26 NOTE — DIETITIAN INITIAL EVALUATION ADULT. - PROBLEM SELECTOR PLAN 1
Patient p/w Hb: 5.1, FOBT positive w/ black hard stools. Hx iron deficiency anemia with baseline Hb: 9-10. Recently discharged on August 9th with Hb: 9.7  Patient not having active bowel movements at the moment, not vomiting, asymptomatic. Very demented, unable to give further details.  Orthostatics negative  EGD August 2018: no signs of active GI bleed  Will transfuse 2U PRBC --> check CBC post transfusion  Keep Hb >8, follow CBC q6hrs for now  Will keep NPO for now, c/w IVF  c/w Protonix gtt  Patient hypotensive: 79/33 mmHg, HR: 135 bpm at bedside  Monitor vitals closely, will consult ICU   Patient remains full code, as per proxy wishes  Unable to obtain a second IV access as grandson does not want staff to keep on picking patient. Explained the need for two lines for emergency anticipation, still refusing.  Dr Garzon

## 2018-08-26 NOTE — DIETITIAN INITIAL EVALUATION ADULT. - MD RECOMMEND
Advance diet to solids, if diet cannot be advanced, consider nutrition support , oral supplementation as needed

## 2018-08-26 NOTE — PROGRESS NOTE ADULT - SUBJECTIVE AND OBJECTIVE BOX
HPI:  89yo female, from NH, AAOx1, with PMHx Dementia, HTN, DM, HLD, Iron deficiency anemia, Urinary Incontinence, sent from NH for Hb: 5. Patient is very demented and unable to give further details. Resting comfortably in bed, no abdominal pain, N/V/ hematochezia or hematemesis. Grandson at bedside, refers that patient has been having dark bowel movements for a couple of months back, before being admitted to NH for rehab. Patient was recently admitted on July 30th for syncopal episode, found to be anemic (Hb: 7.1), FOBT (+), underwent EGD which was negative for active bleeding, found to have bazoar in stomach. 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 GOC discussed at length with grandson: Yousuf. Patient remains FULL CODE. He wants everything to be done for the patient, "until she tells me to stop". (23 Aug 2018 17:03)      Patient is a 89y old  Female who presents with a chief complaint of anemia (25 Aug 2018 16:47)      INTERVAL HPI/OVERNIGHT EVENTS:  T(C): 36.6 (08-26-18 @ 06:20), Max: 36.6 (08-26-18 @ 06:20)  HR: 100 (08-26-18 @ 06:20) (93 - 100)  BP: 133/76 (08-26-18 @ 06:20) (133/76 - 138/64)  RR: 17 (08-26-18 @ 06:20) (17 - 17)  SpO2: 100% (08-26-18 @ 06:20) (100% - 100%)  Wt(kg): --  I&O's Summary      REVIEW OF SYSTEMS: denies fever, chills, SOB, palpitations, chest pain, abdominal pain, nausea, vomitting, diarrhea, constipation, dizziness    MEDICATIONS  (STANDING):  dextrose 5% + sodium chloride 0.9%. 1000 milliLiter(s) (75 mL/Hr) IV Continuous <Continuous>  donepezil 10 milliGRAM(s) Oral at bedtime  furosemide   Injectable 20 milliGRAM(s) IV Push once  pantoprazole Infusion 8 mG/Hr (10 mL/Hr) IV Continuous <Continuous>  polyethylene glycol/electrolyte Solution. 4000 milliLiter(s) Oral once    MEDICATIONS  (PRN):  acetaminophen    Suspension. 650 milliGRAM(s) Oral every 6 hours PRN Moderate Pain (4 - 6)  glucagon  Injectable 1 milliGRAM(s) IntraMuscular once PRN Glucose LESS THAN 70 milligrams/deciliter      PHYSICAL EXAM:  GENERAL: NAD, well-groomed, well-developed  HEAD:  Atraumatic, Normocephalic  EYES: EOMI, PERRLA, conjunctiva and sclera clear  ENMT: No tonsillar erythema, exudates, or enlargement; Moist mucous membranes, Good dentition, No lesions  NECK: Supple, No JVD, Normal thyroid  NERVOUS SYSTEM:  Alert & Oriented X3, Good concentration; Motor Strength 5/5 B/L upper and lower extremities; DTRs 2+ intact and symmetric  CHEST/LUNG: Clear to percussion bilaterally; No rales, rhonchi, wheezing, or rubs  HEART: Regular rate and rhythm; No murmurs, rubs, or gallops  ABDOMEN: Soft, Nontender, Nondistended; Bowel sounds present  EXTREMITIES:  2+ Peripheral Pulses, No clubbing, cyanosis, or edema  LYMPH: No lymphadenopathy noted  SKIN: No rashes or lesions  LABS:                        9.5    15.0  )-----------( 235      ( 26 Aug 2018 12:22 )             29.0     08-26    141  |  110<H>  |  22<H>  ----------------------------<  144<H>  4.0   |  23  |  0.95    Ca    8.4      26 Aug 2018 08:05  Mg     1.5     08-25          CAPILLARY BLOOD GLUCOSE      POCT Blood Glucose.: 130 mg/dL (26 Aug 2018 11:38)  POCT Blood Glucose.: 145 mg/dL (26 Aug 2018 08:06)  POCT Blood Glucose.: 155 mg/dL (25 Aug 2018 18:06)

## 2018-08-26 NOTE — DIETITIAN INITIAL EVALUATION ADULT. - OTHER INFO
Per grandson, patient has lost 10% from usual in 1.5 weeks and regained 3% back ( 78.2 to 70.5 to 72.2kg) total weight loss: 7% in 1 month

## 2018-08-26 NOTE — CHART NOTE - NSCHARTNOTEFT_GEN_A_CORE
Upon Nutritional Assessment by the Registered Dietitian your patient was determined to meet criteria / has evidence of the following diagnosis/diagnoses:          [ ]  Mild Protein Calorie Malnutrition        [ ]  Moderate Protein Calorie Malnutrition        [x ] Severe Protein Calorie Malnutrition        [ ] Unspecified Protein Calorie Malnutrition        [ ] Underweight / BMI <19        [ ] Morbid Obesity / BMI > 40      Findings as based on:  •  Comprehensive nutrition assessment and consultation  •  Calorie counts (nutrient intake analysis)  •  Food acceptance and intake status from observations by staff  •  Follow up  •  Patient education  •  Intervention secondary to interdisciplinary rounds  •   concerns      Treatment:    The following diet has been recommended:      PROVIDER Section:     By signing this assessment you are acknowledging and agree with the diagnosis/diagnoses assigned by the Registered Dietitian    Comments:  Advance diet to solids, if diet cannot be advanced, consider nutrition support , oral supplementation as needed

## 2018-08-26 NOTE — DIETITIAN INITIAL EVALUATION ADULT. - PERTINENT LABORATORY DATA
08-26 Na141 mmol/L Glu 144 mg/dL<H> K+ 4.0 mmol/L Cr  0.95 mg/dL BUN 22 mg/dL<H> 08-24 Phos 3.7 mg/dL 08-23 Alb 2.6 g/dL<L> 08-24 [id number] 5.7 %<H> 07-31 Chol 85 mg/dL LDL 27 mg/dL HDL 46 mg/dL Trig 61 mg/dL

## 2018-08-26 NOTE — DIETITIAN INITIAL EVALUATION ADULT. - PROBLEM SELECTOR PLAN 5
[facility] discussed at length with Grandson (proxy): Yousuf, he wants everything to be done, regardless of the consequences of CPR/intubation, "until she tells me to stop". Patient remains FULL CODE.  Will consult palliative care, in setting of advanced dementia, pancreatic mass and GI bleed, grandson with unrealistic expectations.

## 2018-08-27 ENCOUNTER — RESULT REVIEW (OUTPATIENT)
Age: 83
End: 2018-08-27

## 2018-08-27 LAB
ANION GAP SERPL CALC-SCNC: 12 MMOL/L — SIGNIFICANT CHANGE UP (ref 5–17)
BASOPHILS # BLD AUTO: 0 K/UL — SIGNIFICANT CHANGE UP (ref 0–0.2)
BASOPHILS NFR BLD AUTO: 0.3 % — SIGNIFICANT CHANGE UP (ref 0–2)
BUN SERPL-MCNC: 16 MG/DL — SIGNIFICANT CHANGE UP (ref 7–18)
CALCIUM SERPL-MCNC: 8.5 MG/DL — SIGNIFICANT CHANGE UP (ref 8.4–10.5)
CHLORIDE SERPL-SCNC: 108 MMOL/L — SIGNIFICANT CHANGE UP (ref 96–108)
CO2 SERPL-SCNC: 21 MMOL/L — LOW (ref 22–31)
CREAT SERPL-MCNC: 0.87 MG/DL — SIGNIFICANT CHANGE UP (ref 0.5–1.3)
EOSINOPHIL # BLD AUTO: 0 K/UL — SIGNIFICANT CHANGE UP (ref 0–0.5)
EOSINOPHIL NFR BLD AUTO: 0.2 % — SIGNIFICANT CHANGE UP (ref 0–6)
GLUCOSE BLDC GLUCOMTR-MCNC: 100 MG/DL — HIGH (ref 70–99)
GLUCOSE BLDC GLUCOMTR-MCNC: 114 MG/DL — HIGH (ref 70–99)
GLUCOSE BLDC GLUCOMTR-MCNC: 136 MG/DL — HIGH (ref 70–99)
GLUCOSE SERPL-MCNC: 114 MG/DL — HIGH (ref 70–99)
HCT VFR BLD CALC: 24.6 % — LOW (ref 34.5–45)
HCT VFR BLD CALC: 28.5 % — LOW (ref 34.5–45)
HGB BLD-MCNC: 8 G/DL — LOW (ref 11.5–15.5)
HGB BLD-MCNC: 8.8 G/DL — LOW (ref 11.5–15.5)
LYMPHOCYTES # BLD AUTO: 1.4 K/UL — SIGNIFICANT CHANGE UP (ref 1–3.3)
LYMPHOCYTES # BLD AUTO: 9.6 % — LOW (ref 13–44)
MCHC RBC-ENTMCNC: 27.4 PG — SIGNIFICANT CHANGE UP (ref 27–34)
MCHC RBC-ENTMCNC: 27.8 PG — SIGNIFICANT CHANGE UP (ref 27–34)
MCHC RBC-ENTMCNC: 30.8 GM/DL — LOW (ref 32–36)
MCHC RBC-ENTMCNC: 32.4 GM/DL — SIGNIFICANT CHANGE UP (ref 32–36)
MCV RBC AUTO: 84.7 FL — SIGNIFICANT CHANGE UP (ref 80–100)
MCV RBC AUTO: 90.3 FL — SIGNIFICANT CHANGE UP (ref 80–100)
MONOCYTES # BLD AUTO: 1.1 K/UL — HIGH (ref 0–0.9)
MONOCYTES NFR BLD AUTO: 7.7 % — SIGNIFICANT CHANGE UP (ref 2–14)
NEUTROPHILS # BLD AUTO: 12.2 K/UL — HIGH (ref 1.8–7.4)
NEUTROPHILS NFR BLD AUTO: 82.2 % — HIGH (ref 43–77)
PLATELET # BLD AUTO: 322 K/UL — SIGNIFICANT CHANGE UP (ref 150–400)
PLATELET # BLD AUTO: 330 K/UL — SIGNIFICANT CHANGE UP (ref 150–400)
POTASSIUM SERPL-MCNC: 3.5 MMOL/L — SIGNIFICANT CHANGE UP (ref 3.5–5.3)
POTASSIUM SERPL-SCNC: 3.5 MMOL/L — SIGNIFICANT CHANGE UP (ref 3.5–5.3)
RBC # BLD: 2.91 M/UL — LOW (ref 3.8–5.2)
RBC # BLD: 3.16 M/UL — LOW (ref 3.8–5.2)
RBC # FLD: 18 % — HIGH (ref 10.3–14.5)
RBC # FLD: 19.3 % — HIGH (ref 10.3–14.5)
SODIUM SERPL-SCNC: 141 MMOL/L — SIGNIFICANT CHANGE UP (ref 135–145)
WBC # BLD: 14.8 K/UL — HIGH (ref 3.8–10.5)
WBC # BLD: 15.7 K/UL — HIGH (ref 3.8–10.5)
WBC # FLD AUTO: 14.8 K/UL — HIGH (ref 3.8–10.5)
WBC # FLD AUTO: 15.7 K/UL — HIGH (ref 3.8–10.5)

## 2018-08-27 PROCEDURE — 88305 TISSUE EXAM BY PATHOLOGIST: CPT | Mod: 26

## 2018-08-27 PROCEDURE — 45382 COLONOSCOPY W/CONTROL BLEED: CPT

## 2018-08-27 PROCEDURE — 88312 SPECIAL STAINS GROUP 1: CPT | Mod: 26

## 2018-08-27 PROCEDURE — 43239 EGD BIOPSY SINGLE/MULTIPLE: CPT

## 2018-08-27 RX ORDER — PANTOPRAZOLE SODIUM 20 MG/1
40 TABLET, DELAYED RELEASE ORAL
Qty: 0 | Refills: 0 | Status: DISCONTINUED | OUTPATIENT
Start: 2018-08-27 | End: 2018-09-06

## 2018-08-27 RX ORDER — IRON SUCROSE 20 MG/ML
100 INJECTION, SOLUTION INTRAVENOUS ONCE
Qty: 0 | Refills: 0 | Status: COMPLETED | OUTPATIENT
Start: 2018-08-27 | End: 2018-08-27

## 2018-08-27 RX ADMIN — DONEPEZIL HYDROCHLORIDE 10 MILLIGRAM(S): 10 TABLET, FILM COATED ORAL at 22:12

## 2018-08-27 RX ADMIN — IRON SUCROSE 210 MILLIGRAM(S): 20 INJECTION, SOLUTION INTRAVENOUS at 18:41

## 2018-08-27 RX ADMIN — PANTOPRAZOLE SODIUM 40 MILLIGRAM(S): 20 TABLET, DELAYED RELEASE ORAL at 18:41

## 2018-08-27 NOTE — PROGRESS NOTE ADULT - SUBJECTIVE AND OBJECTIVE BOX
PGY 1 Note discussed with supervising resident and primary attending    Patient is a 89y old  Female who presents with a chief complaint of anemia (25 Aug 2018 16:47)      INTERVAL HPI/OVERNIGHT EVENTS: Still A&Ox1 this morning, slightly more confused than her baseline; no acute events overnight    MEDICATIONS  (STANDING):  donepezil 10 milliGRAM(s) Oral at bedtime  furosemide   Injectable 20 milliGRAM(s) IV Push once  iron sucrose IVPB 100 milliGRAM(s) IV Intermittent once  pantoprazole  Injectable 40 milliGRAM(s) IV Push two times a day    MEDICATIONS  (PRN):  acetaminophen    Suspension. 650 milliGRAM(s) Oral every 6 hours PRN Moderate Pain (4 - 6)  glucagon  Injectable 1 milliGRAM(s) IntraMuscular once PRN Glucose LESS THAN 70 milligrams/deciliter      __________________________________________________  REVIEW OF SYSTEMS:    CONSTITUTIONAL: No fever,   EYES: no acute visual disturbances  NECK: No pain or stiffness  RESPIRATORY: No cough; No shortness of breath  CARDIOVASCULAR: No chest pain, no palpitations  GASTROINTESTINAL: No pain. No nausea or vomiting; No diarrhea   NEUROLOGICAL: No headache or numbness, no tremors  MUSCULOSKELETAL: No joint pain, no muscle pain  GENITOURINARY: no dysuria, no frequency, no hesitancy  PSYCHIATRY: no depression , no anxiety  ALL OTHER  ROS negative        Vital Signs Last 24 Hrs  T(C): 36.7 (27 Aug 2018 05:27), Max: 36.7 (27 Aug 2018 05:27)  T(F): 98 (27 Aug 2018 05:27), Max: 98 (27 Aug 2018 05:27)  HR: 110 (27 Aug 2018 06:38) (110 - 120)  BP: 141/87 (27 Aug 2018 06:38) (138/76 - 152/88)  BP(mean): --  RR: 18 (27 Aug 2018 05:27) (18 - 18)  SpO2: 100% (27 Aug 2018 05:27) (100% - 100%)    ________________________________________________  PHYSICAL EXAM:  GENERAL: NAD  HEENT: Normocephalic;  conjunctivae and sclerae clear; moist mucous membranes;   NECK : supple  CHEST/LUNG: Clear to auscultation bilaterally with good air entry   HEART: S1 S2  regular; no murmurs, gallops or rubs  ABDOMEN: Soft, Nontender, Nondistended; Bowel sounds present  EXTREMITIES: no cyanosis; no edema; no calf tenderness  SKIN: warm and dry; no rash  NERVOUS SYSTEM:  Awake and alert; Oriented  to place, person and time ; no new deficits    _________________________________________________  LABS:                        8.0    15.7  )-----------( 322      ( 27 Aug 2018 07:17 )             24.6     08-27    141  |  108  |  16  ----------------------------<  114<H>  3.5   |  21<L>  |  0.87    Ca    8.5      27 Aug 2018 07:17          CAPILLARY BLOOD GLUCOSE      POCT Blood Glucose.: 114 mg/dL (27 Aug 2018 06:38)  POCT Blood Glucose.: 136 mg/dL (27 Aug 2018 00:19)  POCT Blood Glucose.: 132 mg/dL (26 Aug 2018 18:01)        RADIOLOGY & ADDITIONAL TESTS:    Imaging Personally Reviewed:  YES    Consultant(s) Notes Reviewed:   YES    Care Discussed with Consultants :     Plan of care was discussed with patient and /or primary care giver; all questions and concerns were addressed and care was aligned with patient's wishes. PGY 1 Note discussed with supervising resident and primary attending    Patient is a 89y old  Female who presents with a chief complaint of anemia (25 Aug 2018 16:47)      INTERVAL HPI/OVERNIGHT EVENTS: Still A&Ox1 this morning, slightly more confused than her baseline; no acute events overnight    MEDICATIONS  (STANDING):  donepezil 10 milliGRAM(s) Oral at bedtime  furosemide   Injectable 20 milliGRAM(s) IV Push once  iron sucrose IVPB 100 milliGRAM(s) IV Intermittent once  pantoprazole  Injectable 40 milliGRAM(s) IV Push two times a day    MEDICATIONS  (PRN):  acetaminophen    Suspension. 650 milliGRAM(s) Oral every 6 hours PRN Moderate Pain (4 - 6)  glucagon  Injectable 1 milliGRAM(s) IntraMuscular once PRN Glucose LESS THAN 70 milligrams/deciliter      __________________________________________________  REVIEW OF SYSTEMS:    CONSTITUTIONAL: No fever,   EYES: no acute visual disturbances  NECK: No pain or stiffness  RESPIRATORY: No cough; No shortness of breath  CARDIOVASCULAR: No chest pain, no palpitations  GASTROINTESTINAL: No pain. No nausea or vomiting; No diarrhea   NEUROLOGICAL: No headache or numbness, no tremors  MUSCULOSKELETAL: No joint pain, no muscle pain  GENITOURINARY: no dysuria, no frequency, no hesitancy  ALL OTHER  ROS negative        Vital Signs Last 24 Hrs  T(C): 36.7 (27 Aug 2018 05:27), Max: 36.7 (27 Aug 2018 05:27)  T(F): 98 (27 Aug 2018 05:27), Max: 98 (27 Aug 2018 05:27)  HR: 110 (27 Aug 2018 06:38) (110 - 120)  BP: 141/87 (27 Aug 2018 06:38) (138/76 - 152/88)  BP(mean): --  RR: 18 (27 Aug 2018 05:27) (18 - 18)  SpO2: 100% (27 Aug 2018 05:27) (100% - 100%)    ________________________________________________  PHYSICAL EXAM:  GENERAL: NAD  HEENT: Normocephalic;  conjunctivae and sclerae clear; moist mucous membranes;   NECK : supple  CHEST/LUNG: Clear to auscultation bilaterally with good air entry   HEART: S1 S2  regular; no murmurs, gallops or rubs  ABDOMEN: Soft, Nontender, Nondistended; Bowel sounds present  EXTREMITIES: bilateral swelling noted over both upper extremities worse on Left UE due to ? IV infiltration with areas of erythema.  SKIN: warm and dry; no rash  NERVOUS SYSTEM:  Awake and alert; no new deficits    _________________________________________________  LABS:                        8.0    15.7  )-----------( 322      ( 27 Aug 2018 07:17 )             24.6     08-27    141  |  108  |  16  ----------------------------<  114<H>  3.5   |  21<L>  |  0.87    Ca    8.5      27 Aug 2018 07:17          CAPILLARY BLOOD GLUCOSE      POCT Blood Glucose.: 114 mg/dL (27 Aug 2018 06:38)  POCT Blood Glucose.: 136 mg/dL (27 Aug 2018 00:19)  POCT Blood Glucose.: 132 mg/dL (26 Aug 2018 18:01)        RADIOLOGY & ADDITIONAL TESTS:    Imaging Personally Reviewed:  YES    Consultant(s) Notes Reviewed:   YES    Care Discussed with Consultants :     Plan of care was discussed with patient and /or primary care giver; all questions and concerns were addressed and care was aligned with patient's wishes.

## 2018-08-27 NOTE — CHART NOTE - NSCHARTNOTEFT_GEN_A_CORE
Esophagogastroduodenoscopy Report  Indication: Dyspepsia    Referring MD: Dr. West   Instrument:  #[id number]  Anesthesia: MAC  Consent:  Informed consent was obtained from the patient after providing any opportunity for questions  Procedure: The gastroscope was gently passed through the incisoral orifice into the oral cavity and under direct visualization the esophagus was intubated. The endoscope was passed down the esophagus, through the stomach and into the 3rd portion. Color, texture, mucosa and anatomy of the esophagus, stomach, and duodenum were carefully examined with the scope. The patient tolerated the procedure well. After completion of the examination, the patient was transferred to the recovery room.   Preparation: NPO   Findings:   Oropharynx	Normal  Esophagus	Tortuous esophagus   EG-junction	Regular z-line at 37 cm from the incisors.   Cardia	Normal   Body	Solid food bolus.   Antrum	 Extending form the pylorus to the distal bulb, completely encompassing the entire bulb circumferentially a large clean bases ulcer with heaped up edges. Biopsy at the edge of the ulcer taken to rule out malignancy or extension of a malignancy.  The scope was able to pass the ulcer with some manipulation   Pylorus	AA  Duodenal Bulb	AA  2nd portion	Normal]  3rd portion	Normal  Date and time:8/27/2018 2:13:44 PM  EBL:0  Impression: 1-Large pyloric/duodenal bulb ulcer 2- Partial Gastric outlet obstruction 3- Likely source of anemia   Plan: 1- Liquid diet 2- Strict aspiration precautions 3- PPI BID 4- Follow up pathology        Procedure Start Time:  12:01 pm  Procedure End Time:   12:06 pm               Attending:      Amado Garzon M.D.  Date and Time: 8/27/2018 2:13:44 PM         Colonoscopy Report  Indication: Anemia   Referring: Dr. Izquierdo    Instrument:  #[id number]  Anesthesia: MAC  Consent:  Informed consent was obtained from the patient after providing any opportunity for questions  Procedure: After placing the patient in the left lateral decubitus position, the colonoscope was gently inserted into the rectum and under direct visualization advanced to the cecum.    Color, texture, mucosa, and anatomy of the colon were carefully examined with the scope. The patient tolerated the procedure well. After completion of the exam, the patient was transferred to the recovery room.     Preparation: Miralax and bisacodyl.  Prep was poor. BBPs= R=1 T=1 L=1  Findings:   Anal Canal	Normal    Rectum	Normal. Retro-flexion performed.   Sigmoid Colon 	Semi-solid stool No gross lesions   Descending Colon	Semi-solid stool No gross lesions  Splenic Flexure	Semi-solid stool No gross lesions  Transverse Colon	Semi-solid stool No gross lesions  Hepatic Flexure	Semi-solid stool No gross lesions  Ascending Colon	Semi-solid stool No gross lesions   Cecum	Fresh blood noted. Area of mild oozing heme noticed. The area was treated with 3 hemo-clips   Ileo-cecal Valve	Normal   Ileum 	Not seen   Date and time: 8/27/2018 2:09:37 PM  EBL:0    Impression: 1- Cecal ulcer with active oozing heme 2-Poor prep     Plan: 1- Proceed to EGD 2- Refer to EGD report for further recommendations.       Procedure Start Time: 13:29   Cecum Reached Time:  13:41  Procedure End Time:    13:53    Total Withdrawal Time:  12 minutes         Attending:         Amado Garzon M.D.   Date and Time: 8/27/2018 2:09:37 PM

## 2018-08-27 NOTE — PROGRESS NOTE ADULT - SUBJECTIVE AND OBJECTIVE BOX
came to see pt  pt went for egd / colonoscopy   d/w staff pt is doing ok  no events.  vs stable  no bleeding noted    little drop in hgb from 9 to 8  will watch   will d/w GI after procedure

## 2018-08-27 NOTE — PROGRESS NOTE ADULT - PROBLEM SELECTOR PLAN 1
Patient not having active bowel movements at the moment, not vomiting, asymptomatic.   EGD August 2018: no signs of active GI bleed  Keep Hb >8, follow CBC q6hrs for now. Recent Hb on 8/26 is 9.5.  Planned for colonoscopy and EGD on Monday 8/27.  NPO over midnight.  Colonoscopy performed 8/27 showed duodenal and cecal ulcers, non-bleeding  H/H 9.5/29 on 8/27 to 8/24.6 on 8/28, f/u H/H following day Patient not having active bowel movements at the moment, not vomiting, asymptomatic.   EGD August 2018: no signs of active GI bleed  Colonoscopy performed 8/27 showed duodenal and cecal ulcers  H/H 9.5/29 on 8/27 to 8/24.6 on 8/28, f/u H/H following day  will start on clear liquid diet and f/u pathology results

## 2018-08-28 LAB
ANION GAP SERPL CALC-SCNC: 12 MMOL/L — SIGNIFICANT CHANGE UP (ref 5–17)
APPEARANCE UR: SIGNIFICANT CHANGE UP
BASOPHILS # BLD AUTO: 0.1 K/UL — SIGNIFICANT CHANGE UP (ref 0–0.2)
BASOPHILS # BLD AUTO: 0.1 K/UL — SIGNIFICANT CHANGE UP (ref 0–0.2)
BASOPHILS NFR BLD AUTO: 0.7 % — SIGNIFICANT CHANGE UP (ref 0–2)
BASOPHILS NFR BLD AUTO: 1 % — SIGNIFICANT CHANGE UP (ref 0–2)
BILIRUB UR-MCNC: NEGATIVE — SIGNIFICANT CHANGE UP
BUN SERPL-MCNC: 13 MG/DL — SIGNIFICANT CHANGE UP (ref 7–18)
CALCIUM SERPL-MCNC: 8.4 MG/DL — SIGNIFICANT CHANGE UP (ref 8.4–10.5)
CHLORIDE SERPL-SCNC: 108 MMOL/L — SIGNIFICANT CHANGE UP (ref 96–108)
CO2 SERPL-SCNC: 20 MMOL/L — LOW (ref 22–31)
COLOR SPEC: YELLOW — SIGNIFICANT CHANGE UP
CREAT SERPL-MCNC: 0.87 MG/DL — SIGNIFICANT CHANGE UP (ref 0.5–1.3)
DIFF PNL FLD: ABNORMAL
EOSINOPHIL # BLD AUTO: 0 K/UL — SIGNIFICANT CHANGE UP (ref 0–0.5)
EOSINOPHIL # BLD AUTO: 0.1 K/UL — SIGNIFICANT CHANGE UP (ref 0–0.5)
EOSINOPHIL NFR BLD AUTO: 0.2 % — SIGNIFICANT CHANGE UP (ref 0–6)
EOSINOPHIL NFR BLD AUTO: 0.4 % — SIGNIFICANT CHANGE UP (ref 0–6)
GLUCOSE BLDC GLUCOMTR-MCNC: 103 MG/DL — HIGH (ref 70–99)
GLUCOSE BLDC GLUCOMTR-MCNC: 105 MG/DL — HIGH (ref 70–99)
GLUCOSE SERPL-MCNC: 97 MG/DL — SIGNIFICANT CHANGE UP (ref 70–99)
GLUCOSE UR QL: NEGATIVE — SIGNIFICANT CHANGE UP
HCT VFR BLD CALC: 23.4 % — LOW (ref 34.5–45)
HCT VFR BLD CALC: 24.5 % — LOW (ref 34.5–45)
HCT VFR BLD CALC: 24.8 % — LOW (ref 34.5–45)
HGB BLD-MCNC: 7.3 G/DL — LOW (ref 11.5–15.5)
HGB BLD-MCNC: 7.7 G/DL — LOW (ref 11.5–15.5)
HGB BLD-MCNC: 7.8 G/DL — LOW (ref 11.5–15.5)
KETONES UR-MCNC: ABNORMAL
LEUKOCYTE ESTERASE UR-ACNC: ABNORMAL
LYMPHOCYTES # BLD AUTO: 1.1 K/UL — SIGNIFICANT CHANGE UP (ref 1–3.3)
LYMPHOCYTES # BLD AUTO: 1.8 K/UL — SIGNIFICANT CHANGE UP (ref 1–3.3)
LYMPHOCYTES # BLD AUTO: 13.8 % — SIGNIFICANT CHANGE UP (ref 13–44)
LYMPHOCYTES # BLD AUTO: 8.2 % — LOW (ref 13–44)
MAGNESIUM SERPL-MCNC: 1.7 MG/DL — SIGNIFICANT CHANGE UP (ref 1.6–2.6)
MCHC RBC-ENTMCNC: 26.8 PG — LOW (ref 27–34)
MCHC RBC-ENTMCNC: 27.2 PG — SIGNIFICANT CHANGE UP (ref 27–34)
MCHC RBC-ENTMCNC: 27.2 PG — SIGNIFICANT CHANGE UP (ref 27–34)
MCHC RBC-ENTMCNC: 31.2 GM/DL — LOW (ref 32–36)
MCHC RBC-ENTMCNC: 31.4 GM/DL — LOW (ref 32–36)
MCHC RBC-ENTMCNC: 31.4 GM/DL — LOW (ref 32–36)
MCV RBC AUTO: 85.8 FL — SIGNIFICANT CHANGE UP (ref 80–100)
MCV RBC AUTO: 86.5 FL — SIGNIFICANT CHANGE UP (ref 80–100)
MCV RBC AUTO: 86.6 FL — SIGNIFICANT CHANGE UP (ref 80–100)
MONOCYTES # BLD AUTO: 1.3 K/UL — HIGH (ref 0–0.9)
MONOCYTES # BLD AUTO: 1.4 K/UL — HIGH (ref 0–0.9)
MONOCYTES NFR BLD AUTO: 10.2 % — SIGNIFICANT CHANGE UP (ref 2–14)
MONOCYTES NFR BLD AUTO: 10.4 % — SIGNIFICANT CHANGE UP (ref 2–14)
NEUTROPHILS # BLD AUTO: 10.9 K/UL — HIGH (ref 1.8–7.4)
NEUTROPHILS # BLD AUTO: 9.7 K/UL — HIGH (ref 1.8–7.4)
NEUTROPHILS NFR BLD AUTO: 75 % — SIGNIFICANT CHANGE UP (ref 43–77)
NEUTROPHILS NFR BLD AUTO: 80.2 % — HIGH (ref 43–77)
NITRITE UR-MCNC: POSITIVE
PH UR: 6 — SIGNIFICANT CHANGE UP (ref 5–8)
PHOSPHATE SERPL-MCNC: 2.5 MG/DL — SIGNIFICANT CHANGE UP (ref 2.5–4.5)
PLATELET # BLD AUTO: 315 K/UL — SIGNIFICANT CHANGE UP (ref 150–400)
PLATELET # BLD AUTO: 318 K/UL — SIGNIFICANT CHANGE UP (ref 150–400)
PLATELET # BLD AUTO: 327 K/UL — SIGNIFICANT CHANGE UP (ref 150–400)
POTASSIUM SERPL-MCNC: 3.5 MMOL/L — SIGNIFICANT CHANGE UP (ref 3.5–5.3)
POTASSIUM SERPL-SCNC: 3.5 MMOL/L — SIGNIFICANT CHANGE UP (ref 3.5–5.3)
PROT UR-MCNC: 30 MG/DL
RBC # BLD: 2.73 M/UL — LOW (ref 3.8–5.2)
RBC # BLD: 2.83 M/UL — LOW (ref 3.8–5.2)
RBC # BLD: 2.86 M/UL — LOW (ref 3.8–5.2)
RBC # FLD: 17.8 % — HIGH (ref 10.3–14.5)
RBC # FLD: 18.1 % — HIGH (ref 10.3–14.5)
RBC # FLD: 18.6 % — HIGH (ref 10.3–14.5)
SODIUM SERPL-SCNC: 140 MMOL/L — SIGNIFICANT CHANGE UP (ref 135–145)
SP GR SPEC: 1.02 — SIGNIFICANT CHANGE UP (ref 1.01–1.02)
UROBILINOGEN FLD QL: 1
WBC # BLD: 12.9 K/UL — HIGH (ref 3.8–10.5)
WBC # BLD: 13.6 K/UL — HIGH (ref 3.8–10.5)
WBC # BLD: 15.5 K/UL — HIGH (ref 3.8–10.5)
WBC # FLD AUTO: 12.9 K/UL — HIGH (ref 3.8–10.5)
WBC # FLD AUTO: 13.6 K/UL — HIGH (ref 3.8–10.5)
WBC # FLD AUTO: 15.5 K/UL — HIGH (ref 3.8–10.5)

## 2018-08-28 PROCEDURE — 71045 X-RAY EXAM CHEST 1 VIEW: CPT | Mod: 26

## 2018-08-28 RX ADMIN — Medication 650 MILLIGRAM(S): at 23:31

## 2018-08-28 RX ADMIN — Medication 650 MILLIGRAM(S): at 22:31

## 2018-08-28 RX ADMIN — PANTOPRAZOLE SODIUM 40 MILLIGRAM(S): 20 TABLET, DELAYED RELEASE ORAL at 05:01

## 2018-08-28 RX ADMIN — DONEPEZIL HYDROCHLORIDE 10 MILLIGRAM(S): 10 TABLET, FILM COATED ORAL at 22:31

## 2018-08-28 RX ADMIN — PANTOPRAZOLE SODIUM 40 MILLIGRAM(S): 20 TABLET, DELAYED RELEASE ORAL at 17:42

## 2018-08-28 NOTE — PROGRESS NOTE ADULT - PROBLEM SELECTOR PLAN 1
Patient not having active bowel movements at the moment, not vomiting, asymptomatic.   EGD August 2018: no signs of active GI bleed  Colonoscopy performed 8/27 showed duodenal and cecal ulcers  H/H 9.5/29 on 8/27 to 8/24.6 on 8/28, f/u H/H following day  will start on clear liquid diet and f/u pathology results  H/H continues to fall gradually 7.7 8/28 morning  Will transfusea if hgb falls below 7.0   Consent to transfuse acquired  T&S ordered  No hematochezia, melena, hematemesis noted

## 2018-08-28 NOTE — PROGRESS NOTE ADULT - SUBJECTIVE AND OBJECTIVE BOX
PGY 1 Note discussed with supervising resident and primary attending    Patient is a 89y old  Female who presents with a chief complaint of anemia (25 Aug 2018 16:47)      INTERVAL HPI/OVERNIGHT EVENTS: offers no new complaints; no acute events overnight; swelling slightly better compared to yesterday    MEDICATIONS  (STANDING):  donepezil 10 milliGRAM(s) Oral at bedtime  furosemide   Injectable 20 milliGRAM(s) IV Push once  pantoprazole  Injectable 40 milliGRAM(s) IV Push two times a day    MEDICATIONS  (PRN):  acetaminophen    Suspension. 650 milliGRAM(s) Oral every 6 hours PRN Moderate Pain (4 - 6)  glucagon  Injectable 1 milliGRAM(s) IntraMuscular once PRN Glucose LESS THAN 70 milligrams/deciliter      __________________________________________________  REVIEW OF SYSTEMS:    CONSTITUTIONAL: No fever,   EYES: no acute visual disturbances  NECK: No pain or stiffness  RESPIRATORY: No cough; No shortness of breath  CARDIOVASCULAR: No chest pain, no palpitations  GASTROINTESTINAL: No pain. No nausea or vomiting; No diarrhea   NEUROLOGICAL: No headache or numbness, no tremors  MUSCULOSKELETAL: No joint pain, no muscle pain  GENITOURINARY: no dysuria, no frequency, no hesitancy  PSYCHIATRY: no depression , no anxiety  ALL OTHER  ROS negative        Vital Signs Last 24 Hrs  T(C): 37.2 (28 Aug 2018 13:49), Max: 37.7 (28 Aug 2018 06:57)  T(F): 98.9 (28 Aug 2018 13:49), Max: 99.9 (28 Aug 2018 06:57)  HR: 97 (28 Aug 2018 13:49) (82 - 115)  BP: 135/55 (28 Aug 2018 13:49) (135/55 - 161/89)  BP(mean): --  RR: 17 (28 Aug 2018 13:49) (16 - 19)  SpO2: 97% (28 Aug 2018 13:49) (97% - 100%)    ________________________________________________  PHYSICAL EXAM:  GENERAL: NAD  HEENT: Normocephalic;  conjunctivae and sclerae clear; moist mucous membranes;   NECK : supple  CHEST/LUNG: Clear to auscultation bilaterally with good air entry   HEART: S1 S2  regular; no murmurs, gallops or rubs  ABDOMEN: Soft, Nontender, slightly distended; Bowel sounds present  EXTREMITIES: no cyanosis; no calf tenderness; edema present diffusely  SKIN: warm and dry; no rash  NERVOUS SYSTEM:  patient confused; able to converse; c/w baseline dementia    _________________________________________________  LABS:                        7.7    15.5  )-----------( 327      ( 28 Aug 2018 12:16 )             24.5         140  |  108  |  13  ----------------------------<  97  3.5   |  20<L>  |  0.87    Ca    8.4      28 Aug 2018 07:47  Phos  2.5       Mg     1.7             Urinalysis Basic - ( 28 Aug 2018 16:00 )    Color: Yellow / Appearance: Hazy / S.020 / pH: x  Gluc: x / Ketone: Moderate  / Bili: Negative / Urobili: 1   Blood: x / Protein: 30 mg/dL / Nitrite: Positive   Leuk Esterase: Trace / RBC: 2-5 /HPF / WBC 0-2 /HPF   Sq Epi: x / Non Sq Epi: Few /HPF / Bacteria: Many /HPF      CAPILLARY BLOOD GLUCOSE      POCT Blood Glucose.: 105 mg/dL (28 Aug 2018 11:44)  POCT Blood Glucose.: 103 mg/dL (28 Aug 2018 08:06)        RADIOLOGY & ADDITIONAL TESTS:    Imaging Personally Reviewed:  YES    Consultant(s) Notes Reviewed:   YES    Care Discussed with Consultants :     Plan of care was discussed with patient and /or primary care giver; all questions and concerns were addressed and care was aligned with patient's wishes.

## 2018-08-29 LAB
ANION GAP SERPL CALC-SCNC: 8 MMOL/L — SIGNIFICANT CHANGE UP (ref 5–17)
BASOPHILS # BLD AUTO: 0.1 K/UL — SIGNIFICANT CHANGE UP (ref 0–0.2)
BASOPHILS NFR BLD AUTO: 0.5 % — SIGNIFICANT CHANGE UP (ref 0–2)
BUN SERPL-MCNC: 11 MG/DL — SIGNIFICANT CHANGE UP (ref 7–18)
CALCIUM SERPL-MCNC: 8.2 MG/DL — LOW (ref 8.4–10.5)
CHLORIDE SERPL-SCNC: 107 MMOL/L — SIGNIFICANT CHANGE UP (ref 96–108)
CO2 SERPL-SCNC: 25 MMOL/L — SIGNIFICANT CHANGE UP (ref 22–31)
CREAT SERPL-MCNC: 0.85 MG/DL — SIGNIFICANT CHANGE UP (ref 0.5–1.3)
EOSINOPHIL # BLD AUTO: 0.1 K/UL — SIGNIFICANT CHANGE UP (ref 0–0.5)
EOSINOPHIL NFR BLD AUTO: 0.6 % — SIGNIFICANT CHANGE UP (ref 0–6)
GLUCOSE BLDC GLUCOMTR-MCNC: 133 MG/DL — HIGH (ref 70–99)
GLUCOSE BLDC GLUCOMTR-MCNC: 142 MG/DL — HIGH (ref 70–99)
GLUCOSE BLDC GLUCOMTR-MCNC: 154 MG/DL — HIGH (ref 70–99)
GLUCOSE SERPL-MCNC: 123 MG/DL — HIGH (ref 70–99)
HCT VFR BLD CALC: 27.4 % — LOW (ref 34.5–45)
HGB BLD-MCNC: 8.8 G/DL — LOW (ref 11.5–15.5)
LYMPHOCYTES # BLD AUTO: 1.9 K/UL — SIGNIFICANT CHANGE UP (ref 1–3.3)
LYMPHOCYTES # BLD AUTO: 13.9 % — SIGNIFICANT CHANGE UP (ref 13–44)
MCHC RBC-ENTMCNC: 27.6 PG — SIGNIFICANT CHANGE UP (ref 27–34)
MCHC RBC-ENTMCNC: 32.2 GM/DL — SIGNIFICANT CHANGE UP (ref 32–36)
MCV RBC AUTO: 85.7 FL — SIGNIFICANT CHANGE UP (ref 80–100)
MONOCYTES # BLD AUTO: 1.2 K/UL — HIGH (ref 0–0.9)
MONOCYTES NFR BLD AUTO: 8.8 % — SIGNIFICANT CHANGE UP (ref 2–14)
NEUTROPHILS # BLD AUTO: 10.2 K/UL — HIGH (ref 1.8–7.4)
NEUTROPHILS NFR BLD AUTO: 76.1 % — SIGNIFICANT CHANGE UP (ref 43–77)
PLATELET # BLD AUTO: 328 K/UL — SIGNIFICANT CHANGE UP (ref 150–400)
POTASSIUM SERPL-MCNC: 3.5 MMOL/L — SIGNIFICANT CHANGE UP (ref 3.5–5.3)
POTASSIUM SERPL-SCNC: 3.5 MMOL/L — SIGNIFICANT CHANGE UP (ref 3.5–5.3)
RBC # BLD: 3.2 M/UL — LOW (ref 3.8–5.2)
RBC # FLD: 16.6 % — HIGH (ref 10.3–14.5)
SODIUM SERPL-SCNC: 140 MMOL/L — SIGNIFICANT CHANGE UP (ref 135–145)
WBC # BLD: 13.4 K/UL — HIGH (ref 3.8–10.5)
WBC # FLD AUTO: 13.4 K/UL — HIGH (ref 3.8–10.5)

## 2018-08-29 PROCEDURE — 99233 SBSQ HOSP IP/OBS HIGH 50: CPT

## 2018-08-29 RX ADMIN — PANTOPRAZOLE SODIUM 40 MILLIGRAM(S): 20 TABLET, DELAYED RELEASE ORAL at 18:11

## 2018-08-29 RX ADMIN — PANTOPRAZOLE SODIUM 40 MILLIGRAM(S): 20 TABLET, DELAYED RELEASE ORAL at 05:42

## 2018-08-29 NOTE — PHYSICAL THERAPY INITIAL EVALUATION ADULT - PLANNED THERAPY INTERVENTIONS, PT EVAL
postural re-education/neuromuscular re-education/gait training/strengthening/transfer training/ROM/stretching/balance training/bed mobility training

## 2018-08-29 NOTE — PROGRESS NOTE ADULT - SUBJECTIVE AND OBJECTIVE BOX
HPI:  89yo female, from NH, AAOx1, with PMHx Dementia, HTN, DM, HLD, Iron deficiency anemia, Urinary Incontinence, sent from NH for Hb: 5. Patient is very demented and unable to give further details. Resting comfortably in bed, no abdominal pain, N/V/ hematochezia or hematemesis. Grandson at bedside, refers that patient has been having dark bowel movements for a couple of months back, before being admitted to NH for rehab. Patient was recently admitted on  for syncopal episode, found to be anemic (Hb: 7.1), FOBT (+), underwent EGD which was negative for active bleeding, found to have bazoar in stomach. 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 GOC discussed at length with grandson: Yousuf. Patient remains FULL CODE. He wants everything to be done for the patient, "until she tells me to stop". (23 Aug 2018 17:03)      Patient is a 89y old  Female who presents with a chief complaint of anemia (25 Aug 2018 16:47)      INTERVAL HPI/OVERNIGHT EVENTS:  T(C): 37.2 (18 @ 05:49), Max: 37.5 (18 @ 19:21)  HR: 113 (18 @ 14:09) (93 - 113)  BP: 151/86 (18 @ 14:09) (129/70 - 162/93)  RR: 18 (18 @ 05:49) (17 - 18)  SpO2: 97% (18 @ 14:09) (97% - 100%)  Wt(kg): --  I&O's Summary      REVIEW OF SYSTEMS: denies fever, chills, SOB, palpitations, chest pain, abdominal pain, nausea, vomitting, diarrhea, constipation, dizziness    MEDICATIONS  (STANDING):  donepezil 10 milliGRAM(s) Oral at bedtime  furosemide   Injectable 20 milliGRAM(s) IV Push once  pantoprazole  Injectable 40 milliGRAM(s) IV Push two times a day    MEDICATIONS  (PRN):  acetaminophen    Suspension. 650 milliGRAM(s) Oral every 6 hours PRN Moderate Pain (4 - 6)  glucagon  Injectable 1 milliGRAM(s) IntraMuscular once PRN Glucose LESS THAN 70 milligrams/deciliter      PHYSICAL EXAM:  GENERAL: NAD, well-groomed, well-developed  HEAD:  Atraumatic, Normocephalic  EYES: EOMI, PERRLA, conjunctiva and sclera clear  ENMT: No tonsillar erythema, exudates, or enlargement; Moist mucous membranes, Good dentition, No lesions  NECK: Supple, No JVD, Normal thyroid  NERVOUS SYSTEM:  Alert & Oriented X3, Good concentration; Motor Strength 5/5 B/L upper and lower extremities; DTRs 2+ intact and symmetric  CHEST/LUNG: Clear to percussion bilaterally; No rales, rhonchi, wheezing, or rubs  HEART: Regular rate and rhythm; No murmurs, rubs, or gallops  ABDOMEN: Soft, Nontender, Nondistended; Bowel sounds present  EXTREMITIES:  2+ Peripheral Pulses, No clubbing, cyanosis, or edema  LYMPH: No lymphadenopathy noted  SKIN: No rashes or lesions  LABS:                        8.8    13.4  )-----------( 328      ( 29 Aug 2018 06:16 )             27.4         140  |  107  |  11  ----------------------------<  123<H>  3.5   |  25  |  0.85    Ca    8.2<L>      29 Aug 2018 06:16  Phos  2.5       Mg     1.7             Urinalysis Basic - ( 28 Aug 2018 16:00 )    Color: Yellow / Appearance: Hazy / S.020 / pH: x  Gluc: x / Ketone: Moderate  / Bili: Negative / Urobili: 1   Blood: x / Protein: 30 mg/dL / Nitrite: Positive   Leuk Esterase: Trace / RBC: 2-5 /HPF / WBC 0-2 /HPF   Sq Epi: x / Non Sq Epi: Few /HPF / Bacteria: Many /HPF      CAPILLARY BLOOD GLUCOSE      POCT Blood Glucose.: 154 mg/dL (29 Aug 2018 11:46)  POCT Blood Glucose.: 133 mg/dL (29 Aug 2018 08:16)  POCT Blood Glucose.: 142 mg/dL (29 Aug 2018 00:57)        Urinalysis Basic - ( 28 Aug 2018 16:00 )    Color: Yellow / Appearance: Hazy / S.020 / pH: x  Gluc: x / Ketone: Moderate  / Bili: Negative / Urobili: 1   Blood: x / Protein: 30 mg/dL / Nitrite: Positive   Leuk Esterase: Trace / RBC: 2-5 /HPF / WBC 0-2 /HPF   Sq Epi: x / Non Sq Epi: Few /HPF / Bacteria: Many /HPF

## 2018-08-29 NOTE — PROGRESS NOTE ADULT - SUBJECTIVE AND OBJECTIVE BOX
PGY 1 Note discussed with supervising resident and primary attending    Patient is a 89y old  Female who presents with a chief complaint of anemia (25 Aug 2018 16:47)      INTERVAL HPI/OVERNIGHT EVENTS: patient remains clinically stable after 1 PRBC transfusion yesterday    MEDICATIONS  (STANDING):  donepezil 10 milliGRAM(s) Oral at bedtime  furosemide   Injectable 20 milliGRAM(s) IV Push once  pantoprazole  Injectable 40 milliGRAM(s) IV Push two times a day    MEDICATIONS  (PRN):  acetaminophen    Suspension. 650 milliGRAM(s) Oral every 6 hours PRN Moderate Pain (4 - 6)  glucagon  Injectable 1 milliGRAM(s) IntraMuscular once PRN Glucose LESS THAN 70 milligrams/deciliter      __________________________________________________  REVIEW OF SYSTEMS: denies fever, chills, SOB, palpitations, chest pain, abdominal pain, nausea, vomitting, diarrhea, constipation, dizziness      Vital Signs Last 24 Hrs  T(C): 37.2 (29 Aug 2018 05:49), Max: 37.5 (28 Aug 2018 19:21)  T(F): 98.9 (29 Aug 2018 05:49), Max: 99.5 (28 Aug 2018 19:21)  HR: 113 (29 Aug 2018 14:09) (93 - 113)  BP: 151/86 (29 Aug 2018 14:09) (129/70 - 162/93)  BP(mean): --  RR: 18 (29 Aug 2018 05:49) (17 - 18)  SpO2: 97% (29 Aug 2018 14:09) (97% - 100%)    ________________________________________________  PHYSICAL EXAM:  GENERAL: NAD  HEENT: Normocephalic;  conjunctivae and sclerae clear; moist mucous membranes;   NECK : supple  CHEST/LUNG: Clear to auscultation bilaterally with good air entry   HEART: S1 S2  regular; no murmurs, gallops or rubs  ABDOMEN: Soft, Nontender, Nondistended; Bowel sounds present  EXTREMITIES: edema of all extremities  SKIN: warm and dry; no rash  NERVOUS SYSTEM:  no new deficits    _________________________________________________  LABS:                        8.8    13.4  )-----------( 328      ( 29 Aug 2018 06:16 )             27.4         140  |  107  |  11  ----------------------------<  123<H>  3.5   |  25  |  0.85    Ca    8.2<L>      29 Aug 2018 06:16  Phos  2.5       Mg     1.7             Urinalysis Basic - ( 28 Aug 2018 16:00 )    Color: Yellow / Appearance: Hazy / S.020 / pH: x  Gluc: x / Ketone: Moderate  / Bili: Negative / Urobili: 1   Blood: x / Protein: 30 mg/dL / Nitrite: Positive   Leuk Esterase: Trace / RBC: 2-5 /HPF / WBC 0-2 /HPF   Sq Epi: x / Non Sq Epi: Few /HPF / Bacteria: Many /HPF      CAPILLARY BLOOD GLUCOSE      POCT Blood Glucose.: 154 mg/dL (29 Aug 2018 11:46)  POCT Blood Glucose.: 133 mg/dL (29 Aug 2018 08:16)  POCT Blood Glucose.: 142 mg/dL (29 Aug 2018 00:57)        RADIOLOGY & ADDITIONAL TESTS:    Imaging Personally Reviewed:  YES    Consultant(s) Notes Reviewed:   YES    Care Discussed with Consultants :     Plan of care was discussed with patient and /or primary care giver; all questions and concerns were addressed and care was aligned with patient's wishes.

## 2018-08-29 NOTE — PHYSICAL THERAPY INITIAL EVALUATION ADULT - IMPAIRED TRANSFERS: BED/CHAIR, REHAB EVAL
decreased ROM/impaired balance/cognition/decreased strength/narrow base of support/pain/impaired postural control

## 2018-08-29 NOTE — PHYSICAL THERAPY INITIAL EVALUATION ADULT - ACTIVE RANGE OF MOTION EXAMINATION, REHAB EVAL
bilateral upper extremity Active ROM was WFL (within functional limits)/except b/l shoulders ~1/3 range and hips ~1/3 range./bilateral  lower extremity Active ROM was WFL (within functional limits)

## 2018-08-29 NOTE — CHART NOTE - NSCHARTNOTEFT_GEN_A_CORE
Assessment:     Nutrition re-assessment for NPO/Clear liquid diet status x 7d. Chart reviewed, pt visited, confused; Discussed with RN,     Factors impacting intake: [ ] none [ ] nausea  [ ] vomiting [ ] diarrhea [ ] constipation  [ ]chewing problems [ ] swallowing issues  [ X ] other: s/p GI bleeding, Endoscopy     Diet Presciption: Diet, Clear Liquid (18 @ 20:39)    Intake: tolerating sips of clear liquid, but poor appetite reported     Daily Weight in k.8 (28 Aug 2018 06:57)  Weight in k (27 Aug 2018 05:27)  Weight in k (26 Aug 2018 15:50)  Weight in k.3 (25 Aug 2018 05:40)    % Weight Change: see above     Pertinent Medications: MEDICATIONS  (STANDING):  donepezil 10 milliGRAM(s) Oral at bedtime  furosemide   Injectable 20 milliGRAM(s) IV Push once  pantoprazole  Injectable 40 milliGRAM(s) IV Push two times a day    MEDICATIONS  (PRN):  acetaminophen    Suspension. 650 milliGRAM(s) Oral every 6 hours PRN Moderate Pain (4 - 6)  glucagon  Injectable 1 milliGRAM(s) IntraMuscular once PRN Glucose LESS THAN 70 milligrams/deciliter    Pertinent Labs:  Na140 mmol/L Glu 123 mg/dL<H> K+ 3.5 mmol/L Cr  0.85 mg/dL BUN 11 mg/dL  Phos 2.5 mg/dL  Alb 2.6 g/dL<L>  [id number] 5.7 %<H>  Chol 85 mg/dL LDL 27 mg/dL HDL 46 mg/dL Trig 61 mg/dL     CAPILLARY BLOOD GLUCOSE      POCT Blood Glucose.: 133 mg/dL (29 Aug 2018 08:16)  POCT Blood Glucose.: 142 mg/dL (29 Aug 2018 00:57)  POCT Blood Glucose.: 105 mg/dL (28 Aug 2018 11:44)    Skin: intact     Estimated Needs:   [ X ] no change since previous assessment  [ ] recalculated:     Previous Nutrition Diagnosis:   [ X  ] Malnutrition (Severe)     Nutrition Diagnosis is [ X ] ongoing  [ ] Improving   [ ] resolved [ ] not applicable     New Nutrition Diagnosis: [ X ] not applicable       Interventions:   Recommend  [ X ] Change Diet To: Advance diet or consider nutritional supplement if Clear liquid diet prolonged as medically feasible   [ X ] Nutrition Supplement: Add Ethan Clears 1can tid (600kcal, 21g protein)   [ ] Nutrition Support  [ ] Other:     Monitoring and Evaluation:   [ X ] PO intake [ x ] Tolerance to diet prescription [ x ] weights [ x ] labs[ x ] follow up per protocol  [ ] other:

## 2018-08-29 NOTE — PROGRESS NOTE ADULT - PROBLEM SELECTOR PLAN 1
Patient not having active bowel movements at the moment, not vomiting, asymptomatic.   EGD August 2018: no signs of active GI bleed  Colonoscopy performed 8/27 showed duodenal and cecal ulcers  H/H 9.5/29 on 8/27 to 8/24.6 on 8/28, f/u H/H following day  will start on clear liquid diet and f/u pathology results  H/H continues to fall gradually 7.7 8/28 morning  Will transfusea if hgb falls below 7.0   Consent to transfuse acquired  T&S ordered  No hematochezia, melena, hematemesis noted  Patient Hgb 7.7 yesterday morning dropped to below 7.0, received 1 PRBC overnight, Hgb stabilized to 8.8 this morning  Will monitor Hgb stabilization

## 2018-08-29 NOTE — PROGRESS NOTE ADULT - SUBJECTIVE AND OBJECTIVE BOX
Subjective:     No new complaints   Objective:    MEDICATIONS  (STANDING):  donepezil 10 milliGRAM(s) Oral at bedtime  furosemide   Injectable 20 milliGRAM(s) IV Push once  pantoprazole  Injectable 40 milliGRAM(s) IV Push two times a day    MEDICATIONS  (PRN):  acetaminophen    Suspension. 650 milliGRAM(s) Oral every 6 hours PRN Moderate Pain (4 - 6)  glucagon  Injectable 1 milliGRAM(s) IntraMuscular once PRN Glucose LESS THAN 70 milligrams/deciliter              Vital Signs Last 24 Hrs  T(C): 37.2 (29 Aug 2018 05:49), Max: 37.5 (28 Aug 2018 19:21)  T(F): 98.9 (29 Aug 2018 05:49), Max: 99.5 (28 Aug 2018 19:21)  HR: 113 (29 Aug 2018 14:09) (93 - 113)  BP: 151/86 (29 Aug 2018 14:09) (129/70 - 162/93)  BP(mean): --  RR: 18 (29 Aug 2018 05:49) (17 - 18)  SpO2: 97% (29 Aug 2018 14:09) (97% - 100%)      General:  NAD.  Cardiovascular:  RRR normal S1/S2, no murmur.  Respiratory:  CTA B/L, normal respiratory effort.   Abdominal:   soft, no masses or tenderness, normoactive BS, NT/ND, no HSM.  Extremities:   No clubbing or cyanosis, normal capillary refill, no edema.   Skin:   No rash, jaundice, lesions, eczema.   Musculoskeletal:  No joint swelling, erythema or tenderness.      Other:       LABS:                        8.8    13.4  )-----------( 328      ( 29 Aug 2018 06:16 )             27.4     08-    140  |  107  |  11  ----------------------------<  123<H>  3.5   |  25  |  0.85    Ca    8.2<L>      29 Aug 2018 06:16  Phos  2.5     08-  Mg     1.7             Urinalysis Basic - ( 28 Aug 2018 16:00 )    Color: Yellow / Appearance: Hazy / S.020 / pH: x  Gluc: x / Ketone: Moderate  / Bili: Negative / Urobili: 1   Blood: x / Protein: 30 mg/dL / Nitrite: Positive   Leuk Esterase: Trace / RBC: 2-5 /HPF / WBC 0-2 /HPF   Sq Epi: x / Non Sq Epi: Few /HPF / Bacteria: Many /HPF        RADIOLOGY & ADDITIONAL TESTS:

## 2018-08-29 NOTE — PHYSICAL THERAPY INITIAL EVALUATION ADULT - IMPAIRMENTS FOUND, PT EVAL
muscle strength/ROM/gait, locomotion, and balance/aerobic capacity/endurance/cognitive impairment/posture

## 2018-08-29 NOTE — PHYSICAL THERAPY INITIAL EVALUATION ADULT - CRITERIA FOR SKILLED THERAPEUTIC INTERVENTIONS
rehab potential/therapy frequency/impairments found/anticipated discharge recommendation/functional limitations in following categories/predicted duration of therapy intervention

## 2018-08-30 LAB
ALBUMIN SERPL ELPH-MCNC: 1.8 G/DL — LOW (ref 3.5–5)
ALP SERPL-CCNC: 129 U/L — HIGH (ref 40–120)
ALT FLD-CCNC: 58 U/L DA — SIGNIFICANT CHANGE UP (ref 10–60)
ANION GAP SERPL CALC-SCNC: 12 MMOL/L — SIGNIFICANT CHANGE UP (ref 5–17)
AST SERPL-CCNC: 48 U/L — HIGH (ref 10–40)
BILIRUB DIRECT SERPL-MCNC: 0.6 MG/DL — HIGH (ref 0–0.2)
BILIRUB INDIRECT FLD-MCNC: 0.5 MG/DL — SIGNIFICANT CHANGE UP (ref 0.2–1)
BILIRUB SERPL-MCNC: 1.1 MG/DL — SIGNIFICANT CHANGE UP (ref 0.2–1.2)
BUN SERPL-MCNC: 11 MG/DL — SIGNIFICANT CHANGE UP (ref 7–18)
CALCIUM SERPL-MCNC: 8.7 MG/DL — SIGNIFICANT CHANGE UP (ref 8.4–10.5)
CHLORIDE SERPL-SCNC: 104 MMOL/L — SIGNIFICANT CHANGE UP (ref 96–108)
CO2 SERPL-SCNC: 22 MMOL/L — SIGNIFICANT CHANGE UP (ref 22–31)
CREAT SERPL-MCNC: 0.86 MG/DL — SIGNIFICANT CHANGE UP (ref 0.5–1.3)
GLUCOSE BLDC GLUCOMTR-MCNC: 103 MG/DL — HIGH (ref 70–99)
GLUCOSE BLDC GLUCOMTR-MCNC: 107 MG/DL — HIGH (ref 70–99)
GLUCOSE BLDC GLUCOMTR-MCNC: 112 MG/DL — HIGH (ref 70–99)
GLUCOSE BLDC GLUCOMTR-MCNC: 133 MG/DL — HIGH (ref 70–99)
GLUCOSE BLDC GLUCOMTR-MCNC: 133 MG/DL — HIGH (ref 70–99)
GLUCOSE BLDC GLUCOMTR-MCNC: 147 MG/DL — HIGH (ref 70–99)
GLUCOSE SERPL-MCNC: 90 MG/DL — SIGNIFICANT CHANGE UP (ref 70–99)
HCT VFR BLD CALC: 28.6 % — LOW (ref 34.5–45)
HGB BLD-MCNC: 9.2 G/DL — LOW (ref 11.5–15.5)
MCHC RBC-ENTMCNC: 27.9 PG — SIGNIFICANT CHANGE UP (ref 27–34)
MCHC RBC-ENTMCNC: 32.3 GM/DL — SIGNIFICANT CHANGE UP (ref 32–36)
MCV RBC AUTO: 86.4 FL — SIGNIFICANT CHANGE UP (ref 80–100)
PLATELET # BLD AUTO: 369 K/UL — SIGNIFICANT CHANGE UP (ref 150–400)
POTASSIUM SERPL-MCNC: 3.5 MMOL/L — SIGNIFICANT CHANGE UP (ref 3.5–5.3)
POTASSIUM SERPL-SCNC: 3.5 MMOL/L — SIGNIFICANT CHANGE UP (ref 3.5–5.3)
PROT SERPL-MCNC: 6.8 G/DL — SIGNIFICANT CHANGE UP (ref 6–8.3)
RBC # BLD: 3.31 M/UL — LOW (ref 3.8–5.2)
RBC # FLD: 16.1 % — HIGH (ref 10.3–14.5)
SODIUM SERPL-SCNC: 138 MMOL/L — SIGNIFICANT CHANGE UP (ref 135–145)
WBC # BLD: 15 K/UL — HIGH (ref 3.8–10.5)
WBC # FLD AUTO: 15 K/UL — HIGH (ref 3.8–10.5)

## 2018-08-30 RX ORDER — AMPICILLIN SODIUM AND SULBACTAM SODIUM 250; 125 MG/ML; MG/ML
1.5 INJECTION, POWDER, FOR SUSPENSION INTRAMUSCULAR; INTRAVENOUS EVERY 6 HOURS
Qty: 0 | Refills: 0 | Status: DISCONTINUED | OUTPATIENT
Start: 2018-08-30 | End: 2018-09-03

## 2018-08-30 RX ORDER — SIMVASTATIN 20 MG/1
1 TABLET, FILM COATED ORAL
Qty: 0 | Refills: 0 | COMMUNITY

## 2018-08-30 RX ORDER — DONEPEZIL HYDROCHLORIDE 10 MG/1
1 TABLET, FILM COATED ORAL
Qty: 0 | Refills: 0 | COMMUNITY

## 2018-08-30 RX ORDER — FUROSEMIDE 40 MG
20 TABLET ORAL
Qty: 0 | Refills: 0 | COMMUNITY
Start: 2018-08-30

## 2018-08-30 RX ADMIN — AMPICILLIN SODIUM AND SULBACTAM SODIUM 100 GRAM(S): 250; 125 INJECTION, POWDER, FOR SUSPENSION INTRAMUSCULAR; INTRAVENOUS at 18:52

## 2018-08-30 RX ADMIN — DONEPEZIL HYDROCHLORIDE 10 MILLIGRAM(S): 10 TABLET, FILM COATED ORAL at 00:04

## 2018-08-30 RX ADMIN — PANTOPRAZOLE SODIUM 40 MILLIGRAM(S): 20 TABLET, DELAYED RELEASE ORAL at 18:52

## 2018-08-30 RX ADMIN — DONEPEZIL HYDROCHLORIDE 10 MILLIGRAM(S): 10 TABLET, FILM COATED ORAL at 22:04

## 2018-08-30 RX ADMIN — AMPICILLIN SODIUM AND SULBACTAM SODIUM 100 GRAM(S): 250; 125 INJECTION, POWDER, FOR SUSPENSION INTRAMUSCULAR; INTRAVENOUS at 13:52

## 2018-08-30 RX ADMIN — PANTOPRAZOLE SODIUM 40 MILLIGRAM(S): 20 TABLET, DELAYED RELEASE ORAL at 06:12

## 2018-08-30 RX ADMIN — Medication 650 MILLIGRAM(S): at 23:00

## 2018-08-30 RX ADMIN — Medication 650 MILLIGRAM(S): at 22:04

## 2018-08-30 NOTE — PROGRESS NOTE ADULT - SUBJECTIVE AND OBJECTIVE BOX
seen nd examined   low grade fever  no cough, no dysurea.  lt wrist swollen painful   ice packs  will get xray of wrist.  hgb stable  upper and lower gi ulcers  gi on board  watch hgb.  cbc, bmp in am

## 2018-08-31 LAB
ANION GAP SERPL CALC-SCNC: 8 MMOL/L — SIGNIFICANT CHANGE UP (ref 5–17)
ANION GAP SERPL CALC-SCNC: 9 MMOL/L — SIGNIFICANT CHANGE UP (ref 5–17)
BASE EXCESS BLDA CALC-SCNC: 0.4 MMOL/L — SIGNIFICANT CHANGE UP (ref -2–2)
BASOPHILS # BLD AUTO: 0.1 K/UL — SIGNIFICANT CHANGE UP (ref 0–0.2)
BASOPHILS NFR BLD AUTO: 0.7 % — SIGNIFICANT CHANGE UP (ref 0–2)
BLOOD GAS COMMENTS ARTERIAL: SIGNIFICANT CHANGE UP
BUN SERPL-MCNC: 14 MG/DL — SIGNIFICANT CHANGE UP (ref 7–18)
BUN SERPL-MCNC: 15 MG/DL — SIGNIFICANT CHANGE UP (ref 7–18)
CALCIUM SERPL-MCNC: 8.6 MG/DL — SIGNIFICANT CHANGE UP (ref 8.4–10.5)
CALCIUM SERPL-MCNC: 8.7 MG/DL — SIGNIFICANT CHANGE UP (ref 8.4–10.5)
CHLORIDE SERPL-SCNC: 105 MMOL/L — SIGNIFICANT CHANGE UP (ref 96–108)
CHLORIDE SERPL-SCNC: 105 MMOL/L — SIGNIFICANT CHANGE UP (ref 96–108)
CO2 SERPL-SCNC: 26 MMOL/L — SIGNIFICANT CHANGE UP (ref 22–31)
CO2 SERPL-SCNC: 26 MMOL/L — SIGNIFICANT CHANGE UP (ref 22–31)
CREAT SERPL-MCNC: 0.87 MG/DL — SIGNIFICANT CHANGE UP (ref 0.5–1.3)
CREAT SERPL-MCNC: 0.96 MG/DL — SIGNIFICANT CHANGE UP (ref 0.5–1.3)
EOSINOPHIL # BLD AUTO: 0.1 K/UL — SIGNIFICANT CHANGE UP (ref 0–0.5)
EOSINOPHIL NFR BLD AUTO: 0.9 % — SIGNIFICANT CHANGE UP (ref 0–6)
GLUCOSE BLDC GLUCOMTR-MCNC: 127 MG/DL — HIGH (ref 70–99)
GLUCOSE BLDC GLUCOMTR-MCNC: 129 MG/DL — HIGH (ref 70–99)
GLUCOSE BLDC GLUCOMTR-MCNC: 132 MG/DL — HIGH (ref 70–99)
GLUCOSE BLDC GLUCOMTR-MCNC: 147 MG/DL — HIGH (ref 70–99)
GLUCOSE SERPL-MCNC: 103 MG/DL — HIGH (ref 70–99)
GLUCOSE SERPL-MCNC: 119 MG/DL — HIGH (ref 70–99)
HCO3 BLDA-SCNC: 24 MMOL/L — SIGNIFICANT CHANGE UP (ref 23–27)
HCT VFR BLD CALC: 28 % — LOW (ref 34.5–45)
HCT VFR BLD CALC: 29.3 % — LOW (ref 34.5–45)
HGB BLD-MCNC: 8.9 G/DL — LOW (ref 11.5–15.5)
HGB BLD-MCNC: 9.3 G/DL — LOW (ref 11.5–15.5)
HOROWITZ INDEX BLDA+IHG-RTO: 28 — SIGNIFICANT CHANGE UP
LACTATE SERPL-SCNC: 0.7 MMOL/L — SIGNIFICANT CHANGE UP (ref 0.7–2)
LYMPHOCYTES # BLD AUTO: 1.6 K/UL — SIGNIFICANT CHANGE UP (ref 1–3.3)
LYMPHOCYTES # BLD AUTO: 12.1 % — LOW (ref 13–44)
MAGNESIUM SERPL-MCNC: 1.7 MG/DL — SIGNIFICANT CHANGE UP (ref 1.6–2.6)
MAGNESIUM SERPL-MCNC: 1.7 MG/DL — SIGNIFICANT CHANGE UP (ref 1.6–2.6)
MCHC RBC-ENTMCNC: 27.8 PG — SIGNIFICANT CHANGE UP (ref 27–34)
MCHC RBC-ENTMCNC: 27.8 PG — SIGNIFICANT CHANGE UP (ref 27–34)
MCHC RBC-ENTMCNC: 31.9 GM/DL — LOW (ref 32–36)
MCHC RBC-ENTMCNC: 32 GM/DL — SIGNIFICANT CHANGE UP (ref 32–36)
MCV RBC AUTO: 86.9 FL — SIGNIFICANT CHANGE UP (ref 80–100)
MCV RBC AUTO: 87.2 FL — SIGNIFICANT CHANGE UP (ref 80–100)
MONOCYTES # BLD AUTO: 1.3 K/UL — HIGH (ref 0–0.9)
MONOCYTES NFR BLD AUTO: 9.7 % — SIGNIFICANT CHANGE UP (ref 2–14)
NEUTROPHILS # BLD AUTO: 9.9 K/UL — HIGH (ref 1.8–7.4)
NEUTROPHILS NFR BLD AUTO: 76.6 % — SIGNIFICANT CHANGE UP (ref 43–77)
PCO2 BLDA: 35 MMHG — SIGNIFICANT CHANGE UP (ref 32–46)
PH BLDA: 7.44 — SIGNIFICANT CHANGE UP (ref 7.35–7.45)
PHOSPHATE SERPL-MCNC: 3.3 MG/DL — SIGNIFICANT CHANGE UP (ref 2.5–4.5)
PHOSPHATE SERPL-MCNC: 3.6 MG/DL — SIGNIFICANT CHANGE UP (ref 2.5–4.5)
PLATELET # BLD AUTO: 392 K/UL — SIGNIFICANT CHANGE UP (ref 150–400)
PLATELET # BLD AUTO: 427 K/UL — HIGH (ref 150–400)
PO2 BLDA: 89 MMHG — SIGNIFICANT CHANGE UP (ref 74–108)
POTASSIUM SERPL-MCNC: 3.9 MMOL/L — SIGNIFICANT CHANGE UP (ref 3.5–5.3)
POTASSIUM SERPL-MCNC: 4.1 MMOL/L — SIGNIFICANT CHANGE UP (ref 3.5–5.3)
POTASSIUM SERPL-SCNC: 3.9 MMOL/L — SIGNIFICANT CHANGE UP (ref 3.5–5.3)
POTASSIUM SERPL-SCNC: 4.1 MMOL/L — SIGNIFICANT CHANGE UP (ref 3.5–5.3)
RBC # BLD: 3.22 M/UL — LOW (ref 3.8–5.2)
RBC # BLD: 3.36 M/UL — LOW (ref 3.8–5.2)
RBC # FLD: 16.4 % — HIGH (ref 10.3–14.5)
RBC # FLD: 16.8 % — HIGH (ref 10.3–14.5)
SAO2 % BLDA: 98 % — HIGH (ref 92–96)
SODIUM SERPL-SCNC: 139 MMOL/L — SIGNIFICANT CHANGE UP (ref 135–145)
SODIUM SERPL-SCNC: 140 MMOL/L — SIGNIFICANT CHANGE UP (ref 135–145)
WBC # BLD: 13 K/UL — HIGH (ref 3.8–10.5)
WBC # BLD: 13.1 K/UL — HIGH (ref 3.8–10.5)
WBC # FLD AUTO: 13 K/UL — HIGH (ref 3.8–10.5)
WBC # FLD AUTO: 13.1 K/UL — HIGH (ref 3.8–10.5)

## 2018-08-31 PROCEDURE — 73110 X-RAY EXAM OF WRIST: CPT | Mod: 26,LT

## 2018-08-31 PROCEDURE — 99232 SBSQ HOSP IP/OBS MODERATE 35: CPT

## 2018-08-31 PROCEDURE — 71045 X-RAY EXAM CHEST 1 VIEW: CPT | Mod: 26

## 2018-08-31 PROCEDURE — 70450 CT HEAD/BRAIN W/O DYE: CPT | Mod: 26

## 2018-08-31 RX ADMIN — AMPICILLIN SODIUM AND SULBACTAM SODIUM 100 GRAM(S): 250; 125 INJECTION, POWDER, FOR SUSPENSION INTRAMUSCULAR; INTRAVENOUS at 06:24

## 2018-08-31 RX ADMIN — DONEPEZIL HYDROCHLORIDE 10 MILLIGRAM(S): 10 TABLET, FILM COATED ORAL at 21:36

## 2018-08-31 RX ADMIN — PANTOPRAZOLE SODIUM 40 MILLIGRAM(S): 20 TABLET, DELAYED RELEASE ORAL at 06:24

## 2018-08-31 RX ADMIN — AMPICILLIN SODIUM AND SULBACTAM SODIUM 100 GRAM(S): 250; 125 INJECTION, POWDER, FOR SUSPENSION INTRAMUSCULAR; INTRAVENOUS at 00:08

## 2018-08-31 RX ADMIN — PANTOPRAZOLE SODIUM 40 MILLIGRAM(S): 20 TABLET, DELAYED RELEASE ORAL at 17:27

## 2018-08-31 RX ADMIN — AMPICILLIN SODIUM AND SULBACTAM SODIUM 100 GRAM(S): 250; 125 INJECTION, POWDER, FOR SUSPENSION INTRAMUSCULAR; INTRAVENOUS at 17:27

## 2018-08-31 RX ADMIN — AMPICILLIN SODIUM AND SULBACTAM SODIUM 100 GRAM(S): 250; 125 INJECTION, POWDER, FOR SUSPENSION INTRAMUSCULAR; INTRAVENOUS at 13:15

## 2018-08-31 RX ADMIN — Medication 650 MILLIGRAM(S): at 23:00

## 2018-08-31 RX ADMIN — Medication 650 MILLIGRAM(S): at 21:36

## 2018-08-31 NOTE — CHART NOTE - NSCHARTNOTEFT_GEN_A_CORE
Patient was noted to be altered today as per A, who reported that she noticed that patient did not had her breakfast this AM and is more drowsy. Earlier this AM, patient was seen by intern during morning rounds when she was alert, awake, even confirmed with medical staff. During AM bedside rounds, patient was drowsy but arousable. Examined patient at bedside, who is drowsy, arousable, occasionally following commands, as per A this is not her baseline. Apparently patient had a good night sleep last night and did not receive any medications that will make her drowsy.   Patient is being currently worked up for infection as she persists to have leucocytosis and low grade temp, likely etiology being left upper extremity cellulitis.   - Vitals are stable  - patient has no crackles on exam, S1 S2 regular, no abdominal tenderness, LUE bruise near antecubital fossa and Lt wrist erythema noted.  - CT head s/o no acute pathology , will send stat labs and UA.  - Discussed with Grand Son Mr. To over phone, who understands that his grandmother's health slowly getting worse Patient was noted to be altered today as per HHA, who reported that she noticed that patient did not had her breakfast this AM and is more drowsy. Earlier this AM, patient was seen by intern during morning rounds when she was alert, awake, even confirmed with medical staff. During AM bedside rounds, patient was drowsy but arousable. Examined patient at bedside, who is drowsy, arousable, occasionally following commands, as per A this is not her baseline. Apparently patient had a good night sleep last night and did not receive any medications that will make her drowsy.   Patient is being currently worked up for infection as she persists to have leucocytosis and low grade temp, likely etiology being left upper extremity cellulitis.   - Vitals are stable  - patient has no crackles on exam, S1 S2 regular, no abdominal tenderness, LUE bruise near antecubital fossa and Lt wrist erythema noted.  - CT head s/o no acute pathology , will send stat labs and UA.  - Discussed with Grand Son/ HCP Mr. To over phone, who understands that his grandmother's health slowly getting worse since she got admitted, and is worried about her declining health, discussed in detail about the current medical condition, and given underlying comorbidities, patient may not be functioning well as earlier. Health care proxy/ Grand Son understands the current medical condition and wants his grandmother to be comfortable and wants her to be DNR/ DNI.  - will get palliative care consult - Zoey NP to follow up.  - Meanwhile will void Molst form and refill a new DNR/ DNI form  - Discussed with attending who agrees with plan of care. Patient was noted to be altered today as per A, who reported that she noticed that patient did not had her breakfast this AM and is more drowsy. Earlier this AM, patient was seen by intern during morning rounds when she was alert, awake, even confirmed with medical staff. During AM bedside rounds, patient was drowsy but arousable. Examined patient at bedside, who is drowsy, arousable, occasionally following commands, as per A this is not her baseline. Apparently patient had a good night sleep last night and did not receive any medications that will make her drowsy.   Patient is being currently worked up for infection as she persists to have leucocytosis and low grade temp, likely etiology being left upper extremity cellulitis.   - Vitals are stable  - patient has no crackles on exam, S1 S2 regular, no abdominal tenderness, LUE bruise near antecubital fossa and Lt wrist erythema noted.  - CT head s/o no acute pathology , will send stat labs and UA.  - Discussed with Grand Son/ HCP Mr. To over phone, who understands that his grandmother's health slowly getting worse since she got admitted, and is worried about her declining health, discussed in detail about the current medical condition, and given underlying comorbidities, patient may not be functioning well as earlier. Health care proxy/ Grand Son understands the current medical condition and wants his grandmother to be comfortable and wants her to be DNR/ DNI.  - will get palliative care consult - Zoey NP to follow up.  - Meanwhile will void Molst form and refill a new DNR/ DNI form  - Discussed with attending who agrees with plan of care.    Addendum:   Discussed with Mr To at the bedside, who confirmed again that he wants patient to be DNR/DNI, at the same time would like to consider pressors, central line, IV fluids, IV antibiotics, artificial tube feeds incase if needed. Patient is more alert, had pudding and soup with assistance. Patient's labs at baseline with no electrolyte abnormalities.  Will continue to monitor closely

## 2018-08-31 NOTE — PROGRESS NOTE ADULT - SUBJECTIVE AND OBJECTIVE BOX
PGY 1 Note discussed with supervising resident and primary attending    Patient is a 89y old  Female who presents with a chief complaint of anemia (25 Aug 2018 16:47)      INTERVAL HPI/OVERNIGHT EVENTS: patient became more lethargic this morning; less verbal, responsive; CTH normal    MEDICATIONS  (STANDING):  ampicillin/sulbactam  IVPB 1.5 Gram(s) IV Intermittent every 6 hours  donepezil 10 milliGRAM(s) Oral at bedtime  furosemide   Injectable 20 milliGRAM(s) IV Push once  pantoprazole  Injectable 40 milliGRAM(s) IV Push two times a day    MEDICATIONS  (PRN):  acetaminophen    Suspension. 650 milliGRAM(s) Oral every 6 hours PRN Moderate Pain (4 - 6)  glucagon  Injectable 1 milliGRAM(s) IntraMuscular once PRN Glucose LESS THAN 70 milligrams/deciliter      __________________________________________________  REVIEW OF SYSTEMS:    CONSTITUTIONAL: No fever,   NECK: No pain or stiffness  RESPIRATORY: No cough;   CARDIOVASCULAR: No chest pain, no palpitations  GASTROINTESTINAL: No nausea or vomiting; No diarrhea   NEUROLOGICAL: No headache or numbness, no tremors      Vital Signs Last 24 Hrs  T(C): 36.6 (31 Aug 2018 13:38), Max: 36.6 (31 Aug 2018 05:43)  T(F): 97.9 (31 Aug 2018 13:38), Max: 97.9 (31 Aug 2018 13:38)  HR: 46 (31 Aug 2018 13:38) (46 - 96)  BP: 139/67 (31 Aug 2018 13:38) (138/58 - 145/77)  BP(mean): --  RR: 17 (31 Aug 2018 13:38) (17 - 18)  SpO2: 100% (31 Aug 2018 13:38) (98% - 100%)    ________________________________________________  PHYSICAL EXAM:  GENERAL: NAD  HEENT: Normocephalic;  conjunctivae and sclerae clear; moist mucous membranes;   NECK : supple  CHEST/LUNG: Clear to auscultation bilaterally with good air entry   HEART: S1 S2  regular; no murmurs, gallops or rubs  ABDOMEN: Soft, obese, Nontender, slightly distended; Bowel sounds present  EXTREMITIES: edemas present in UE b/l  NERVOUS SYSTEM:  appears more lethargic    _________________________________________________  LABS:                        9.3    13.0  )-----------( 427      ( 31 Aug 2018 15:04 )             29.3     08-31    140  |  105  |  15  ----------------------------<  103<H>  4.1   |  26  |  0.96    Ca    8.6      31 Aug 2018 15:04  Phos  3.6     08-31  Mg     1.7     08-31    TPro  6.8  /  Alb  1.8<L>  /  TBili  1.1  /  DBili  0.6<H>  /  AST  48<H>  /  ALT  58  /  AlkPhos  129<H>  08-30        CAPILLARY BLOOD GLUCOSE      POCT Blood Glucose.: 147 mg/dL (31 Aug 2018 17:42)  POCT Blood Glucose.: 129 mg/dL (31 Aug 2018 12:08)  POCT Blood Glucose.: 127 mg/dL (31 Aug 2018 08:05)  POCT Blood Glucose.: 133 mg/dL (30 Aug 2018 22:21)        RADIOLOGY & ADDITIONAL TESTS:    Imaging Personally Reviewed:  YES    Consultant(s) Notes Reviewed:   YES    Care Discussed with Consultants :     Plan of care was discussed with patient and /or primary care giver; all questions and concerns were addressed and care was aligned with patient's wishes.

## 2018-08-31 NOTE — PROGRESS NOTE ADULT - SUBJECTIVE AND OBJECTIVE BOX
HPI:  89yo female, from NH, AAOx1, with PMHx Dementia, HTN, DM, HLD, Iron deficiency anemia, Urinary Incontinence, sent from NH for Hb: 5. Patient is very demented and unable to give further details. Resting comfortably in bed, no abdominal pain, N/V/ hematochezia or hematemesis. Grandson at bedside, refers that patient has been having dark bowel movements for a couple of months back, before being admitted to NH for rehab. Patient was recently admitted on July 30th for syncopal episode, found to be anemic (Hb: 7.1), FOBT (+), underwent EGD which was negative for active bleeding, found to have bazoar in stomach. 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 GOC discussed at length with grandson: Yousuf. Patient remains FULL CODE. He wants everything to be done for the patient, "until she tells me to stop". (23 Aug 2018 17:03)      Patient is a 89y old  Female who presents with a chief complaint of anemia (25 Aug 2018 16:47)      INTERVAL HPI/OVERNIGHT EVENTS:  T(C): 36.6 (08-31-18 @ 13:38), Max: 37.1 (08-30-18 @ 15:51)  HR: 46 (08-31-18 @ 13:38) (46 - 97)  BP: 139/67 (08-31-18 @ 13:38) (123/55 - 145/77)  RR: 17 (08-31-18 @ 13:38) (16 - 18)  SpO2: 100% (08-31-18 @ 13:38) (98% - 100%)  Wt(kg): --  I&O's Summary      REVIEW OF SYSTEMS: denies fever, chills, SOB, palpitations, chest pain, abdominal pain, nausea, vomitting, diarrhea, constipation, dizziness    MEDICATIONS  (STANDING):  ampicillin/sulbactam  IVPB 1.5 Gram(s) IV Intermittent every 6 hours  donepezil 10 milliGRAM(s) Oral at bedtime  furosemide   Injectable 20 milliGRAM(s) IV Push once  pantoprazole  Injectable 40 milliGRAM(s) IV Push two times a day    MEDICATIONS  (PRN):  acetaminophen    Suspension. 650 milliGRAM(s) Oral every 6 hours PRN Moderate Pain (4 - 6)  glucagon  Injectable 1 milliGRAM(s) IntraMuscular once PRN Glucose LESS THAN 70 milligrams/deciliter      PHYSICAL EXAM:  GENERAL: NAD, well-groomed, well-developed  HEAD:  Atraumatic, Normocephalic  EYES: EOMI, PERRLA, conjunctiva and sclera clear  ENMT: No tonsillar erythema, exudates, or enlargement; Moist mucous membranes, Good dentition, No lesions  NECK: Supple, No JVD, Normal thyroid  NERVOUS SYSTEM:  Alert & Oriented X3, Good concentration; Motor Strength 5/5 B/L upper and lower extremities; DTRs 2+ intact and symmetric  CHEST/LUNG: Clear to percussion bilaterally; No rales, rhonchi, wheezing, or rubs  HEART: Regular rate and rhythm; No murmurs, rubs, or gallops  ABDOMEN: Soft, Nontender, Nondistended; Bowel sounds present  EXTREMITIES:  2+ Peripheral Pulses, No clubbing, cyanosis, or edema  LYMPH: No lymphadenopathy noted  SKIN: No rashes or lesions  LABS:                        9.3    13.0  )-----------( 427      ( 31 Aug 2018 15:04 )             29.3     08-31    140  |  105  |  15  ----------------------------<  103<H>  4.1   |  26  |  0.96    Ca    8.6      31 Aug 2018 15:04  Phos  3.6     08-31  Mg     1.7     08-31    TPro  6.8  /  Alb  1.8<L>  /  TBili  1.1  /  DBili  0.6<H>  /  AST  48<H>  /  ALT  58  /  AlkPhos  129<H>  08-30        CAPILLARY BLOOD GLUCOSE      POCT Blood Glucose.: 129 mg/dL (31 Aug 2018 12:08)  POCT Blood Glucose.: 127 mg/dL (31 Aug 2018 08:05)  POCT Blood Glucose.: 133 mg/dL (30 Aug 2018 22:21)  POCT Blood Glucose.: 147 mg/dL (30 Aug 2018 16:53)      ABG - ( 31 Aug 2018 13:20 )  pH, Arterial: 7.44  pH, Blood: x     /  pCO2: 35    /  pO2: 89    / HCO3: 24    / Base Excess: 0.4   /  SaO2: 98

## 2018-08-31 NOTE — PROGRESS NOTE ADULT - PROBLEM SELECTOR PLAN 1
Patient not having active bowel movements at the moment, not vomiting, asymptomatic.   EGD August 2018: no signs of active GI bleed  Colonoscopy performed 8/27 showed duodenal and cecal ulcers  H/H stablized  AMS this morning, less verbal, less responsive  Pending neuro f/u  CTH negative for acute pathology  Currently being worked up for infection for rising WBC and low grade temp, likely etiology left upper extremity cellulitis  Family notified of pt's declining health  Palliative Zoey NP notified  DNR/DNI instated by HCP

## 2018-08-31 NOTE — PROGRESS NOTE ADULT - SUBJECTIVE AND OBJECTIVE BOX
Subjective:   Resting comfortable   No new complaints   Objective:    MEDICATIONS  (STANDING):  donepezil 10 milliGRAM(s) Oral at bedtime  furosemide   Injectable 20 milliGRAM(s) IV Push once  pantoprazole  Injectable 40 milliGRAM(s) IV Push two times a day    MEDICATIONS  (PRN):  acetaminophen    Suspension. 650 milliGRAM(s) Oral every 6 hours PRN Moderate Pain (4 - 6)  glucagon  Injectable 1 milliGRAM(s) IntraMuscular once PRN Glucose LESS THAN 70 milligrams/deciliter              Vital Signs Last 24 Hrs  T(C): 37.2 (29 Aug 2018 05:49), Max: 37.5 (28 Aug 2018 19:21)  T(F): 98.9 (29 Aug 2018 05:49), Max: 99.5 (28 Aug 2018 19:21)  HR: 113 (29 Aug 2018 14:09) (93 - 113)  BP: 151/86 (29 Aug 2018 14:09) (129/70 - 162/93)  BP(mean): --  RR: 18 (29 Aug 2018 05:49) (17 - 18)  SpO2: 97% (29 Aug 2018 14:09) (97% - 100%)      General:  NAD.  Cardiovascular:  RRR normal S1/S2, no murmur.  Respiratory:  CTA B/L, normal respiratory effort.   Abdominal:   soft, no masses or tenderness, normoactive BS, NT/ND, no HSM.  Extremities:   No clubbing or cyanosis, normal capillary refill, no edema.   Skin:   No rash, jaundice, lesions, eczema.   Musculoskeletal:  No joint swelling, erythema or tenderness.      Other:       LABS:                        8.8    13.4  )-----------( 328      ( 29 Aug 2018 06:16 )             27.4     08-    140  |  107  |  11  ----------------------------<  123<H>  3.5   |  25  |  0.85    Ca    8.2<L>      29 Aug 2018 06:16  Phos  2.5     08-  Mg     1.7     08-        Urinalysis Basic - ( 28 Aug 2018 16:00 )    Color: Yellow / Appearance: Hazy / S.020 / pH: x  Gluc: x / Ketone: Moderate  / Bili: Negative / Urobili: 1   Blood: x / Protein: 30 mg/dL / Nitrite: Positive   Leuk Esterase: Trace / RBC: 2-5 /HPF / WBC 0-2 /HPF   Sq Epi: x / Non Sq Epi: Few /HPF / Bacteria: Many /HPF        RADIOLOGY & ADDITIONAL TESTS:

## 2018-08-31 NOTE — ADVANCED PRACTICE NURSE CONSULT - ASSESSMENT
This is a 89yr old female patient admitted for Hypotension, to which upon assessment, the patients skin is intact

## 2018-09-01 LAB
ANION GAP SERPL CALC-SCNC: 9 MMOL/L — SIGNIFICANT CHANGE UP (ref 5–17)
BASOPHILS # BLD AUTO: 0.1 K/UL — SIGNIFICANT CHANGE UP (ref 0–0.2)
BASOPHILS NFR BLD AUTO: 0.7 % — SIGNIFICANT CHANGE UP (ref 0–2)
BUN SERPL-MCNC: 16 MG/DL — SIGNIFICANT CHANGE UP (ref 7–18)
CALCIUM SERPL-MCNC: 8.8 MG/DL — SIGNIFICANT CHANGE UP (ref 8.4–10.5)
CHLORIDE SERPL-SCNC: 105 MMOL/L — SIGNIFICANT CHANGE UP (ref 96–108)
CO2 SERPL-SCNC: 26 MMOL/L — SIGNIFICANT CHANGE UP (ref 22–31)
CREAT SERPL-MCNC: 0.96 MG/DL — SIGNIFICANT CHANGE UP (ref 0.5–1.3)
EOSINOPHIL # BLD AUTO: 0.2 K/UL — SIGNIFICANT CHANGE UP (ref 0–0.5)
EOSINOPHIL NFR BLD AUTO: 1.4 % — SIGNIFICANT CHANGE UP (ref 0–6)
GLUCOSE BLDC GLUCOMTR-MCNC: 112 MG/DL — HIGH (ref 70–99)
GLUCOSE BLDC GLUCOMTR-MCNC: 113 MG/DL — HIGH (ref 70–99)
GLUCOSE BLDC GLUCOMTR-MCNC: 127 MG/DL — HIGH (ref 70–99)
GLUCOSE BLDC GLUCOMTR-MCNC: 162 MG/DL — HIGH (ref 70–99)
GLUCOSE SERPL-MCNC: 93 MG/DL — SIGNIFICANT CHANGE UP (ref 70–99)
HCT VFR BLD CALC: 28.6 % — LOW (ref 34.5–45)
HGB BLD-MCNC: 9 G/DL — LOW (ref 11.5–15.5)
LYMPHOCYTES # BLD AUTO: 1.6 K/UL — SIGNIFICANT CHANGE UP (ref 1–3.3)
LYMPHOCYTES # BLD AUTO: 13.2 % — SIGNIFICANT CHANGE UP (ref 13–44)
MCHC RBC-ENTMCNC: 27.9 PG — SIGNIFICANT CHANGE UP (ref 27–34)
MCHC RBC-ENTMCNC: 31.5 GM/DL — LOW (ref 32–36)
MCV RBC AUTO: 88.6 FL — SIGNIFICANT CHANGE UP (ref 80–100)
MONOCYTES # BLD AUTO: 1.3 K/UL — HIGH (ref 0–0.9)
MONOCYTES NFR BLD AUTO: 11 % — SIGNIFICANT CHANGE UP (ref 2–14)
NEUTROPHILS # BLD AUTO: 9 K/UL — HIGH (ref 1.8–7.4)
NEUTROPHILS NFR BLD AUTO: 73.7 % — SIGNIFICANT CHANGE UP (ref 43–77)
PLATELET # BLD AUTO: 430 K/UL — HIGH (ref 150–400)
POTASSIUM SERPL-MCNC: 4.1 MMOL/L — SIGNIFICANT CHANGE UP (ref 3.5–5.3)
POTASSIUM SERPL-SCNC: 4.1 MMOL/L — SIGNIFICANT CHANGE UP (ref 3.5–5.3)
RBC # BLD: 3.23 M/UL — LOW (ref 3.8–5.2)
RBC # FLD: 16.6 % — HIGH (ref 10.3–14.5)
SODIUM SERPL-SCNC: 140 MMOL/L — SIGNIFICANT CHANGE UP (ref 135–145)
WBC # BLD: 12.2 K/UL — HIGH (ref 3.8–10.5)
WBC # FLD AUTO: 12.2 K/UL — HIGH (ref 3.8–10.5)

## 2018-09-01 RX ADMIN — PANTOPRAZOLE SODIUM 40 MILLIGRAM(S): 20 TABLET, DELAYED RELEASE ORAL at 17:27

## 2018-09-01 RX ADMIN — AMPICILLIN SODIUM AND SULBACTAM SODIUM 100 GRAM(S): 250; 125 INJECTION, POWDER, FOR SUSPENSION INTRAMUSCULAR; INTRAVENOUS at 12:39

## 2018-09-01 RX ADMIN — DONEPEZIL HYDROCHLORIDE 10 MILLIGRAM(S): 10 TABLET, FILM COATED ORAL at 22:54

## 2018-09-01 RX ADMIN — AMPICILLIN SODIUM AND SULBACTAM SODIUM 100 GRAM(S): 250; 125 INJECTION, POWDER, FOR SUSPENSION INTRAMUSCULAR; INTRAVENOUS at 23:14

## 2018-09-01 RX ADMIN — AMPICILLIN SODIUM AND SULBACTAM SODIUM 100 GRAM(S): 250; 125 INJECTION, POWDER, FOR SUSPENSION INTRAMUSCULAR; INTRAVENOUS at 00:18

## 2018-09-01 RX ADMIN — AMPICILLIN SODIUM AND SULBACTAM SODIUM 100 GRAM(S): 250; 125 INJECTION, POWDER, FOR SUSPENSION INTRAMUSCULAR; INTRAVENOUS at 17:27

## 2018-09-01 RX ADMIN — AMPICILLIN SODIUM AND SULBACTAM SODIUM 100 GRAM(S): 250; 125 INJECTION, POWDER, FOR SUSPENSION INTRAMUSCULAR; INTRAVENOUS at 07:00

## 2018-09-01 RX ADMIN — PANTOPRAZOLE SODIUM 40 MILLIGRAM(S): 20 TABLET, DELAYED RELEASE ORAL at 06:59

## 2018-09-01 NOTE — PROGRESS NOTE ADULT - SUBJECTIVE AND OBJECTIVE BOX
HPI:  89yo female, from NH, AAOx1, with PMHx Dementia, HTN, DM, HLD, Iron deficiency anemia, Urinary Incontinence, sent from NH for Hb: 5. Patient is very demented and unable to give further details. Resting comfortably in bed, no abdominal pain, N/V/ hematochezia or hematemesis. Grandson at bedside, refers that patient has been having dark bowel movements for a couple of months back, before being admitted to NH for rehab. Patient was recently admitted on July 30th for syncopal episode, found to be anemic (Hb: 7.1), FOBT (+), underwent EGD which was negative for active bleeding, found to have bazoar in stomach. 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 GOC discussed at length with grandson: Yousuf. Patient remains FULL CODE. He wants everything to be done for the patient, "until she tells me to stop". (23 Aug 2018 17:03)      Patient is a 89y old  Female who presents with a chief complaint of anemia (25 Aug 2018 16:47)      INTERVAL HPI/OVERNIGHT EVENTS:  T(C): 36.6 (09-01-18 @ 14:26), Max: 36.9 (09-01-18 @ 05:32)  HR: 90 (09-01-18 @ 14:26) (83 - 99)  BP: 138/71 (09-01-18 @ 14:26) (130/52 - 138/71)  RR: 16 (09-01-18 @ 14:26) (16 - 16)  SpO2: 100% (09-01-18 @ 14:26) (99% - 100%)  Wt(kg): --  I&O's Summary      REVIEW OF SYSTEMS: denies fever, chills, SOB, palpitations, chest pain, abdominal pain, nausea, vomitting, diarrhea, constipation, dizziness    MEDICATIONS  (STANDING):  ampicillin/sulbactam  IVPB 1.5 Gram(s) IV Intermittent every 6 hours  donepezil 10 milliGRAM(s) Oral at bedtime  furosemide   Injectable 20 milliGRAM(s) IV Push once  pantoprazole  Injectable 40 milliGRAM(s) IV Push two times a day    MEDICATIONS  (PRN):  acetaminophen    Suspension. 650 milliGRAM(s) Oral every 6 hours PRN Moderate Pain (4 - 6)  glucagon  Injectable 1 milliGRAM(s) IntraMuscular once PRN Glucose LESS THAN 70 milligrams/deciliter      PHYSICAL EXAM:  GENERAL: NAD, well-groomed, well-developed  HEAD:  Atraumatic, Normocephalic  EYES: EOMI, PERRLA, conjunctiva and sclera clear  ENMT: No tonsillar erythema, exudates, or enlargement; Moist mucous membranes, Good dentition, No lesions  NECK: Supple, No JVD, Normal thyroid  NERVOUS SYSTEM:  Alert & Oriented X3, Good concentration; Motor Strength 5/5 B/L upper and lower extremities; DTRs 2+ intact and symmetric  CHEST/LUNG: Clear to percussion bilaterally; No rales, rhonchi, wheezing, or rubs  HEART: Regular rate and rhythm; No murmurs, rubs, or gallops  ABDOMEN: Soft, Nontender, Nondistended; Bowel sounds present  EXTREMITIES:  2+ Peripheral Pulses, No clubbing, cyanosis, or edema  LYMPH: No lymphadenopathy noted  SKIN: No rashes or lesions  LABS:                        9.0    12.2  )-----------( 430      ( 01 Sep 2018 06:10 )             28.6     09-01    140  |  105  |  16  ----------------------------<  93  4.1   |  26  |  0.96    Ca    8.8      01 Sep 2018 06:10  Phos  3.6     08-31  Mg     1.7     08-31          CAPILLARY BLOOD GLUCOSE      POCT Blood Glucose.: 112 mg/dL (01 Sep 2018 11:47)  POCT Blood Glucose.: 113 mg/dL (01 Sep 2018 08:28)  POCT Blood Glucose.: 132 mg/dL (31 Aug 2018 22:21)  POCT Blood Glucose.: 147 mg/dL (31 Aug 2018 17:42)      ABG - ( 31 Aug 2018 13:20 )  pH, Arterial: 7.44  pH, Blood: x     /  pCO2: 35    /  pO2: 89    / HCO3: 24    / Base Excess: 0.4   /  SaO2: 98

## 2018-09-02 LAB
ANION GAP SERPL CALC-SCNC: 10 MMOL/L — SIGNIFICANT CHANGE UP (ref 5–17)
BASOPHILS # BLD AUTO: 0.1 K/UL — SIGNIFICANT CHANGE UP (ref 0–0.2)
BASOPHILS NFR BLD AUTO: 1.2 % — SIGNIFICANT CHANGE UP (ref 0–2)
BUN SERPL-MCNC: 14 MG/DL — SIGNIFICANT CHANGE UP (ref 7–18)
CALCIUM SERPL-MCNC: 8.9 MG/DL — SIGNIFICANT CHANGE UP (ref 8.4–10.5)
CHLORIDE SERPL-SCNC: 104 MMOL/L — SIGNIFICANT CHANGE UP (ref 96–108)
CO2 SERPL-SCNC: 24 MMOL/L — SIGNIFICANT CHANGE UP (ref 22–31)
CREAT SERPL-MCNC: 0.98 MG/DL — SIGNIFICANT CHANGE UP (ref 0.5–1.3)
EOSINOPHIL # BLD AUTO: 0.1 K/UL — SIGNIFICANT CHANGE UP (ref 0–0.5)
EOSINOPHIL NFR BLD AUTO: 0.5 % — SIGNIFICANT CHANGE UP (ref 0–6)
GLUCOSE BLDC GLUCOMTR-MCNC: 137 MG/DL — HIGH (ref 70–99)
GLUCOSE SERPL-MCNC: 124 MG/DL — HIGH (ref 70–99)
HCT VFR BLD CALC: 27.6 % — LOW (ref 34.5–45)
HGB BLD-MCNC: 8.6 G/DL — LOW (ref 11.5–15.5)
LYMPHOCYTES # BLD AUTO: 1.9 K/UL — SIGNIFICANT CHANGE UP (ref 1–3.3)
LYMPHOCYTES # BLD AUTO: 15.4 % — SIGNIFICANT CHANGE UP (ref 13–44)
MAGNESIUM SERPL-MCNC: 1.8 MG/DL — SIGNIFICANT CHANGE UP (ref 1.6–2.6)
MCHC RBC-ENTMCNC: 27.7 PG — SIGNIFICANT CHANGE UP (ref 27–34)
MCHC RBC-ENTMCNC: 31.3 GM/DL — LOW (ref 32–36)
MCV RBC AUTO: 88.4 FL — SIGNIFICANT CHANGE UP (ref 80–100)
MONOCYTES # BLD AUTO: 1.2 K/UL — HIGH (ref 0–0.9)
MONOCYTES NFR BLD AUTO: 10 % — SIGNIFICANT CHANGE UP (ref 2–14)
NEUTROPHILS # BLD AUTO: 8.9 K/UL — HIGH (ref 1.8–7.4)
NEUTROPHILS NFR BLD AUTO: 73 % — SIGNIFICANT CHANGE UP (ref 43–77)
PHOSPHATE SERPL-MCNC: 3.4 MG/DL — SIGNIFICANT CHANGE UP (ref 2.5–4.5)
PLATELET # BLD AUTO: 472 K/UL — HIGH (ref 150–400)
POTASSIUM SERPL-MCNC: 4.3 MMOL/L — SIGNIFICANT CHANGE UP (ref 3.5–5.3)
POTASSIUM SERPL-SCNC: 4.3 MMOL/L — SIGNIFICANT CHANGE UP (ref 3.5–5.3)
RBC # BLD: 3.12 M/UL — LOW (ref 3.8–5.2)
RBC # FLD: 15.8 % — HIGH (ref 10.3–14.5)
SODIUM SERPL-SCNC: 138 MMOL/L — SIGNIFICANT CHANGE UP (ref 135–145)
WBC # BLD: 12.2 K/UL — HIGH (ref 3.8–10.5)
WBC # FLD AUTO: 12.2 K/UL — HIGH (ref 3.8–10.5)

## 2018-09-02 RX ADMIN — Medication 650 MILLIGRAM(S): at 13:00

## 2018-09-02 RX ADMIN — Medication 650 MILLIGRAM(S): at 21:20

## 2018-09-02 RX ADMIN — AMPICILLIN SODIUM AND SULBACTAM SODIUM 100 GRAM(S): 250; 125 INJECTION, POWDER, FOR SUSPENSION INTRAMUSCULAR; INTRAVENOUS at 23:13

## 2018-09-02 RX ADMIN — DONEPEZIL HYDROCHLORIDE 10 MILLIGRAM(S): 10 TABLET, FILM COATED ORAL at 23:13

## 2018-09-02 RX ADMIN — AMPICILLIN SODIUM AND SULBACTAM SODIUM 100 GRAM(S): 250; 125 INJECTION, POWDER, FOR SUSPENSION INTRAMUSCULAR; INTRAVENOUS at 12:02

## 2018-09-02 RX ADMIN — Medication 650 MILLIGRAM(S): at 12:02

## 2018-09-02 RX ADMIN — PANTOPRAZOLE SODIUM 40 MILLIGRAM(S): 20 TABLET, DELAYED RELEASE ORAL at 05:25

## 2018-09-02 RX ADMIN — Medication 650 MILLIGRAM(S): at 20:25

## 2018-09-02 RX ADMIN — PANTOPRAZOLE SODIUM 40 MILLIGRAM(S): 20 TABLET, DELAYED RELEASE ORAL at 18:21

## 2018-09-02 RX ADMIN — AMPICILLIN SODIUM AND SULBACTAM SODIUM 100 GRAM(S): 250; 125 INJECTION, POWDER, FOR SUSPENSION INTRAMUSCULAR; INTRAVENOUS at 18:21

## 2018-09-02 RX ADMIN — AMPICILLIN SODIUM AND SULBACTAM SODIUM 100 GRAM(S): 250; 125 INJECTION, POWDER, FOR SUSPENSION INTRAMUSCULAR; INTRAVENOUS at 05:25

## 2018-09-02 NOTE — PROGRESS NOTE ADULT - PROBLEM SELECTOR PLAN 1
Patient not having active bowel movements at the moment, not vomiting, asymptomatic.   EGD August 2018: no signs of active GI bleed. Colonoscopy performed 8/27 showed duodenal and cecal ulcers. AMS on past Friday. CTH negative for acute pathology. WBC was trending up. Left upper extremity cellulitis thought of as source of infection.  -Palliative Zoey NP on board  -DNR/DNI instated by HCP  -H/H stable at 8.6/27.6 as of 9/2  -WBC trending down, now 12.2 as of 9/2

## 2018-09-02 NOTE — PROGRESS NOTE ADULT - SUBJECTIVE AND OBJECTIVE BOX
PGY 1 Note discussed with supervising resident and primary attending    Patient is a 89y old  Female who presents with a chief complaint of anemia (25 Aug 2018 16:47)      INTERVAL HPI/OVERNIGHT EVENTS: Patient's mental status improved since Friday; answers questions, follows commands; remains afebrile and hemodynamically stable; H/H stable at 8.6/27.6; WBC trending down    MEDICATIONS  (STANDING):  ampicillin/sulbactam  IVPB 1.5 Gram(s) IV Intermittent every 6 hours  donepezil 10 milliGRAM(s) Oral at bedtime  furosemide   Injectable 20 milliGRAM(s) IV Push once  pantoprazole  Injectable 40 milliGRAM(s) IV Push two times a day    MEDICATIONS  (PRN):  acetaminophen    Suspension. 650 milliGRAM(s) Oral every 6 hours PRN Moderate Pain (4 - 6)  glucagon  Injectable 1 milliGRAM(s) IntraMuscular once PRN Glucose LESS THAN 70 milligrams/deciliter      __________________________________________________  REVIEW OF SYSTEMS:    CONSTITUTIONAL: No fever,   NECK: No pain or stiffness  RESPIRATORY: No cough;   CARDIOVASCULAR: No chest pain, no palpitations  GASTROINTESTINAL: No nausea or vomiting; No diarrhea   NEUROLOGICAL: No headache or numbness, no tremors      Vital Signs Last 24 Hrs  T(C): 36.7 (02 Sep 2018 05:58), Max: 37 (01 Sep 2018 21:46)  T(F): 98 (02 Sep 2018 05:58), Max: 98.6 (01 Sep 2018 21:46)  HR: 110 (02 Sep 2018 05:58) (90 - 110)  BP: 131/72 (02 Sep 2018 05:58) (131/72 - 138/71)  BP(mean): --  RR: 16 (02 Sep 2018 05:58) (16 - 16)  SpO2: 100% (02 Sep 2018 05:58) (100% - 100%)    ________________________________________________  PHYSICAL EXAM:    GENERAL: NAD  HEENT: Normocephalic;  conjunctivae and sclerae clear; moist mucous membranes;   NECK : supple  CHEST/LUNG: Clear to auscultation bilaterally with good air entry   HEART: S1 S2  regular; no murmurs, gallops or rubs  ABDOMEN: Soft, obese, Nontender, slightly distended; Bowel sounds present  EXTREMITIES: edemas present in UE b/l  NERVOUS SYSTEM:  appears more lethargic    _________________________________________________  LABS:                        8.6    12.2  )-----------( 472      ( 02 Sep 2018 07:26 )             27.6     09-02    138  |  104  |  14  ----------------------------<  124<H>  4.3   |  24  |  0.98    Ca    8.9      02 Sep 2018 07:26  Phos  3.4     09-02  Mg     1.8     09-02          CAPILLARY BLOOD GLUCOSE      POCT Blood Glucose.: 127 mg/dL (01 Sep 2018 21:59)  POCT Blood Glucose.: 162 mg/dL (01 Sep 2018 16:49)  POCT Blood Glucose.: 112 mg/dL (01 Sep 2018 11:47)        RADIOLOGY & ADDITIONAL TESTS:    Imaging Personally Reviewed:  YES    Consultant(s) Notes Reviewed:   YES    Care Discussed with Consultants :     Plan of care was discussed with patient and /or primary care giver; all questions and concerns were addressed and care was aligned with patient's wishes.

## 2018-09-02 NOTE — PROGRESS NOTE ADULT - SUBJECTIVE AND OBJECTIVE BOX
_________________________________________________________________________________________  ========>>  M E D I C A L   A T T E N D I N G  (covering today)  F O L L O W  U P  N O T E  <<=========  -----------------------------------------------------------------------------------------------------    - Patient seen and examined by me approximately thirty minutes ago.  - Patient today overall doing ok, comfortable, no new events reported     ==================>> REVIEW OF SYSTEM <<=================    ( limited ROS as pt is poor historian and disoriented)   denies pain or discomfort     ==================>> PHYSICAL EXAM <<=================    GEN: awake and alert, NAD , comfortable  HEENT: NCAT, PERRL, MMM, hearing intact, temporal wasting   Neck: supple , no JVD  CVS: S1S2 , regular , No M/R/G appreciated  PULM: CTA B/L,  no W/R/R appreciated  ABD.: soft. non tender, non distended,  bowel sounds present  Extrem: intact pulses , no edema      + ecchymotic area on left arm  PSYCH : normal mood      ==================>> MEDICATIONS <<====================    MEDICATIONS  (STANDING):  ampicillin/sulbactam  IVPB 1.5 Gram(s) IV Intermittent every 6 hours  donepezil 10 milliGRAM(s) Oral at bedtime  furosemide   Injectable 20 milliGRAM(s) IV Push once  pantoprazole  Injectable 40 milliGRAM(s) IV Push two times a day    MEDICATIONS  (PRN):  acetaminophen    Suspension. 650 milliGRAM(s) Oral every 6 hours PRN Moderate Pain (4 - 6)  glucagon  Injectable 1 milliGRAM(s) IntraMuscular once PRN Glucose LESS THAN 70 milligrams/deciliter    ==================>> VITAL SIGNS <<==================    T(C): 36.7 (09-02-18 @ 05:58), Max: 37 (09-01-18 @ 21:46)  HR: 110 (09-02-18 @ 05:58) (90 - 110)  BP: 131/72 (09-02-18 @ 05:58) (131/72 - 138/71)  RR: 16 (09-02-18 @ 05:58) (16 - 16)  SpO2: 100% (09-02-18 @ 05:58) (100% - 100%)    POCT Blood Glucose.: 137 mg/dL (02 Sep 2018 08:30)  POCT Blood Glucose.: 127 mg/dL (01 Sep 2018 21:59)  POCT Blood Glucose.: 162 mg/dL (01 Sep 2018 16:49)  POCT Blood Glucose.: 112 mg/dL (01 Sep 2018 11:47)     ==================>> LAB AND IMAGING <<==================                        8.6    12.2  )-----------( 472      ( 02 Sep 2018 07:26 )             27.6        WBC count:   12.2 <<== ,  12.2 <<== ,  13.0 <<== ,  13.1 <<== ,  15.0 <<== ,  13.4 <<==     Hemoglobin:   8.6 <<==,  9.0 <<==,  9.3 <<==,  8.9 <<==,  9.2 <<==,  8.8 <<==    138  |  104  |  14  ----------------------------<  124<H>  4.3   |  24  |  0.98    Ca    8.9      02 Sep 2018 07:26  Phos  3.4     09-02  Mg     1.8     09-02              ABG - ( 31 Aug 2018 13:20 )  pH, Arterial: 7.44  pH, Blood: x     /  pCO2: 35    /  pO2: 89    / HCO3: 24    / Base Excess: 0.4   /  SaO2: 98           TSH:      0.52   (08-26-18)       ,     1.31   (07-31-18)           HgA1C: 5.7  (08-24-18)        , 5.7  (07-31-18)            ___________________________________________________________________________________  ===============>>  A S S E S S M E N T   A N D   P L A N <<===============  ------------------------------------------------------------------------------------------    · Assessment		  89yo female, from NH, AAOx1, with PMHx Dementia, HTN, DM, HLD, Iron deficiency anemia, Urinary Incontinence, sent from NH for Hb: 5. Patient is very demented and unable to give further details. Resting comfortably in bed, no abdominal pain, N/V/ hematochezia or hematemesis.     Problem/Plan - 1:  ·  Problem: GIB   no further bleeding noted   EGD August 2018: no signs of active GI bleed. Colonoscopy performed 8/27 showed duodenal and cecal ulcers. AMS on past Friday. CTH negative for acute pathology. WBC was trending up. Left upper extremity cellulitis thought of as source of infection.  -Palliative Zoey NP on board  -DNR/DNI instated by HCP  -H/H stable at 8.6/27.6 as of 9/2  -WBC trending down, now 12.2 as of 9/2.   - GI follow up as needed   - encourage Po intake     Problem/Plan - 2:  ·  Problem: Dementia.    c/w Aricept home dose    Problem/Plan - 3:  ·  Problem: Diabetes mellitus.  Plan: Patient on Metformin 500 mg QD and HiSS at NH  HbA1C: 5.7%  c/w HISS  FS q6hrs as NPO.     Problem/Plan - 4:  ·  Problem: Hypertension, unspecified type.  Plan: Will hold off from antihypertensives in setting of GI bleed  Monitor vitals closely.       unsure why pt on unasyn>> to be re-evaluated by primary team    no signs of cellulitis actively seen     -GI/DVT Prophylaxis.    __________________________  H. LEXY Roche.   (covering today)  Pager: [phone]

## 2018-09-03 LAB
ANION GAP SERPL CALC-SCNC: 8 MMOL/L — SIGNIFICANT CHANGE UP (ref 5–17)
BASOPHILS # BLD AUTO: 0.1 K/UL — SIGNIFICANT CHANGE UP (ref 0–0.2)
BASOPHILS # BLD AUTO: 0.1 K/UL — SIGNIFICANT CHANGE UP (ref 0–0.2)
BASOPHILS NFR BLD AUTO: 0.7 % — SIGNIFICANT CHANGE UP (ref 0–2)
BASOPHILS NFR BLD AUTO: 0.9 % — SIGNIFICANT CHANGE UP (ref 0–2)
BUN SERPL-MCNC: 12 MG/DL — SIGNIFICANT CHANGE UP (ref 7–18)
CALCIUM SERPL-MCNC: 8.8 MG/DL — SIGNIFICANT CHANGE UP (ref 8.4–10.5)
CHLORIDE SERPL-SCNC: 104 MMOL/L — SIGNIFICANT CHANGE UP (ref 96–108)
CO2 SERPL-SCNC: 26 MMOL/L — SIGNIFICANT CHANGE UP (ref 22–31)
CREAT SERPL-MCNC: 0.96 MG/DL — SIGNIFICANT CHANGE UP (ref 0.5–1.3)
EOSINOPHIL # BLD AUTO: 0.1 K/UL — SIGNIFICANT CHANGE UP (ref 0–0.5)
EOSINOPHIL # BLD AUTO: 0.1 K/UL — SIGNIFICANT CHANGE UP (ref 0–0.5)
EOSINOPHIL NFR BLD AUTO: 1 % — SIGNIFICANT CHANGE UP (ref 0–6)
EOSINOPHIL NFR BLD AUTO: 1.3 % — SIGNIFICANT CHANGE UP (ref 0–6)
GLUCOSE BLDC GLUCOMTR-MCNC: 111 MG/DL — HIGH (ref 70–99)
GLUCOSE BLDC GLUCOMTR-MCNC: 112 MG/DL — HIGH (ref 70–99)
GLUCOSE BLDC GLUCOMTR-MCNC: 116 MG/DL — HIGH (ref 70–99)
GLUCOSE BLDC GLUCOMTR-MCNC: 165 MG/DL — HIGH (ref 70–99)
GLUCOSE SERPL-MCNC: 105 MG/DL — HIGH (ref 70–99)
HCT VFR BLD CALC: 27 % — LOW (ref 34.5–45)
HCT VFR BLD CALC: 28.3 % — LOW (ref 34.5–45)
HGB BLD-MCNC: 8.4 G/DL — LOW (ref 11.5–15.5)
HGB BLD-MCNC: 8.7 G/DL — LOW (ref 11.5–15.5)
LYMPHOCYTES # BLD AUTO: 1.5 K/UL — SIGNIFICANT CHANGE UP (ref 1–3.3)
LYMPHOCYTES # BLD AUTO: 1.8 K/UL — SIGNIFICANT CHANGE UP (ref 1–3.3)
LYMPHOCYTES # BLD AUTO: 15.4 % — SIGNIFICANT CHANGE UP (ref 13–44)
LYMPHOCYTES # BLD AUTO: 19.1 % — SIGNIFICANT CHANGE UP (ref 13–44)
MCHC RBC-ENTMCNC: 27.4 PG — SIGNIFICANT CHANGE UP (ref 27–34)
MCHC RBC-ENTMCNC: 27.5 PG — SIGNIFICANT CHANGE UP (ref 27–34)
MCHC RBC-ENTMCNC: 30.9 GM/DL — LOW (ref 32–36)
MCHC RBC-ENTMCNC: 31.1 GM/DL — LOW (ref 32–36)
MCV RBC AUTO: 88.3 FL — SIGNIFICANT CHANGE UP (ref 80–100)
MCV RBC AUTO: 88.7 FL — SIGNIFICANT CHANGE UP (ref 80–100)
MONOCYTES # BLD AUTO: 1 K/UL — HIGH (ref 0–0.9)
MONOCYTES # BLD AUTO: 1 K/UL — HIGH (ref 0–0.9)
MONOCYTES NFR BLD AUTO: 10.3 % — SIGNIFICANT CHANGE UP (ref 2–14)
MONOCYTES NFR BLD AUTO: 10.9 % — SIGNIFICANT CHANGE UP (ref 2–14)
NEUTROPHILS # BLD AUTO: 6.4 K/UL — SIGNIFICANT CHANGE UP (ref 1.8–7.4)
NEUTROPHILS # BLD AUTO: 7 K/UL — SIGNIFICANT CHANGE UP (ref 1.8–7.4)
NEUTROPHILS NFR BLD AUTO: 67.7 % — SIGNIFICANT CHANGE UP (ref 43–77)
NEUTROPHILS NFR BLD AUTO: 72.6 % — SIGNIFICANT CHANGE UP (ref 43–77)
PLATELET # BLD AUTO: 473 K/UL — HIGH (ref 150–400)
PLATELET # BLD AUTO: 509 K/UL — HIGH (ref 150–400)
POTASSIUM SERPL-MCNC: 4.2 MMOL/L — SIGNIFICANT CHANGE UP (ref 3.5–5.3)
POTASSIUM SERPL-SCNC: 4.2 MMOL/L — SIGNIFICANT CHANGE UP (ref 3.5–5.3)
RBC # BLD: 3.06 M/UL — LOW (ref 3.8–5.2)
RBC # BLD: 3.19 M/UL — LOW (ref 3.8–5.2)
RBC # FLD: 16.3 % — HIGH (ref 10.3–14.5)
RBC # FLD: 16.4 % — HIGH (ref 10.3–14.5)
SODIUM SERPL-SCNC: 138 MMOL/L — SIGNIFICANT CHANGE UP (ref 135–145)
WBC # BLD: 9.4 K/UL — SIGNIFICANT CHANGE UP (ref 3.8–10.5)
WBC # BLD: 9.6 K/UL — SIGNIFICANT CHANGE UP (ref 3.8–10.5)
WBC # FLD AUTO: 9.4 K/UL — SIGNIFICANT CHANGE UP (ref 3.8–10.5)
WBC # FLD AUTO: 9.6 K/UL — SIGNIFICANT CHANGE UP (ref 3.8–10.5)

## 2018-09-03 RX ORDER — AMPICILLIN SODIUM AND SULBACTAM SODIUM 250; 125 MG/ML; MG/ML
1.5 INJECTION, POWDER, FOR SUSPENSION INTRAMUSCULAR; INTRAVENOUS EVERY 6 HOURS
Qty: 0 | Refills: 0 | Status: DISCONTINUED | OUTPATIENT
Start: 2018-09-03 | End: 2018-09-04

## 2018-09-03 RX ADMIN — PANTOPRAZOLE SODIUM 40 MILLIGRAM(S): 20 TABLET, DELAYED RELEASE ORAL at 05:30

## 2018-09-03 RX ADMIN — Medication 650 MILLIGRAM(S): at 23:13

## 2018-09-03 RX ADMIN — Medication 650 MILLIGRAM(S): at 22:25

## 2018-09-03 RX ADMIN — AMPICILLIN SODIUM AND SULBACTAM SODIUM 100 GRAM(S): 250; 125 INJECTION, POWDER, FOR SUSPENSION INTRAMUSCULAR; INTRAVENOUS at 05:30

## 2018-09-03 RX ADMIN — DONEPEZIL HYDROCHLORIDE 10 MILLIGRAM(S): 10 TABLET, FILM COATED ORAL at 22:11

## 2018-09-03 RX ADMIN — AMPICILLIN SODIUM AND SULBACTAM SODIUM 100 GRAM(S): 250; 125 INJECTION, POWDER, FOR SUSPENSION INTRAMUSCULAR; INTRAVENOUS at 17:52

## 2018-09-03 RX ADMIN — PANTOPRAZOLE SODIUM 40 MILLIGRAM(S): 20 TABLET, DELAYED RELEASE ORAL at 17:52

## 2018-09-03 NOTE — PROGRESS NOTE ADULT - SUBJECTIVE AND OBJECTIVE BOX
HPI:  89yo female, from NH, AAOx1, with PMHx Dementia, HTN, DM, HLD, Iron deficiency anemia, Urinary Incontinence, sent from NH for Hb: 5. Patient is very demented and unable to give further details. Resting comfortably in bed, no abdominal pain, N/V/ hematochezia or hematemesis. Grandson at bedside, refers that patient has been having dark bowel movements for a couple of months back, before being admitted to NH for rehab. Patient was recently admitted on July 30th for syncopal episode, found to be anemic (Hb: 7.1), FOBT (+), underwent EGD which was negative for active bleeding, found to have bazoar in stomach. 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 GOC discussed at length with grandson: Yousuf. Patient remains FULL CODE. He wants everything to be done for the patient, "until she tells me to stop". (23 Aug 2018 17:03)      Patient is a 89y old  Female who presents with a chief complaint of anemia (25 Aug 2018 16:47)      INTERVAL HPI/OVERNIGHT EVENTS:  T(C): 36.8 (09-03-18 @ 05:30), Max: 36.8 (09-03-18 @ 05:30)  HR: 100 (09-03-18 @ 05:30) (84 - 100)  BP: 146/78 (09-03-18 @ 05:30) (143/71 - 146/78)  RR: 17 (09-03-18 @ 05:30) (16 - 17)  SpO2: 100% (09-03-18 @ 05:30) (100% - 100%)  Wt(kg): --  I&O's Summary      REVIEW OF SYSTEMS: denies fever, chills, SOB, palpitations, chest pain, abdominal pain, nausea, vomitting, diarrhea, constipation, dizziness    MEDICATIONS  (STANDING):  ampicillin/sulbactam  IVPB 1.5 Gram(s) IV Intermittent every 6 hours  donepezil 10 milliGRAM(s) Oral at bedtime  furosemide   Injectable 20 milliGRAM(s) IV Push once  pantoprazole  Injectable 40 milliGRAM(s) IV Push two times a day    MEDICATIONS  (PRN):  acetaminophen    Suspension. 650 milliGRAM(s) Oral every 6 hours PRN Moderate Pain (4 - 6)  glucagon  Injectable 1 milliGRAM(s) IntraMuscular once PRN Glucose LESS THAN 70 milligrams/deciliter      PHYSICAL EXAM:  GENERAL: NAD, well-groomed, well-developed  HEAD:  Atraumatic, Normocephalic  EYES: EOMI, PERRLA, conjunctiva and sclera clear  ENMT: No tonsillar erythema, exudates, or enlargement; Moist mucous membranes, Good dentition, No lesions  NECK: Supple, No JVD, Normal thyroid  NERVOUS SYSTEM:  Alert & Oriented X3, Good concentration; Motor Strength 5/5 B/L upper and lower extremities; DTRs 2+ intact and symmetric  CHEST/LUNG: Clear to percussion bilaterally; No rales, rhonchi, wheezing, or rubs  HEART: Regular rate and rhythm; No murmurs, rubs, or gallops  ABDOMEN: Soft, Nontender, Nondistended; Bowel sounds present  EXTREMITIES:  2+ Peripheral Pulses, No clubbing, cyanosis, or edema  LYMPH: No lymphadenopathy noted  SKIN: No rashes or lesions  LABS:                        8.7    9.6   )-----------( 509      ( 03 Sep 2018 10:30 )             28.3     09-03    138  |  104  |  12  ----------------------------<  105<H>  4.2   |  26  |  0.96    Ca    8.8      03 Sep 2018 06:31  Phos  3.4     09-02  Mg     1.8     09-02          CAPILLARY BLOOD GLUCOSE      POCT Blood Glucose.: 116 mg/dL (03 Sep 2018 13:04)  POCT Blood Glucose.: 111 mg/dL (03 Sep 2018 08:15)         see

## 2018-09-03 NOTE — PROGRESS NOTE ADULT - PROBLEM SELECTOR PLAN 1
Patient not having active bowel movements at the moment, not vomiting, asymptomatic. EGD August 2018: no signs of active GI bleed. Colonoscopy performed 8/27 showed duodenal and cecal ulcers. AMS on past Friday. CTH negative for acute pathology. WBC was trending up. Left upper extremity cellulitis thought of as source of infection. Palliative Zoey NP on board. DNR/DNI instated by HCP.  -H/H stable at 8.7/28.3 as of 9/3  -WBC trending down, now 9.6 WNL  -Abx d/c 9/3 as Lt arm swelling, warmth, redness, pain subsided

## 2018-09-04 DIAGNOSIS — E43 UNSPECIFIED SEVERE PROTEIN-CALORIE MALNUTRITION: ICD-10-CM

## 2018-09-04 LAB
ANION GAP SERPL CALC-SCNC: 8 MMOL/L — SIGNIFICANT CHANGE UP (ref 5–17)
BASOPHILS # BLD AUTO: 0.1 K/UL — SIGNIFICANT CHANGE UP (ref 0–0.2)
BASOPHILS NFR BLD AUTO: 0.7 % — SIGNIFICANT CHANGE UP (ref 0–2)
BUN SERPL-MCNC: 15 MG/DL — SIGNIFICANT CHANGE UP (ref 7–18)
CALCIUM SERPL-MCNC: 8.8 MG/DL — SIGNIFICANT CHANGE UP (ref 8.4–10.5)
CHLORIDE SERPL-SCNC: 104 MMOL/L — SIGNIFICANT CHANGE UP (ref 96–108)
CO2 SERPL-SCNC: 26 MMOL/L — SIGNIFICANT CHANGE UP (ref 22–31)
CREAT SERPL-MCNC: 1.14 MG/DL — SIGNIFICANT CHANGE UP (ref 0.5–1.3)
CULTURE RESULTS: SIGNIFICANT CHANGE UP
EOSINOPHIL # BLD AUTO: 0.1 K/UL — SIGNIFICANT CHANGE UP (ref 0–0.5)
EOSINOPHIL NFR BLD AUTO: 0.9 % — SIGNIFICANT CHANGE UP (ref 0–6)
GLUCOSE BLDC GLUCOMTR-MCNC: 144 MG/DL — HIGH (ref 70–99)
GLUCOSE SERPL-MCNC: 123 MG/DL — HIGH (ref 70–99)
HCT VFR BLD CALC: 27.9 % — LOW (ref 34.5–45)
HGB BLD-MCNC: 8.6 G/DL — LOW (ref 11.5–15.5)
LYMPHOCYTES # BLD AUTO: 1.7 K/UL — SIGNIFICANT CHANGE UP (ref 1–3.3)
LYMPHOCYTES # BLD AUTO: 15.7 % — SIGNIFICANT CHANGE UP (ref 13–44)
MCHC RBC-ENTMCNC: 27.2 PG — SIGNIFICANT CHANGE UP (ref 27–34)
MCHC RBC-ENTMCNC: 30.7 GM/DL — LOW (ref 32–36)
MCV RBC AUTO: 88.6 FL — SIGNIFICANT CHANGE UP (ref 80–100)
MONOCYTES # BLD AUTO: 1 K/UL — HIGH (ref 0–0.9)
MONOCYTES NFR BLD AUTO: 9.3 % — SIGNIFICANT CHANGE UP (ref 2–14)
NEUTROPHILS # BLD AUTO: 8.1 K/UL — HIGH (ref 1.8–7.4)
NEUTROPHILS NFR BLD AUTO: 73.4 % — SIGNIFICANT CHANGE UP (ref 43–77)
PLATELET # BLD AUTO: 519 K/UL — HIGH (ref 150–400)
POTASSIUM SERPL-MCNC: 4.2 MMOL/L — SIGNIFICANT CHANGE UP (ref 3.5–5.3)
POTASSIUM SERPL-SCNC: 4.2 MMOL/L — SIGNIFICANT CHANGE UP (ref 3.5–5.3)
RBC # BLD: 3.15 M/UL — LOW (ref 3.8–5.2)
RBC # FLD: 15.8 % — HIGH (ref 10.3–14.5)
SODIUM SERPL-SCNC: 138 MMOL/L — SIGNIFICANT CHANGE UP (ref 135–145)
SPECIMEN SOURCE: SIGNIFICANT CHANGE UP
WBC # BLD: 11.1 K/UL — HIGH (ref 3.8–10.5)
WBC # FLD AUTO: 11.1 K/UL — HIGH (ref 3.8–10.5)

## 2018-09-04 PROCEDURE — 99497 ADVNCD CARE PLAN 30 MIN: CPT

## 2018-09-04 PROCEDURE — 99233 SBSQ HOSP IP/OBS HIGH 50: CPT

## 2018-09-04 RX ORDER — QUINAPRIL HYDROCHLORIDE 40 MG/1
1 TABLET, FILM COATED ORAL
Qty: 0 | Refills: 0 | COMMUNITY

## 2018-09-04 RX ORDER — SOLIFENACIN SUCCINATE 10 MG/1
1 TABLET ORAL
Qty: 0 | Refills: 0 | COMMUNITY

## 2018-09-04 RX ADMIN — PANTOPRAZOLE SODIUM 40 MILLIGRAM(S): 20 TABLET, DELAYED RELEASE ORAL at 05:10

## 2018-09-04 RX ADMIN — Medication 650 MILLIGRAM(S): at 22:35

## 2018-09-04 RX ADMIN — PANTOPRAZOLE SODIUM 40 MILLIGRAM(S): 20 TABLET, DELAYED RELEASE ORAL at 18:17

## 2018-09-04 RX ADMIN — AMPICILLIN SODIUM AND SULBACTAM SODIUM 100 GRAM(S): 250; 125 INJECTION, POWDER, FOR SUSPENSION INTRAMUSCULAR; INTRAVENOUS at 01:01

## 2018-09-04 RX ADMIN — AMPICILLIN SODIUM AND SULBACTAM SODIUM 100 GRAM(S): 250; 125 INJECTION, POWDER, FOR SUSPENSION INTRAMUSCULAR; INTRAVENOUS at 13:12

## 2018-09-04 RX ADMIN — AMPICILLIN SODIUM AND SULBACTAM SODIUM 100 GRAM(S): 250; 125 INJECTION, POWDER, FOR SUSPENSION INTRAMUSCULAR; INTRAVENOUS at 18:17

## 2018-09-04 RX ADMIN — AMPICILLIN SODIUM AND SULBACTAM SODIUM 100 GRAM(S): 250; 125 INJECTION, POWDER, FOR SUSPENSION INTRAMUSCULAR; INTRAVENOUS at 05:10

## 2018-09-04 RX ADMIN — Medication 650 MILLIGRAM(S): at 21:35

## 2018-09-04 RX ADMIN — DONEPEZIL HYDROCHLORIDE 10 MILLIGRAM(S): 10 TABLET, FILM COATED ORAL at 21:35

## 2018-09-04 NOTE — PROGRESS NOTE ADULT - PROBLEM SELECTOR PLAN 1
FAST 7C. Per NH report, pt A&O x 2, nonambulatory, 2 person assist with ADLs, incontinent x 2, + variable PO intake at baseline. Pt requires 24 hr care. Spoke with patient's grandson/primary HCP, kirstin - discussed status and educated on disease trajectories. Patient was a full code; now DNR.

## 2018-09-04 NOTE — CHART NOTE - NSCHARTNOTEFT_GEN_A_CORE
Assessment:   89yFemalePatient is a 89y old  Female who presents with a chief complaint of anemia (25 Aug 2018 16:47)      Factors impacting intake: [ ] none [ ] nausea  [ ] vomiting [ ] diarrhea, loose stools [ ] constipation  [ ]chewing problems [ ] swallowing issues  [ x] other: per grandson, patient not finding liquid diet fulfilling. would like diet to be advanced to puree food instead. However, discussed with MD and would like to keep patient On full liquid diet now and supplemented with ensure .     Diet Presciption: Diet, Full Liquid:   Supplement Feeding Modality:  Oral  Ensure Enlive Cans or Servings Per Day:  1       Frequency:  Three Times a day (08-30-18 @ 09:58)    Intake: inadequate    Current Weight:   % Weight Change    Pertinent Medications: MEDICATIONS  (STANDING):  ampicillin/sulbactam  IVPB 1.5 Gram(s) IV Intermittent every 6 hours  donepezil 10 milliGRAM(s) Oral at bedtime  furosemide   Injectable 20 milliGRAM(s) IV Push once  pantoprazole  Injectable 40 milliGRAM(s) IV Push two times a day    MEDICATIONS  (PRN):  acetaminophen    Suspension. 650 milliGRAM(s) Oral every 6 hours PRN Moderate Pain (4 - 6)  glucagon  Injectable 1 milliGRAM(s) IntraMuscular once PRN Glucose LESS THAN 70 milligrams/deciliter    Pertinent Labs: 09-04 Na138 mmol/L Glu 123 mg/dL<H> K+ 4.2 mmol/L Cr  1.14 mg/dL BUN 15 mg/dL 09-02 Phos 3.4 mg/dL 08-30 Alb 1.8 g/dL<L> 08-24 [id number] 5.7 %<H>     CAPILLARY BLOOD GLUCOSE      POCT Blood Glucose.: 112 mg/dL (03 Sep 2018 21:43)  POCT Blood Glucose.: 165 mg/dL (03 Sep 2018 16:38)    Skin: No pressure ulcer     Estimated Needs:   [x ] no change since previous assessment  [ ] recalculated:     Previous Nutrition Diagnosis:   [ ] Inadequate Energy Intake [ ]Inadequate Oral Intake [ ] Excessive Energy Intake   [ ] Underweight [ ] Increased Nutrient Needs [ ] Overweight/Obesity   [ ] Altered GI Function [ ] Unintended Weight Loss [ ] Food & Nutrition Related Knowledge Deficit [ x] Malnutrition , severe     Nutrition Diagnosis is [ x] ongoing  [ ] resolved [ ] not applicable     New Nutrition Diagnosis: [ x] not applicable       Interventions:   Recommend  [ ] Change Diet To:  [ ] Nutrition Supplement  [ ] Nutrition Support  [ ] Other: continue with full liquid diet and ensure enlive tid per MD. No advancement at this time.     Monitoring and Evaluation:   [ x] PO intake [ x ] Tolerance to diet prescription [ x ] weights [ x ] labs[ x ] follow up per protocol  [ ] other: Assessment:   89yFemalePatient is a 89y old  Female who presents with a chief complaint of anemia (25 Aug 2018 16:47)      Factors impacting intake: [ ] none [ ] nausea  [ ] vomiting [ ] diarrhea, loose stools [ ] constipation  [ ]chewing problems [ ] swallowing issues  [ x] other: per grandson, patient not finding liquid diet fulfilling. would like diet to be advanced to puree food instead. However, discussed with MD and would like to keep patient On full liquid diet now and supplemented with ensure .     Diet Presciption: Diet, Full Liquid:   Supplement Feeding Modality:  Oral  Ensure Enlive Cans or Servings Per Day:  1       Frequency:  Three Times a day (08-30-18 @ 09:58)    Intake: inadequate    Current Weight: 71kg  % Weight change: 1% wt gain in 6 days    Pertinent Medications: MEDICATIONS  (STANDING):  ampicillin/sulbactam  IVPB 1.5 Gram(s) IV Intermittent every 6 hours  donepezil 10 milliGRAM(s) Oral at bedtime  furosemide   Injectable 20 milliGRAM(s) IV Push once  pantoprazole  Injectable 40 milliGRAM(s) IV Push two times a day    MEDICATIONS  (PRN):  acetaminophen    Suspension. 650 milliGRAM(s) Oral every 6 hours PRN Moderate Pain (4 - 6)  glucagon  Injectable 1 milliGRAM(s) IntraMuscular once PRN Glucose LESS THAN 70 milligrams/deciliter    Pertinent Labs: 09-04 Na138 mmol/L Glu 123 mg/dL<H> K+ 4.2 mmol/L Cr  1.14 mg/dL BUN 15 mg/dL 09-02 Phos 3.4 mg/dL 08-30 Alb 1.8 g/dL<L> 08-24 [id number] 5.7 %<H>     CAPILLARY BLOOD GLUCOSE      POCT Blood Glucose.: 112 mg/dL (03 Sep 2018 21:43)  POCT Blood Glucose.: 165 mg/dL (03 Sep 2018 16:38)    Skin: No pressure ulcer     Estimated Needs:   [x ] no change since previous assessment  [ ] recalculated:     Previous Nutrition Diagnosis:   [ ] Inadequate Energy Intake [ ]Inadequate Oral Intake [ ] Excessive Energy Intake   [ ] Underweight [ ] Increased Nutrient Needs [ ] Overweight/Obesity   [ ] Altered GI Function [ ] Unintended Weight Loss [ ] Food & Nutrition Related Knowledge Deficit [ x] Malnutrition , severe     Nutrition Diagnosis is [ x] ongoing  [ ] resolved [ ] not applicable     New Nutrition Diagnosis: [ x] not applicable       Interventions:   Recommend  [ ] Change Diet To:  [ ] Nutrition Supplement  [ ] Nutrition Support  [ ] Other: continue with full liquid diet and ensure enlive tid per MD. No advancement at this time.     Monitoring and Evaluation:   [ x] PO intake [ x ] Tolerance to diet prescription [ x ] weights [ x ] labs[ x ] follow up per protocol  [ ] other:

## 2018-09-04 NOTE — PROGRESS NOTE ADULT - SUBJECTIVE AND OBJECTIVE BOX
OVERNIGHT EVENTS: no acute events    Present Symptoms:   Dyspnea: none  Pain:  none  Review of Systems: Limited ROS 2/2 poor mentation      MEDICATIONS  (STANDING):  ampicillin/sulbactam  IVPB 1.5 Gram(s) IV Intermittent every 6 hours  donepezil 10 milliGRAM(s) Oral at bedtime  furosemide   Injectable 20 milliGRAM(s) IV Push once  pantoprazole  Injectable 40 milliGRAM(s) IV Push two times a day    MEDICATIONS  (PRN):  acetaminophen    Suspension. 650 milliGRAM(s) Oral every 6 hours PRN Moderate Pain (4 - 6)  glucagon  Injectable 1 milliGRAM(s) IntraMuscular once PRN Glucose LESS THAN 70 milligrams/deciliter      PHYSICAL EXAM:  Vital Signs Last 24 Hrs  T(C): 36.7 (04 Sep 2018 14:06), Max: 36.7 (04 Sep 2018 05:22)  T(F): 98 (04 Sep 2018 14:06), Max: 98 (04 Sep 2018 05:22)  HR: 96 (04 Sep 2018 14:06) (80 - 104)  BP: 125/57 (04 Sep 2018 14:06) (121/59 - 126/75)  BP(mean): --  RR: 18 (04 Sep 2018 14:06) (18 - 18)  SpO2: 99% (04 Sep 2018 14:06) (95% - 99%)  General: alert  oriented x 2, confused, minimally verbal  Karnofsky Performance Score/Palliative Performance Status Version2: 30%    HEENT: dry lips  Lungs: comfortable   CV:    tachycardia  GI:   incontinent  :  incontinent   Musculoskeletal: nonambulatory, assist with ADLs, +1 bilateral  strength  Neuro: cognitive impairment, dysphagia  Oral intake ability: unable/only mouth care   Diet: NPO    LABS:                          8.6    11.1  )-----------( 519      ( 04 Sep 2018 10:32 )             27.9     09-04    138  |  104  |  15  ----------------------------<  123<H>  4.2   |  26  |  1.14    Ca    8.8      04 Sep 2018 10:32          RADIOLOGY & ADDITIONAL STUDIES: reviewed    ADVANCE DIRECTIVES: DNR / MOLST forms completed as per HCP's wishes: DNR / DNI / send to hospital / no feeding tube / trial IV fluids / use abx; goal of care is comfort.   Advanced Care Planning discussion total time spent:  +30 minutes. Met with patient's HCP, Yousuf, face to face. Discussed status and again risks vs benefits of resuscitation, intubation, artificial nutrition. HCP acknowledges patient's advanced age and concern that pt would likely pull out feeding tube if placed. Reviewed MOLST and confirmed choices. All questions answered.

## 2018-09-04 NOTE — PROGRESS NOTE ADULT - SUBJECTIVE AND OBJECTIVE BOX
PGY 1 Note discussed with supervising resident and primary attending    Patient is a 89y old  Female who presents with a chief complaint of anemia (04 Sep 2018 17:37)      INTERVAL HPI/OVERNIGHT EVENTS:  No acute events overnight, remains afebrile, HD stable; A&Ox1, follows simple commands, answers questions    MEDICATIONS  (STANDING):  donepezil 10 milliGRAM(s) Oral at bedtime  furosemide   Injectable 20 milliGRAM(s) IV Push once  pantoprazole  Injectable 40 milliGRAM(s) IV Push two times a day    MEDICATIONS  (PRN):  acetaminophen    Suspension. 650 milliGRAM(s) Oral every 6 hours PRN Moderate Pain (4 - 6)  glucagon  Injectable 1 milliGRAM(s) IntraMuscular once PRN Glucose LESS THAN 70 milligrams/deciliter      __________________________________________________  REVIEW OF SYSTEMS:    CONSTITUTIONAL: No fever,   EYES: no acute visual disturbances  NECK: No pain or stiffness  RESPIRATORY: No cough; No shortness of breath  CARDIOVASCULAR: No chest pain, no palpitations  GASTROINTESTINAL: No pain. No nausea or vomiting; No diarrhea   NEUROLOGICAL: No headache or numbness, no tremors  MUSCULOSKELETAL: No joint pain, no muscle pain  GENITOURINARY: no dysuria, no frequency, no hesitancy  PSYCHIATRY: no depression , no anxiety  ALL OTHER  ROS negative        Vital Signs Last 24 Hrs  T(C): 36.7 (04 Sep 2018 14:06), Max: 36.7 (04 Sep 2018 05:22)  T(F): 98 (04 Sep 2018 14:06), Max: 98 (04 Sep 2018 05:22)  HR: 96 (04 Sep 2018 14:06) (80 - 104)  BP: 125/57 (04 Sep 2018 14:06) (121/59 - 126/75)  BP(mean): --  RR: 18 (04 Sep 2018 14:06) (18 - 18)  SpO2: 99% (04 Sep 2018 14:06) (95% - 99%)    ________________________________________________  PHYSICAL EXAM:  GENERAL: NAD  HEENT: Normocephalic;  conjunctivae and sclerae clear; moist mucous membranes;   NECK : supple  CHEST/LUNG: Clear to auscultation bilaterally with good air entry   HEART: S1 S2  regular; no murmurs, gallops or rubs  ABDOMEN: Soft, Nontender, Nondistended; Bowel sounds present  EXTREMITIES: no cyanosis; no edema; no calf tenderness  SKIN: warm and dry; no rash  NERVOUS SYSTEM:  Awake and alert; Oriented  to place, person and time ; no new deficits    _________________________________________________  LABS:                        8.6    11.1  )-----------( 519      ( 04 Sep 2018 10:32 )             27.9     09-04    138  |  104  |  15  ----------------------------<  123<H>  4.2   |  26  |  1.14    Ca    8.8      04 Sep 2018 10:32          CAPILLARY BLOOD GLUCOSE      POCT Blood Glucose.: 144 mg/dL (04 Sep 2018 16:54)  POCT Blood Glucose.: 112 mg/dL (03 Sep 2018 21:43)        RADIOLOGY & ADDITIONAL TESTS:    Imaging Personally Reviewed:  YES    Consultant(s) Notes Reviewed:   YES    Care Discussed with Consultants :     Plan of care was discussed with patient and /or primary care giver; all questions and concerns were addressed and care was aligned with patient's wishes.

## 2018-09-04 NOTE — PROGRESS NOTE ADULT - PROBLEM SELECTOR PLAN 1
-H/H stable, wBC WNL  -No new episodes of bleeding noted  -Patient started on puree diet (comfort feeding) per son (HCP)'s request, tolerating well so far  -Plan for d/c tomorrow

## 2018-09-05 LAB
ANION GAP SERPL CALC-SCNC: 7 MMOL/L — SIGNIFICANT CHANGE UP (ref 5–17)
BASOPHILS # BLD AUTO: 0.1 K/UL — SIGNIFICANT CHANGE UP (ref 0–0.2)
BASOPHILS NFR BLD AUTO: 0.7 % — SIGNIFICANT CHANGE UP (ref 0–2)
BUN SERPL-MCNC: 11 MG/DL — SIGNIFICANT CHANGE UP (ref 7–18)
CALCIUM SERPL-MCNC: 8.9 MG/DL — SIGNIFICANT CHANGE UP (ref 8.4–10.5)
CHLORIDE SERPL-SCNC: 104 MMOL/L — SIGNIFICANT CHANGE UP (ref 96–108)
CO2 SERPL-SCNC: 27 MMOL/L — SIGNIFICANT CHANGE UP (ref 22–31)
CREAT SERPL-MCNC: 1.17 MG/DL — SIGNIFICANT CHANGE UP (ref 0.5–1.3)
EOSINOPHIL # BLD AUTO: 0.2 K/UL — SIGNIFICANT CHANGE UP (ref 0–0.5)
EOSINOPHIL NFR BLD AUTO: 1.9 % — SIGNIFICANT CHANGE UP (ref 0–6)
GLUCOSE BLDC GLUCOMTR-MCNC: 143 MG/DL — HIGH (ref 70–99)
GLUCOSE BLDC GLUCOMTR-MCNC: 147 MG/DL — HIGH (ref 70–99)
GLUCOSE BLDC GLUCOMTR-MCNC: 173 MG/DL — HIGH (ref 70–99)
GLUCOSE BLDC GLUCOMTR-MCNC: 186 MG/DL — HIGH (ref 70–99)
GLUCOSE SERPL-MCNC: 114 MG/DL — HIGH (ref 70–99)
HCT VFR BLD CALC: 27.2 % — LOW (ref 34.5–45)
HGB BLD-MCNC: 8.5 G/DL — LOW (ref 11.5–15.5)
LYMPHOCYTES # BLD AUTO: 2.4 K/UL — SIGNIFICANT CHANGE UP (ref 1–3.3)
LYMPHOCYTES # BLD AUTO: 25.8 % — SIGNIFICANT CHANGE UP (ref 13–44)
MCHC RBC-ENTMCNC: 27.9 PG — SIGNIFICANT CHANGE UP (ref 27–34)
MCHC RBC-ENTMCNC: 31.4 GM/DL — LOW (ref 32–36)
MCV RBC AUTO: 89 FL — SIGNIFICANT CHANGE UP (ref 80–100)
MONOCYTES # BLD AUTO: 0.9 K/UL — SIGNIFICANT CHANGE UP (ref 0–0.9)
MONOCYTES NFR BLD AUTO: 10.1 % — SIGNIFICANT CHANGE UP (ref 2–14)
NEUTROPHILS # BLD AUTO: 5.8 K/UL — SIGNIFICANT CHANGE UP (ref 1.8–7.4)
NEUTROPHILS NFR BLD AUTO: 61.5 % — SIGNIFICANT CHANGE UP (ref 43–77)
PLATELET # BLD AUTO: 532 K/UL — HIGH (ref 150–400)
POTASSIUM SERPL-MCNC: 4 MMOL/L — SIGNIFICANT CHANGE UP (ref 3.5–5.3)
POTASSIUM SERPL-SCNC: 4 MMOL/L — SIGNIFICANT CHANGE UP (ref 3.5–5.3)
RBC # BLD: 3.06 M/UL — LOW (ref 3.8–5.2)
RBC # FLD: 16 % — HIGH (ref 10.3–14.5)
SODIUM SERPL-SCNC: 138 MMOL/L — SIGNIFICANT CHANGE UP (ref 135–145)
WBC # BLD: 9.4 K/UL — SIGNIFICANT CHANGE UP (ref 3.8–10.5)
WBC # FLD AUTO: 9.4 K/UL — SIGNIFICANT CHANGE UP (ref 3.8–10.5)

## 2018-09-05 RX ADMIN — Medication 650 MILLIGRAM(S): at 06:40

## 2018-09-05 RX ADMIN — PANTOPRAZOLE SODIUM 40 MILLIGRAM(S): 20 TABLET, DELAYED RELEASE ORAL at 06:40

## 2018-09-05 RX ADMIN — Medication 650 MILLIGRAM(S): at 22:35

## 2018-09-05 RX ADMIN — DONEPEZIL HYDROCHLORIDE 10 MILLIGRAM(S): 10 TABLET, FILM COATED ORAL at 21:32

## 2018-09-05 RX ADMIN — Medication 650 MILLIGRAM(S): at 07:02

## 2018-09-05 RX ADMIN — PANTOPRAZOLE SODIUM 40 MILLIGRAM(S): 20 TABLET, DELAYED RELEASE ORAL at 17:43

## 2018-09-05 RX ADMIN — Medication 650 MILLIGRAM(S): at 21:32

## 2018-09-05 NOTE — PROGRESS NOTE ADULT - SUBJECTIVE AND OBJECTIVE BOX
Resident Note discussed with  primary attending    Patient is a 89y old  Female who presents with a chief complaint of anemia (05 Sep 2018 14:20)      INTERVAL HPI/OVERNIGHT EVENTS: no new complaints overnight. Patient's clinical condition unchanged. Family requesting to advance diet to puree and want to check if she spikes any fever off antibiotics as discussed with grand son Mr. To over phone.    MEDICATIONS  (STANDING):  donepezil 10 milliGRAM(s) Oral at bedtime  pantoprazole  Injectable 40 milliGRAM(s) IV Push two times a day    MEDICATIONS  (PRN):  acetaminophen    Suspension. 650 milliGRAM(s) Oral every 6 hours PRN Moderate Pain (4 - 6)  glucagon  Injectable 1 milliGRAM(s) IntraMuscular once PRN Glucose LESS THAN 70 milligrams/deciliter      __________________________________________________  REVIEW OF SYSTEMS:  Limited ROS due to clinical condition      Vital Signs Last 24 Hrs  T(C): 36.6 (05 Sep 2018 14:35), Max: 37 (04 Sep 2018 21:45)  T(F): 97.9 (05 Sep 2018 14:35), Max: 98.6 (04 Sep 2018 21:45)  HR: 81 (05 Sep 2018 14:35) (81 - 101)  BP: 131/60 (05 Sep 2018 14:35) (108/63 - 136/59)  BP(mean): --  RR: 18 (05 Sep 2018 14:35) (17 - 18)  SpO2: 100% (05 Sep 2018 14:35) (98% - 100%)    ________________________________________________  PHYSICAL EXAM:  GENERAL: patient lying comfortably in bed, not in distress  HEENT:Normocephalic;  conjunctivae and sclerae clear; moist mucous membranes;   NECK : supple  CHEST/LUNG: Clear to auscultation bilaterally with good air entry   HEART: S1 S2  regular;   ABDOMEN: Soft, Nontender, Nondistended; Bowel sounds present  EXTREMITIES: no cyanosis; no edema; no calf tenderness  NERVOUS SYSTEM:  no new deficits    _________________________________________________  LABS:                        8.5    9.4   )-----------( 532      ( 05 Sep 2018 06:09 )             27.2     09-05    138  |  104  |  11  ----------------------------<  114<H>  4.0   |  27  |  1.17    Ca    8.9      05 Sep 2018 06:09          CAPILLARY BLOOD GLUCOSE      POCT Blood Glucose.: 186 mg/dL (05 Sep 2018 16:55)  POCT Blood Glucose.: 173 mg/dL (05 Sep 2018 11:47)  POCT Blood Glucose.: 143 mg/dL (05 Sep 2018 07:57)        RADIOLOGY & ADDITIONAL TESTS: NO NEW IMAGING STUDIES PERFORMED TODAY.    Plan of care was discussed with patient and /or primary care giver; all questions and concerns were addressed and care was aligned with patient's wishes.

## 2018-09-05 NOTE — PROGRESS NOTE ADULT - SUBJECTIVE AND OBJECTIVE BOX
HPI:  89yo female, from NH, AAOx1, with PMHx Dementia, HTN, DM, HLD, Iron deficiency anemia, Urinary Incontinence, sent from NH for Hb: 5. Patient is very demented and unable to give further details. Resting comfortably in bed, no abdominal pain, N/V/ hematochezia or hematemesis. Grandson at bedside, refers that patient has been having dark bowel movements for a couple of months back, before being admitted to NH for rehab. Patient was recently admitted on July 30th for syncopal episode, found to be anemic (Hb: 7.1), FOBT (+), underwent EGD which was negative for active bleeding, found to have bazoar in stomach. 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 GOC discussed at length with grandson: Yousuf. Patient remains FULL CODE. He wants everything to be done for the patient, "until she tells me to stop". (23 Aug 2018 17:03)      Patient is a 89y old  Female who presents with a chief complaint of anemia (04 Sep 2018 17:37)      INTERVAL HPI/OVERNIGHT EVENTS:  T(C): 36.6 (09-05-18 @ 05:15), Max: 37 (09-04-18 @ 21:45)  HR: 101 (09-05-18 @ 05:15) (86 - 101)  BP: 108/63 (09-05-18 @ 05:15) (108/63 - 136/59)  RR: 18 (09-05-18 @ 05:15) (17 - 18)  SpO2: 98% (09-05-18 @ 05:15) (98% - 100%)  Wt(kg): --  I&O's Summary      REVIEW OF SYSTEMS: denies fever, chills, SOB, palpitations, chest pain, abdominal pain, nausea, vomitting, diarrhea, constipation, dizziness    MEDICATIONS  (STANDING):  donepezil 10 milliGRAM(s) Oral at bedtime  pantoprazole  Injectable 40 milliGRAM(s) IV Push two times a day    MEDICATIONS  (PRN):  acetaminophen    Suspension. 650 milliGRAM(s) Oral every 6 hours PRN Moderate Pain (4 - 6)  glucagon  Injectable 1 milliGRAM(s) IntraMuscular once PRN Glucose LESS THAN 70 milligrams/deciliter      PHYSICAL EXAM:  GENERAL: NAD, well-groomed, well-developed  HEAD:  Atraumatic, Normocephalic  EYES: EOMI, PERRLA, conjunctiva and sclera clear  ENMT: No tonsillar erythema, exudates, or enlargement; Moist mucous membranes, Good dentition, No lesions  NECK: Supple, No JVD, Normal thyroid  NERVOUS SYSTEM:  Alert & Oriented X3, Good concentration; Motor Strength 5/5 B/L upper and lower extremities; DTRs 2+ intact and symmetric  CHEST/LUNG: Clear to percussion bilaterally; No rales, rhonchi, wheezing, or rubs  HEART: Regular rate and rhythm; No murmurs, rubs, or gallops  ABDOMEN: Soft, Nontender, Nondistended; Bowel sounds present  EXTREMITIES:  2+ Peripheral Pulses, No clubbing, cyanosis, or edema  LYMPH: No lymphadenopathy noted  SKIN: No rashes or lesions  LABS:                        8.5    9.4   )-----------( 532      ( 05 Sep 2018 06:09 )             27.2     09-05    138  |  104  |  11  ----------------------------<  114<H>  4.0   |  27  |  1.17    Ca    8.9      05 Sep 2018 06:09          CAPILLARY BLOOD GLUCOSE      POCT Blood Glucose.: 173 mg/dL (05 Sep 2018 11:47)  POCT Blood Glucose.: 143 mg/dL (05 Sep 2018 07:57)  POCT Blood Glucose.: 144 mg/dL (04 Sep 2018 16:54)

## 2018-09-05 NOTE — PROGRESS NOTE ADULT - PROBLEM SELECTOR PLAN 5
[] Previous VTE                                                3  [] Thrombophilia                                             2  [] Lower limb paralysis                                   2    [] Current Cancer                                             2   [x] Immobilization > 24 hrs                              1  [] ICU/CCU stay > 24 hours                             1  [x] Age > 60                                                         1    IMPROVE VTE Score: 2, DVT ppx with SCD boots due to GI bleed.
[facility] discussed at length with Grandson (proxy): Yousuf, he wants everything to be done, regardless of the consequences of CPR/intubation, "until she tells me to stop". Patient remains FULL CODE.  Will consult palliative care, in setting of advanced dementia, pancreatic mass and GI bleed, grandson with unrealistic expectations.
[] Previous VTE                                                3  [] Thrombophilia                                             2  [] Lower limb paralysis                                   2    [] Current Cancer                                             2   [x] Immobilization > 24 hrs                              1  [] ICU/CCU stay > 24 hours                             1  [x] Age > 60                                                         1    IMPROVE VTE Score: 2, DVT ppx with SCD boots due to GI bleed.

## 2018-09-05 NOTE — PROGRESS NOTE ADULT - PROBLEM SELECTOR PLAN 4
Will hold off from antihypertensives in setting of GI bleed  Monitor vitals closely
Met with patient's HCP, Yousuf, face to face. Discussed status; educated on dementia disease trajectory. Again discussed risks vs benefits of resuscitation, intubation, artificial nutrition. HCP acknowledges patient's advanced age and concern that pt would likely pull out feeding tube if placed - requests to continue comfort foods at this time. Reviewed MOLST and confirmed choices: DNR / DNI / send to hospital / no feeding tube / trial IV fluids / use abx; goal of care is comfort. All questions answered; supportive counseling provided. DNR / MOLST forms in chart.
Will hold off from antihypertensives in setting of GI bleed  Monitor vitals closely

## 2018-09-05 NOTE — CHART NOTE - NSCHARTNOTESELECT_GEN_ALL_CORE
Event Note
Event Note/EGD/Colonoscopy Report
Nutrition Services
Off Service Note

## 2018-09-05 NOTE — PROGRESS NOTE ADULT - PROBLEM SELECTOR PROBLEM 1
GIB (gastrointestinal bleeding)
GIB (gastrointestinal bleeding)
Late onset Alzheimer's disease without behavioral disturbance
GIB (gastrointestinal bleeding)

## 2018-09-05 NOTE — PROGRESS NOTE ADULT - ASSESSMENT
89yo female, from NH, AAOx1, with PMHx Dementia, HTN, DM, HLD, Iron deficiency anemia, Urinary Incontinence, sent from NH for Hb: 5. Patient is very demented and unable to give further details. Resting comfortably in bed, no abdominal pain, N/V/ hematochezia or hematemesis. Pt  recent Hb on 8/26 is 9.5.
89yo female, from NH, AAOx1, with PMHx Dementia, HTN, DM, HLD, Iron deficiency anemia, Urinary Incontinence, sent from NH for Hb: 5. Patient is very demented and unable to give further details. Resting comfortably in bed, no abdominal pain, N/V/ hematochezia or hematemesis. Pt  recent Hb on 8/26 is 9.5.
89yo female, from NH, [facility], with PMHx Dementia, HTN, DM, HLD, Iron deficiency anemia, Urinary Incontinence, sent from NH for Hb: 5. admitted for evaluation of anemia, found to have duodenal and cecal ulcer.
Plan:  - Monitor CBC  - Transfuse for Hb<7-8  - Protonix  - Advance diet as tolerated  - Plan for EGD and Colonoscopy on Monday 08/27  - Place an NG tube and prepare with 4L [place] on Sunday 08/26  - Clearliquid diet on Sunday 08/26  - NPO after midnight on 08/26
Severe PUD causing partial GOO. Unclear etiology PUD. Pancreatic mass on US.   No further bleeding seen   Family does not seem to understand pateint condition suggest a palliative care consult   PPI daily   Full liquid diet for now (partial GOO)
Severe PUD causing partial GOO. Unclear etiology PUD. Pancreatic mass on US.   No further bleeding seen   Full liquid diet   PPI BID   Family discussion for GOC.
pt had change in MS  less talking was not opening  i was called advised  ct head  ua  urine cxs  pt is lethargic but not in any distress  arosable  can raise arms   perrla eomi  lungs clear  heart s1s2 nml, abd non tender no suprapubic tenderness.  lt wrist mild tenderness  wbc 13  ct head nml, lt wrist no fx  cxr no pneumonia,   ua pending  HGB stable  a/p altered MS   neuro to f/u  pt is 89 pancreatic mass  will watch  already on antibxs
seen and examined  89 yr old female at rehab center , mainly oon bed / WC  dementia, htn, dm, hld , was recently d/c from hosp to [facility] ( had anemia, sono and ct showed  pancreatic mass/ gastric outlet obstruction)  in nh repeat hgb came little over 5 , pt sent to er  and her guiac was pos in er.  pt was started  on prbc.  GI called   pt is 89 yr old not in good physical condition, but per family is full code.   palleative care was also requested  pt is arousable  declines any abd pain no nausea or vomiting .  awake but confused ,  abd soft bs nml  minimal rt lower q tenderness.  anemia  s/p bt  initially hgb went over 7 now 6.5  need more BT,  omeperazole.  GI  colonoscopy , egd on monday  palleative care  i d/w   poor prognosis
seen and examined  comfortable no furhur bleed  hasd BT yesterday  no abd pain   anemia/gi bleed  s/p egd/colon  gastric/duodenal and cecal ulcer    GI f/u  todas hgb up 8.8  watch hgb  89 yr dementia, bed bound  GI tract ulcer  poor prognosis
seen and examined  mental status is better  afebrile physical unchanged  holdin hands  wbc 12.2    no furthur bleed upper and lower GI ulcer  had banding  hgb stable
seen and examined  vs stable afebrile  physical unchanged   on bed more awake  abd soft bs nt, nd  wbc 15  hgb 8.9  anemia sec to gi bleed watch hgb colono and egd on monday  wbc high no cough pt came with wbc of 15 still 15 will watch
seen and examined  vs stable afebrile p/e done  pt is awake confused not in any distress on bed  denies any pain/abd pain    abd and p/e benign.  hgb over 8  stable  no futhur bleed  UGI amd LHI ulvers  apetite is not great but family has refudsed for any PEG  advanced dementia  poor prognosis  D/C tp [facility]
seen and examined  vs stable afebrile physical unchanged except lt wrist pain a t IV site    s/p egd/colonoscoy  pyloric,duodenal / cecal ulcerpartial gastric outlet obstruction  will watch hgb   GI to f/u   sup phlebitis  warm compresses  tylenol  anemia  watch hgb  overall poor prognosis
seen and examined vs stable  afebrile physical unchnged   no abd pain  no heart burns , no nausea  hgb over 9    gi bleed   gi to f/u  tmrw colon and egd
seen and examined vs stable afebrile p/e unchanged  more awake not in any distress   apetite is still 50 percent   no furthur gi bleed  hgb stable   pt can be d/cd to rehab  watch apetite   frequent change in position
89yo female, from NH, AAOx1, with PMHx Dementia, HTN, DM, HLD, Iron deficiency anemia, Urinary Incontinence, sent from NH for Hb: 5. Patient is very demented and unable to give further details. Resting comfortably in bed, no abdominal pain, N/V/ hematochezia or hematemesis. Pt  recent Hb on 8/26 is 9.5.
89yo female, from NH, AAOx1, with PMHx Dementia, HTN, DM, HLD, Iron deficiency anemia, Urinary Incontinence, sent from NH for Hb: 5. Patient is very demented and unable to give further details. Resting comfortably in bed, no abdominal pain, N/V/ hematochezia or hematemesis. Pt has been transfused 4 PRBC. Recent Hb is 8.1.

## 2018-09-05 NOTE — PROGRESS NOTE ADULT - PROBLEM SELECTOR PROBLEM 3
Debility
Diabetes mellitus

## 2018-09-05 NOTE — PROGRESS NOTE ADULT - PROBLEM SELECTOR PROBLEM 5
Goals of care, counseling/discussion
Need for prophylactic measure

## 2018-09-05 NOTE — PROGRESS NOTE ADULT - PROBLEM SELECTOR PROBLEM 4
Hypertension, unspecified type
Hypertension, unspecified type
Palliative care encounter
Hypertension, unspecified type

## 2018-09-05 NOTE — PROGRESS NOTE ADULT - PROBLEM SELECTOR PLAN 3
2/2 advancing dementia. Pt A&O x 2, nonambulatory, 2 person assist with ADLs, incontinent, variable PO intake per NH report. Pt requires 24 hr care. PT recommending STEPAN.
Patient on Metformin 500 mg QD and HiSS at NH  HbA1C: 5.7%  c/w HISS  FS q6hrs as NPO

## 2018-09-05 NOTE — PROGRESS NOTE ADULT - PROVIDER SPECIALTY LIST ADULT
Gastroenterology
Internal Medicine
Palliative Care
Internal Medicine

## 2018-09-05 NOTE — PROGRESS NOTE ADULT - PROBLEM SELECTOR PROBLEM 2
Dementia
Dementia
Severe protein-calorie malnutrition
Dementia

## 2018-09-06 VITALS
TEMPERATURE: 98 F | OXYGEN SATURATION: 98 % | SYSTOLIC BLOOD PRESSURE: 122 MMHG | RESPIRATION RATE: 16 BRPM | HEART RATE: 92 BPM | DIASTOLIC BLOOD PRESSURE: 58 MMHG

## 2018-09-06 LAB
ANION GAP SERPL CALC-SCNC: 9 MMOL/L — SIGNIFICANT CHANGE UP (ref 5–17)
BASOPHILS # BLD AUTO: 0.1 K/UL — SIGNIFICANT CHANGE UP (ref 0–0.2)
BASOPHILS NFR BLD AUTO: 1.5 % — SIGNIFICANT CHANGE UP (ref 0–2)
BUN SERPL-MCNC: 15 MG/DL — SIGNIFICANT CHANGE UP (ref 7–18)
CALCIUM SERPL-MCNC: 8.9 MG/DL — SIGNIFICANT CHANGE UP (ref 8.4–10.5)
CHLORIDE SERPL-SCNC: 106 MMOL/L — SIGNIFICANT CHANGE UP (ref 96–108)
CO2 SERPL-SCNC: 23 MMOL/L — SIGNIFICANT CHANGE UP (ref 22–31)
CREAT SERPL-MCNC: 1.23 MG/DL — SIGNIFICANT CHANGE UP (ref 0.5–1.3)
EOSINOPHIL # BLD AUTO: 0.1 K/UL — SIGNIFICANT CHANGE UP (ref 0–0.5)
EOSINOPHIL NFR BLD AUTO: 1.6 % — SIGNIFICANT CHANGE UP (ref 0–6)
GLUCOSE BLDC GLUCOMTR-MCNC: 118 MG/DL — HIGH (ref 70–99)
GLUCOSE BLDC GLUCOMTR-MCNC: 125 MG/DL — HIGH (ref 70–99)
GLUCOSE SERPL-MCNC: 108 MG/DL — HIGH (ref 70–99)
HCT VFR BLD CALC: 27.6 % — LOW (ref 34.5–45)
HGB BLD-MCNC: 8.7 G/DL — LOW (ref 11.5–15.5)
LYMPHOCYTES # BLD AUTO: 2 K/UL — SIGNIFICANT CHANGE UP (ref 1–3.3)
LYMPHOCYTES # BLD AUTO: 24.1 % — SIGNIFICANT CHANGE UP (ref 13–44)
MCHC RBC-ENTMCNC: 28 PG — SIGNIFICANT CHANGE UP (ref 27–34)
MCHC RBC-ENTMCNC: 31.4 GM/DL — LOW (ref 32–36)
MCV RBC AUTO: 89.2 FL — SIGNIFICANT CHANGE UP (ref 80–100)
MONOCYTES # BLD AUTO: 0.9 K/UL — SIGNIFICANT CHANGE UP (ref 0–0.9)
MONOCYTES NFR BLD AUTO: 10.3 % — SIGNIFICANT CHANGE UP (ref 2–14)
NEUTROPHILS # BLD AUTO: 5.2 K/UL — SIGNIFICANT CHANGE UP (ref 1.8–7.4)
NEUTROPHILS NFR BLD AUTO: 62.5 % — SIGNIFICANT CHANGE UP (ref 43–77)
PLATELET # BLD AUTO: 512 K/UL — HIGH (ref 150–400)
POTASSIUM SERPL-MCNC: 4.7 MMOL/L — SIGNIFICANT CHANGE UP (ref 3.5–5.3)
POTASSIUM SERPL-SCNC: 4.7 MMOL/L — SIGNIFICANT CHANGE UP (ref 3.5–5.3)
RBC # BLD: 3.1 M/UL — LOW (ref 3.8–5.2)
RBC # FLD: 16.3 % — HIGH (ref 10.3–14.5)
SODIUM SERPL-SCNC: 138 MMOL/L — SIGNIFICANT CHANGE UP (ref 135–145)
WBC # BLD: 8.3 K/UL — SIGNIFICANT CHANGE UP (ref 3.8–10.5)
WBC # FLD AUTO: 8.3 K/UL — SIGNIFICANT CHANGE UP (ref 3.8–10.5)

## 2018-09-06 RX ADMIN — PANTOPRAZOLE SODIUM 40 MILLIGRAM(S): 20 TABLET, DELAYED RELEASE ORAL at 05:14

## 2018-09-17 ENCOUNTER — INPATIENT (INPATIENT)
Facility: HOSPITAL | Age: 83
LOS: 9 days | Discharge: HOSPICE | DRG: 377 | End: 2018-09-27
Attending: INTERNAL MEDICINE | Admitting: INTERNAL MEDICINE
Payer: COMMERCIAL

## 2018-09-17 VITALS
WEIGHT: 151.9 LBS | DIASTOLIC BLOOD PRESSURE: 83 MMHG | SYSTOLIC BLOOD PRESSURE: 127 MMHG | RESPIRATION RATE: 20 BRPM | TEMPERATURE: 98 F | OXYGEN SATURATION: 100 % | HEIGHT: 63 IN | HEART RATE: 108 BPM

## 2018-09-17 DIAGNOSIS — E11.9 TYPE 2 DIABETES MELLITUS WITHOUT COMPLICATIONS: ICD-10-CM

## 2018-09-17 DIAGNOSIS — K92.2 GASTROINTESTINAL HEMORRHAGE, UNSPECIFIED: ICD-10-CM

## 2018-09-17 DIAGNOSIS — F03.90 UNSPECIFIED DEMENTIA WITHOUT BEHAVIORAL DISTURBANCE: ICD-10-CM

## 2018-09-17 DIAGNOSIS — Z29.9 ENCOUNTER FOR PROPHYLACTIC MEASURES, UNSPECIFIED: ICD-10-CM

## 2018-09-17 DIAGNOSIS — E78.3 HYPERCHYLOMICRONEMIA: ICD-10-CM

## 2018-09-17 DIAGNOSIS — Z51.5 ENCOUNTER FOR PALLIATIVE CARE: ICD-10-CM

## 2018-09-17 LAB
ALBUMIN SERPL ELPH-MCNC: 2.5 G/DL — LOW (ref 3.5–5)
ALP SERPL-CCNC: 82 U/L — SIGNIFICANT CHANGE UP (ref 40–120)
ALT FLD-CCNC: 17 U/L DA — SIGNIFICANT CHANGE UP (ref 10–60)
ANION GAP SERPL CALC-SCNC: 7 MMOL/L — SIGNIFICANT CHANGE UP (ref 5–17)
AST SERPL-CCNC: 17 U/L — SIGNIFICANT CHANGE UP (ref 10–40)
BASOPHILS # BLD AUTO: 0.1 K/UL — SIGNIFICANT CHANGE UP (ref 0–0.2)
BASOPHILS NFR BLD AUTO: 0.9 % — SIGNIFICANT CHANGE UP (ref 0–2)
BILIRUB SERPL-MCNC: 0.2 MG/DL — SIGNIFICANT CHANGE UP (ref 0.2–1.2)
BUN SERPL-MCNC: 41 MG/DL — HIGH (ref 7–18)
CALCIUM SERPL-MCNC: 9.5 MG/DL — SIGNIFICANT CHANGE UP (ref 8.4–10.5)
CHLORIDE SERPL-SCNC: 114 MMOL/L — HIGH (ref 96–108)
CO2 SERPL-SCNC: 23 MMOL/L — SIGNIFICANT CHANGE UP (ref 22–31)
CREAT SERPL-MCNC: 1.05 MG/DL — SIGNIFICANT CHANGE UP (ref 0.5–1.3)
EOSINOPHIL # BLD AUTO: 0 K/UL — SIGNIFICANT CHANGE UP (ref 0–0.5)
EOSINOPHIL NFR BLD AUTO: 0.2 % — SIGNIFICANT CHANGE UP (ref 0–6)
GLUCOSE BLDC GLUCOMTR-MCNC: 127 MG/DL — HIGH (ref 70–99)
GLUCOSE SERPL-MCNC: 131 MG/DL — HIGH (ref 70–99)
HCT VFR BLD CALC: 23.3 % — LOW (ref 34.5–45)
HCT VFR BLD CALC: 25.3 % — LOW (ref 34.5–45)
HGB BLD-MCNC: 7.3 G/DL — LOW (ref 11.5–15.5)
HGB BLD-MCNC: 8.1 G/DL — LOW (ref 11.5–15.5)
LYMPHOCYTES # BLD AUTO: 15.1 % — SIGNIFICANT CHANGE UP (ref 13–44)
LYMPHOCYTES # BLD AUTO: 2 K/UL — SIGNIFICANT CHANGE UP (ref 1–3.3)
MCHC RBC-ENTMCNC: 28.6 PG — SIGNIFICANT CHANGE UP (ref 27–34)
MCHC RBC-ENTMCNC: 28.6 PG — SIGNIFICANT CHANGE UP (ref 27–34)
MCHC RBC-ENTMCNC: 31.5 GM/DL — LOW (ref 32–36)
MCHC RBC-ENTMCNC: 32 GM/DL — SIGNIFICANT CHANGE UP (ref 32–36)
MCV RBC AUTO: 89.3 FL — SIGNIFICANT CHANGE UP (ref 80–100)
MCV RBC AUTO: 90.8 FL — SIGNIFICANT CHANGE UP (ref 80–100)
MONOCYTES # BLD AUTO: 1.1 K/UL — HIGH (ref 0–0.9)
MONOCYTES NFR BLD AUTO: 8.6 % — SIGNIFICANT CHANGE UP (ref 2–14)
NEUTROPHILS # BLD AUTO: 9.9 K/UL — HIGH (ref 1.8–7.4)
NEUTROPHILS NFR BLD AUTO: 75.2 % — SIGNIFICANT CHANGE UP (ref 43–77)
OB PNL STL: POSITIVE
PLATELET # BLD AUTO: 282 K/UL — SIGNIFICANT CHANGE UP (ref 150–400)
PLATELET # BLD AUTO: 288 K/UL — SIGNIFICANT CHANGE UP (ref 150–400)
POTASSIUM SERPL-MCNC: 4.6 MMOL/L — SIGNIFICANT CHANGE UP (ref 3.5–5.3)
POTASSIUM SERPL-SCNC: 4.6 MMOL/L — SIGNIFICANT CHANGE UP (ref 3.5–5.3)
PROT SERPL-MCNC: 7.3 G/DL — SIGNIFICANT CHANGE UP (ref 6–8.3)
RBC # BLD: 2.57 M/UL — LOW (ref 3.8–5.2)
RBC # BLD: 2.83 M/UL — LOW (ref 3.8–5.2)
RBC # FLD: 15.7 % — HIGH (ref 10.3–14.5)
RBC # FLD: 17.9 % — HIGH (ref 10.3–14.5)
SODIUM SERPL-SCNC: 144 MMOL/L — SIGNIFICANT CHANGE UP (ref 135–145)
WBC # BLD: 13.1 K/UL — HIGH (ref 3.8–10.5)
WBC # BLD: 16.6 K/UL — HIGH (ref 3.8–10.5)
WBC # FLD AUTO: 13.1 K/UL — HIGH (ref 3.8–10.5)
WBC # FLD AUTO: 16.6 K/UL — HIGH (ref 3.8–10.5)

## 2018-09-17 PROCEDURE — 99285 EMERGENCY DEPT VISIT HI MDM: CPT

## 2018-09-17 PROCEDURE — 71045 X-RAY EXAM CHEST 1 VIEW: CPT | Mod: 26

## 2018-09-17 RX ORDER — FERROUS SULFATE 325(65) MG
1 TABLET ORAL
Qty: 0 | Refills: 0 | COMMUNITY

## 2018-09-17 RX ORDER — PREGABALIN 225 MG/1
1000 CAPSULE ORAL DAILY
Qty: 0 | Refills: 0 | Status: DISCONTINUED | OUTPATIENT
Start: 2018-09-17 | End: 2018-09-27

## 2018-09-17 RX ORDER — FOLIC ACID 0.8 MG
1 TABLET ORAL DAILY
Qty: 0 | Refills: 0 | Status: DISCONTINUED | OUTPATIENT
Start: 2018-09-17 | End: 2018-09-27

## 2018-09-17 RX ORDER — ALPRAZOLAM 0.25 MG
0.25 TABLET ORAL AT BEDTIME
Qty: 0 | Refills: 0 | Status: DISCONTINUED | OUTPATIENT
Start: 2018-09-17 | End: 2018-09-24

## 2018-09-17 RX ORDER — DULOXETINE HYDROCHLORIDE 30 MG/1
30 CAPSULE, DELAYED RELEASE ORAL DAILY
Qty: 0 | Refills: 0 | Status: DISCONTINUED | OUTPATIENT
Start: 2018-09-17 | End: 2018-09-19

## 2018-09-17 RX ORDER — PANTOPRAZOLE SODIUM 20 MG/1
40 TABLET, DELAYED RELEASE ORAL
Qty: 0 | Refills: 0 | Status: DISCONTINUED | OUTPATIENT
Start: 2018-09-17 | End: 2018-09-24

## 2018-09-17 RX ORDER — SODIUM CHLORIDE 9 MG/ML
1000 INJECTION, SOLUTION INTRAVENOUS
Qty: 0 | Refills: 0 | Status: DISCONTINUED | OUTPATIENT
Start: 2018-09-17 | End: 2018-09-27

## 2018-09-17 RX ORDER — SOD,AMMONIUM,POTASSIUM LACTATE
1 CREAM (GRAM) TOPICAL
Qty: 0 | Refills: 0 | COMMUNITY

## 2018-09-17 RX ORDER — GABAPENTIN 400 MG/1
1 CAPSULE ORAL
Qty: 0 | Refills: 0 | COMMUNITY

## 2018-09-17 RX ORDER — METFORMIN HYDROCHLORIDE 850 MG/1
1 TABLET ORAL
Qty: 0 | Refills: 0 | COMMUNITY

## 2018-09-17 RX ORDER — DONEPEZIL HYDROCHLORIDE 10 MG/1
10 TABLET, FILM COATED ORAL AT BEDTIME
Qty: 0 | Refills: 0 | Status: DISCONTINUED | OUTPATIENT
Start: 2018-09-17 | End: 2018-09-27

## 2018-09-17 RX ORDER — DONEPEZIL HYDROCHLORIDE 10 MG/1
1 TABLET, FILM COATED ORAL
Qty: 0 | Refills: 0 | COMMUNITY

## 2018-09-17 RX ORDER — SIMVASTATIN 20 MG/1
1 TABLET, FILM COATED ORAL
Qty: 0 | Refills: 0 | COMMUNITY

## 2018-09-17 RX ORDER — INSULIN LISPRO 100/ML
VIAL (ML) SUBCUTANEOUS
Qty: 0 | Refills: 0 | Status: DISCONTINUED | OUTPATIENT
Start: 2018-09-17 | End: 2018-09-27

## 2018-09-17 RX ADMIN — SODIUM CHLORIDE 75 MILLILITER(S): 9 INJECTION, SOLUTION INTRAVENOUS at 23:53

## 2018-09-17 RX ADMIN — PANTOPRAZOLE SODIUM 40 MILLIGRAM(S): 20 TABLET, DELAYED RELEASE ORAL at 18:37

## 2018-09-17 RX ADMIN — DONEPEZIL HYDROCHLORIDE 10 MILLIGRAM(S): 10 TABLET, FILM COATED ORAL at 23:50

## 2018-09-17 RX ADMIN — Medication 0.25 MILLIGRAM(S): at 21:18

## 2018-09-17 NOTE — H&P ADULT - HISTORY OF PRESENT ILLNESS
89 year old female, from [facility], AAOx1, with PMHx Dementia, HTN, DM, HLD, Iron deficiency anemia, and Urinary Incontinence sent from NH for reported Hb 6.5 - Patient is very demented and unable to give further details. Grandson at bedside assists w/ HPI - patient reportedly had Hg 9 the week prior - Grandson endorses poor PO intake, otherwise denies abdominal pain, NVDC, change in habits, anticoagulants, urinary symptoms, or any other complaints. In the ED patient seen resting and occasionally screaming - Patient was recently admitted syncopal episode and found to be anemic (Hb: 7.1), FOBT (+), underwent EGD which was negative for active bleeding, found to have bezoar in stomach. 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 GOC discussed at length with steven To - confirmed DNR/DNI.

## 2018-09-17 NOTE — H&P ADULT - NSHPLABSRESULTS_GEN_ALL_CORE
CBC Full  -  ( 17 Sep 2018 15:18 )  WBC Count : 13.1 K/uL  Hemoglobin : 7.3 g/dL  Hematocrit : 23.3 %  Platelet Count - Automated : 288 K/uL  Mean Cell Volume : 90.8 fl  Mean Cell Hemoglobin : 28.6 pg  Mean Cell Hemoglobin Concentration : 31.5 gm/dL  Auto Neutrophil # : 9.9 K/uL  Auto Lymphocyte # : 2.0 K/uL  Auto Monocyte # : 1.1 K/uL  Auto Eosinophil # : 0.0 K/uL  Auto Basophil # : 0.1 K/uL  Auto Neutrophil % : 75.2 %  Auto Lymphocyte % : 15.1 %  Auto Monocyte % : 8.6 %  Auto Eosinophil % : 0.2 %  Auto Basophil % : 0.9 %    09-17    144  |  114<H>  |  41<H>  ----------------------------<  131<H>  4.6   |  23  |  1.05    Ca    9.5      17 Sep 2018 15:18    TPro  7.3  /  Alb  2.5<L>  /  TBili  0.2  /  DBili  x   /  AST  17  /  ALT  17  /  AlkPhos  82  09-17    RADIOLOGY & ADDITIONAL STUDIES (The following images were personally reviewed):    EKG sinus tachycardia 119 BPM

## 2018-09-17 NOTE — ED ADULT NURSE NOTE - NSIMPLEMENTINTERV_GEN_ALL_ED
Implemented All Fall with Harm Risk Interventions:  [place] to call system. Call bell, personal items and telephone within reach. Instruct patient to call for assistance. Room bathroom lighting operational. Non-slip footwear when patient is off stretcher. Physically safe environment: no spills, clutter or unnecessary equipment. Stretcher in lowest position, wheels locked, appropriate side rails in place. Provide visual cue, wrist band, yellow gown, etc. Monitor gait and stability. Monitor for mental status changes and reorient to person, place, and time. Review medications for side effects contributing to fall risk. Reinforce activity limits and safety measures with patient and family. Provide visual clues: red socks.

## 2018-09-17 NOTE — H&P ADULT - PROBLEM SELECTOR PLAN 6
IMPROVE VTE Individual Risk Assessment    RISK                                                          Points  [] Previous VTE                                           3  [] Thrombophilia                                        2  [] Lower limb paralysis                              2   [] Current Cancer                                       2   [x] Immobilization > 24 hrs                        1  [] ICU/CCU stay > 24 hours                       1  [x] Age > 60                                                   1    IMPROVE VTE Score: 2, DVT PPx w/ SCD 2/2 anemia

## 2018-09-17 NOTE — ED PROVIDER NOTE - CRITICAL CARE PROVIDED
consult w/ pt's family directly relating to pts condition/interpretation of diagnostic studies/direct patient care (not related to procedure)/additional history taking/consultation with other physicians/documentation

## 2018-09-17 NOTE — H&P ADULT - NSHPPHYSICALEXAM_GEN_ALL_CORE
T(C): 37.1 (17 Sep 2018 17:59), Max: 37.1 (17 Sep 2018 17:59)  T(F): 98.7 (17 Sep 2018 17:59), Max: 98.7 (17 Sep 2018 17:59)  HR: 104 (17 Sep 2018 18:21) (98 - 117)  BP: 135/70 (17 Sep 2018 17:59) (105/65 - 135/70)  RR: 18 (17 Sep 2018 17:59) (18 - 20)  SpO2: 100% (17 Sep 2018 17:59) (100% - 100%)

## 2018-09-17 NOTE — H&P ADULT - PROBLEM SELECTOR PLAN 1
P/w anemia w/ differentials including iron deficient anemia as per prior anemia panel but also include GI ulcers  - FOBT +ve but rectal exam showed hard brown stool  - Prior EGD clean based ulcer; unlikely source of bleeding  - c/w full liquid diet and IV PPI 40 mg BID  - Transfusion 1 unit of pRBC  GI Dr Garzon  ***F/u CBC s/p transfusion

## 2018-09-17 NOTE — H&P ADULT - FAMILY HISTORY
Family history of hyperlipidemia     Sibling  Still living? Unknown  Family history of diabetes mellitus, Age at diagnosis: Age Unknown

## 2018-09-17 NOTE — ED ADULT NURSE NOTE - ED STAT RN HANDOFF DETAILS
Pt endorsed to Andrea Hodges RN in no distress. Blood infusing, no transfusion reaction noted. Family at bedside,

## 2018-09-17 NOTE — H&P ADULT - ASSESSMENT
89 year old female, from [facility], AAOx1, with PMHx Dementia, HTN, DM, HLD, Iron deficiency anemia, and Urinary Incontinence sent from NH for reported Hb 6.5 - admitting for furher evaluation

## 2018-09-17 NOTE — ED PROVIDER NOTE - OBJECTIVE STATEMENT
89 y/o female with PMHx of DM, HTN, HLD, dementia presents to the ED c/o abnormal labs x today. Pt noted to have a hemoglobin level of 6.5. Pt has a Hx of multiple GI bleeds in the past, grandson states pt also has bleeding ulcers in her stomach and colon. Pt denies fever, melena, bloody stool, or any other complaints. NKDA. Pt is DNR DNI.

## 2018-09-18 LAB
ANION GAP SERPL CALC-SCNC: 8 MMOL/L — SIGNIFICANT CHANGE UP (ref 5–17)
BASOPHILS # BLD AUTO: 0.1 K/UL — SIGNIFICANT CHANGE UP (ref 0–0.2)
BASOPHILS NFR BLD AUTO: 0.6 % — SIGNIFICANT CHANGE UP (ref 0–2)
BUN SERPL-MCNC: 26 MG/DL — HIGH (ref 7–18)
CALCIUM SERPL-MCNC: 8.4 MG/DL — SIGNIFICANT CHANGE UP (ref 8.4–10.5)
CHLORIDE SERPL-SCNC: 114 MMOL/L — HIGH (ref 96–108)
CO2 SERPL-SCNC: 21 MMOL/L — LOW (ref 22–31)
CREAT SERPL-MCNC: 0.81 MG/DL — SIGNIFICANT CHANGE UP (ref 0.5–1.3)
EOSINOPHIL # BLD AUTO: 0.1 K/UL — SIGNIFICANT CHANGE UP (ref 0–0.5)
EOSINOPHIL NFR BLD AUTO: 0.7 % — SIGNIFICANT CHANGE UP (ref 0–6)
GLUCOSE BLDC GLUCOMTR-MCNC: 140 MG/DL — HIGH (ref 70–99)
GLUCOSE BLDC GLUCOMTR-MCNC: 169 MG/DL — HIGH (ref 70–99)
GLUCOSE BLDC GLUCOMTR-MCNC: 174 MG/DL — HIGH (ref 70–99)
GLUCOSE BLDC GLUCOMTR-MCNC: 225 MG/DL — HIGH (ref 70–99)
GLUCOSE SERPL-MCNC: 170 MG/DL — HIGH (ref 70–99)
HAPTOGLOB SERPL-MCNC: 298 MG/DL — HIGH (ref 34–200)
HCT VFR BLD CALC: 24.7 % — LOW (ref 34.5–45)
HGB BLD-MCNC: 8.1 G/DL — LOW (ref 11.5–15.5)
LYMPHOCYTES # BLD AUTO: 16.2 % — SIGNIFICANT CHANGE UP (ref 13–44)
LYMPHOCYTES # BLD AUTO: 2.3 K/UL — SIGNIFICANT CHANGE UP (ref 1–3.3)
MAGNESIUM SERPL-MCNC: 1.6 MG/DL — SIGNIFICANT CHANGE UP (ref 1.6–2.6)
MCHC RBC-ENTMCNC: 28.9 PG — SIGNIFICANT CHANGE UP (ref 27–34)
MCHC RBC-ENTMCNC: 32.6 GM/DL — SIGNIFICANT CHANGE UP (ref 32–36)
MCV RBC AUTO: 88.7 FL — SIGNIFICANT CHANGE UP (ref 80–100)
MONOCYTES # BLD AUTO: 1.5 K/UL — HIGH (ref 0–0.9)
MONOCYTES NFR BLD AUTO: 10.4 % — SIGNIFICANT CHANGE UP (ref 2–14)
NEUTROPHILS # BLD AUTO: 10.4 K/UL — HIGH (ref 1.8–7.4)
NEUTROPHILS NFR BLD AUTO: 72.1 % — SIGNIFICANT CHANGE UP (ref 43–77)
PLATELET # BLD AUTO: 306 K/UL — SIGNIFICANT CHANGE UP (ref 150–400)
POTASSIUM SERPL-MCNC: 3.6 MMOL/L — SIGNIFICANT CHANGE UP (ref 3.5–5.3)
POTASSIUM SERPL-SCNC: 3.6 MMOL/L — SIGNIFICANT CHANGE UP (ref 3.5–5.3)
RBC # BLD: 2.79 M/UL — LOW (ref 3.8–5.2)
RBC # BLD: 2.83 M/UL — LOW (ref 3.8–5.2)
RBC # FLD: 16.6 % — HIGH (ref 10.3–14.5)
RETICS #: 124.8 K/UL — SIGNIFICANT CHANGE UP (ref 25–125)
RETICS/RBC NFR: 4.4 % — HIGH (ref 0.5–2.5)
SODIUM SERPL-SCNC: 143 MMOL/L — SIGNIFICANT CHANGE UP (ref 135–145)
WBC # BLD: 14.4 K/UL — HIGH (ref 3.8–10.5)
WBC # FLD AUTO: 14.4 K/UL — HIGH (ref 3.8–10.5)

## 2018-09-18 PROCEDURE — 93971 EXTREMITY STUDY: CPT | Mod: 26,RT

## 2018-09-18 RX ORDER — ACETAMINOPHEN 500 MG
650 TABLET ORAL ONCE
Qty: 0 | Refills: 0 | Status: COMPLETED | OUTPATIENT
Start: 2018-09-18 | End: 2018-09-18

## 2018-09-18 RX ORDER — DULOXETINE HYDROCHLORIDE 30 MG/1
30 CAPSULE, DELAYED RELEASE ORAL ONCE
Qty: 0 | Refills: 0 | Status: COMPLETED | OUTPATIENT
Start: 2018-09-18 | End: 2018-09-18

## 2018-09-18 RX ADMIN — Medication 1: at 07:57

## 2018-09-18 RX ADMIN — PREGABALIN 1000 MICROGRAM(S): 225 CAPSULE ORAL at 11:44

## 2018-09-18 RX ADMIN — Medication 2: at 17:25

## 2018-09-18 RX ADMIN — DONEPEZIL HYDROCHLORIDE 10 MILLIGRAM(S): 10 TABLET, FILM COATED ORAL at 22:27

## 2018-09-18 RX ADMIN — Medication 650 MILLIGRAM(S): at 22:25

## 2018-09-18 RX ADMIN — Medication 1 MILLIGRAM(S): at 11:44

## 2018-09-18 RX ADMIN — Medication 1 MILLIGRAM(S): at 01:30

## 2018-09-18 RX ADMIN — Medication 650 MILLIGRAM(S): at 22:55

## 2018-09-18 RX ADMIN — PANTOPRAZOLE SODIUM 40 MILLIGRAM(S): 20 TABLET, DELAYED RELEASE ORAL at 17:25

## 2018-09-18 RX ADMIN — DULOXETINE HYDROCHLORIDE 30 MILLIGRAM(S): 30 CAPSULE, DELAYED RELEASE ORAL at 11:44

## 2018-09-18 RX ADMIN — DULOXETINE HYDROCHLORIDE 30 MILLIGRAM(S): 30 CAPSULE, DELAYED RELEASE ORAL at 22:24

## 2018-09-18 RX ADMIN — PANTOPRAZOLE SODIUM 40 MILLIGRAM(S): 20 TABLET, DELAYED RELEASE ORAL at 06:10

## 2018-09-18 RX ADMIN — Medication 0.25 MILLIGRAM(S): at 22:24

## 2018-09-18 NOTE — PROGRESS NOTE ADULT - PROBLEM SELECTOR PLAN 1
P/w anemia w/ differentials including iron deficient anemia as per prior anemia panel but also include GI ulcers  - FOBT +ve but rectal exam showed hard brown stool  - Prior EGD clean based ulcer; unlikely source of bleeding  - c/w full liquid diet and IV PPI 40 mg BID  - Transfusion 1 unit of pRBC  GI Dr Garzon  -h/h stable post transfusion  -will check q12  -seems to be possibly transient bleeding? diverticular bleed?

## 2018-09-18 NOTE — CONSULT NOTE ADULT - ASSESSMENT
89 year old female with worsening cognitive decline presents with recurrent anemia.     Patient well known to me form previous admissions. She presents with anemia (likely multifactorial). No evidence of active bleeding. No GI intervention at this time. Palliative consult. Transfuse to keep hemoglobin > 7   Advance diet  as tolerated

## 2018-09-18 NOTE — PROGRESS NOTE ADULT - SUBJECTIVE AND OBJECTIVE BOX
Patient is a 89y old  Female who presents with a chief complaint of anemia (17 Sep 2018 18:36)      Overnight Events: no bloody BM    REVIEW OF SYSTEMS:  Cannot obtain due to dementia    MEDICATIONS  (STANDING):  ALPRAZolam 0.25 milliGRAM(s) Oral at bedtime  cyanocobalamin 1000 MICROGram(s) Oral daily  dextrose 5% + sodium chloride 0.9%. 1000 milliLiter(s) (75 mL/Hr) IV Continuous <Continuous>  donepezil 10 milliGRAM(s) Oral at bedtime  DULoxetine 30 milliGRAM(s) Oral daily  folic acid 1 milliGRAM(s) Oral daily  insulin lispro (HumaLOG) corrective regimen sliding scale   SubCutaneous Before meals and at bedtime  pantoprazole  Injectable 40 milliGRAM(s) IV Push two times a day    MEDICATIONS  (PRN):    Vital Signs Last 24 Hrs  T(C): 36.7 (18 Sep 2018 05:07), Max: 37.1 (17 Sep 2018 17:59)  T(F): 98 (18 Sep 2018 05:07), Max: 98.7 (17 Sep 2018 17:59)  HR: 109 (18 Sep 2018 05:07) (98 - 120)  BP: 132/79 (18 Sep 2018 05:07) (105/65 - 147/85)  BP(mean): --  RR: 17 (18 Sep 2018 05:07) (16 - 20)  SpO2: 98% (18 Sep 2018 05:07) (98% - 100%)    General: WN/WD NAD  Neurology: A&Ox3  Respiratory: CTA B/L, no wheezes, no rhonchi, no rales  CV: RRR, S1S2, no murmur  Abdominal: Soft, NT, ND no palpable mass, bowel sounds positive  MSK: No edema, + peripheral pulses      Labs:                        8.1    14.4  )-----------( 306      ( 18 Sep 2018 07:44 )             24.7     09-18    143  |  114<H>  |  26<H>  ----------------------------<  170<H>  3.6   |  21<L>  |  0.81    Ca    8.4      18 Sep 2018 07:44  Mg     1.6     09-18    TPro  7.3  /  Alb  2.5<L>  /  TBili  0.2  /  DBili  x   /  AST  17  /  ALT  17  /  AlkPhos  82  09-17

## 2018-09-18 NOTE — PROGRESS NOTE ADULT - SUBJECTIVE AND OBJECTIVE BOX
HPI:  89 year old female, from [facility], AAOx1, with PMHx Dementia, HTN, DM, HLD, Iron deficiency anemia, and Urinary Incontinence sent from NH for reported Hb 6.5 - Patient is very demented and unable to give further details. Grandson at bedside assists w/ HPI - patient reportedly had Hg 9 the week prior - Grandson endorses poor PO intake, otherwise denies abdominal pain, NVDC, change in habits, anticoagulants, urinary symptoms, or any other complaints. In the ED patient seen resting and occasionally screaming - Patient was recently admitted syncopal episode and found to be anemic (Hb: 7.1), FOBT (+), underwent EGD which was negative for active bleeding, found to have bezoar in stomach. 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 GOC discussed at length with steven To - confirmed DNR/DNI. (17 Sep 2018 18:36)      Patient is a 89y old  Female who presents with a chief complaint of anemia (18 Sep 2018 12:24)      INTERVAL HPI/OVERNIGHT EVENTS:  T(C): 36.7 (09-18-18 @ 05:07), Max: 37.1 (09-17-18 @ 17:59)  HR: 109 (09-18-18 @ 05:07) (98 - 120)  BP: 132/79 (09-18-18 @ 05:07) (105/65 - 147/85)  RR: 17 (09-18-18 @ 05:07) (16 - 18)  SpO2: 98% (09-18-18 @ 05:07) (98% - 100%)  Wt(kg): --  I&O's Summary      REVIEW OF SYSTEMS: denies fever, chills, SOB, palpitations, chest pain, abdominal pain, nausea, vomitting, diarrhea, constipation, dizziness    MEDICATIONS  (STANDING):  ALPRAZolam 0.25 milliGRAM(s) Oral at bedtime  cyanocobalamin 1000 MICROGram(s) Oral daily  dextrose 5% + sodium chloride 0.9%. 1000 milliLiter(s) (75 mL/Hr) IV Continuous <Continuous>  donepezil 10 milliGRAM(s) Oral at bedtime  DULoxetine 30 milliGRAM(s) Oral daily  folic acid 1 milliGRAM(s) Oral daily  insulin lispro (HumaLOG) corrective regimen sliding scale   SubCutaneous Before meals and at bedtime  pantoprazole  Injectable 40 milliGRAM(s) IV Push two times a day    MEDICATIONS  (PRN):      PHYSICAL EXAM:  GENERAL: NAD, well-groomed, well-developed  HEAD:  Atraumatic, Normocephalic  EYES: EOMI, PERRLA, conjunctiva and sclera clear  ENMT: No tonsillar erythema, exudates, or enlargement; Moist mucous membranes, Good dentition, No lesions  NECK: Supple, No JVD, Normal thyroid  NERVOUS SYSTEM:  Alert & Oriented X3, Good concentration; Motor Strength 5/5 B/L upper and lower extremities; DTRs 2+ intact and symmetric  CHEST/LUNG: Clear to percussion bilaterally; No rales, rhonchi, wheezing, or rubs  HEART: Regular rate and rhythm; No murmurs, rubs, or gallops  ABDOMEN: Soft, Nontender, Nondistended; Bowel sounds present  EXTREMITIES:  2+ Peripheral Pulses, No clubbing, cyanosis, or edema  LYMPH: No lymphadenopathy noted  SKIN: No rashes or lesions  LABS:                        8.1    14.4  )-----------( 306      ( 18 Sep 2018 07:44 )             24.7     09-18    143  |  114<H>  |  26<H>  ----------------------------<  170<H>  3.6   |  21<L>  |  0.81    Ca    8.4      18 Sep 2018 07:44  Mg     1.6     09-18    TPro  7.3  /  Alb  2.5<L>  /  TBili  0.2  /  DBili  x   /  AST  17  /  ALT  17  /  AlkPhos  82  09-17        CAPILLARY BLOOD GLUCOSE      POCT Blood Glucose.: 140 mg/dL (18 Sep 2018 11:27)  POCT Blood Glucose.: 174 mg/dL (18 Sep 2018 07:35)  POCT Blood Glucose.: 127 mg/dL (17 Sep 2018 21:01)

## 2018-09-18 NOTE — CHART NOTE - NSCHARTNOTEFT_GEN_A_CORE
Paged by RN that patient's iv infiltrated. Assessed bedside, right forearm swollen, tender to touch. Reecommend cold compress. Doppler RUE ordered. Will reassess. Primary team to followup in AM.

## 2018-09-18 NOTE — CONSULT NOTE ADULT - SUBJECTIVE AND OBJECTIVE BOX
Patient well known to me form previous admissions. She presents with anemia (likely multifactorial). No evidence of active bleeding. No GI intervention at this time. Palliative consult. Transfuse to keep hemoglobin > 7   Advance diet   Full consult to follow Patient is a 89y old  Female who presents with a chief complaint of anemia (21 Sep 2018 09:58)    Patient is non-verbal. History obtained form HPI    89 year old female, [facility] with PMHx Dementia, HTN, DM, HLD, Iron deficiency anemia, and Urinary Incontinence sent from NH for reported Hb 6.5 - Patient is very demented and unable to give further details. Grandson at bedside assists w/ HPI - patient reportedly had Hg 9 the week prior. On previous admission the patient was found to have a healing clean based ulcer in the antrum and diverticulosis on her colonoscopy.  She had an extended visit last admission. PEG was offered and family refused opting for palliative care.   REVIEW OF SYSTEMS  N/A  ___________________________________________________________________________________________  Allergies    No Known Allergies    Intolerances      MEDICATIONS  (STANDING):  ALPRAZolam 0.25 milliGRAM(s) Oral at bedtime  cyanocobalamin 1000 MICROGram(s) Oral daily  dextrose 5% + sodium chloride 0.9%. 1000 milliLiter(s) (75 mL/Hr) IV Continuous <Continuous>  donepezil 10 milliGRAM(s) Oral at bedtime  DULoxetine 30 milliGRAM(s) Oral two times a day  ferrous    sulfate 325 milliGRAM(s) Oral two times a day  folic acid 1 milliGRAM(s) Oral daily  insulin lispro (HumaLOG) corrective regimen sliding scale   SubCutaneous Before meals and at bedtime  iron sucrose IVPB 200 milliGRAM(s) IV Intermittent every 24 hours  melatonin 3 milliGRAM(s) Oral once  pantoprazole  Injectable 40 milliGRAM(s) IV Push two times a day    MEDICATIONS  (PRN):      PAST MEDICAL & SURGICAL HISTORY:  Dementia  Hyperchylomicronemia  Hypertension, unspecified type  Type 2 diabetes mellitus without complication, without long-term current use of insulin  No significant past surgical history    FAMILY HISTORY:  Family history of hyperlipidemia  Family history of diabetes mellitus (Sibling)    Social History: No history of : Tobacco use, IVDA, EToH  ______________________________________________________________________________________    PHYSICAL EXAM    Daily     Daily   BMI: 26.9 (09-17 @ 12:46)  Change in Weight:  Vital Signs Last 24 Hrs  T(C): 37 (21 Sep 2018 05:30), Max: 37 (21 Sep 2018 05:30)  T(F): 98.6 (21 Sep 2018 05:30), Max: 98.6 (21 Sep 2018 05:30)  HR: 132 (21 Sep 2018 05:30) (105 - 132)  BP: 133/89 (21 Sep 2018 05:30) (105/124 - 166/91)  BP(mean): --  RR: 16 (21 Sep 2018 05:30) (16 - 18)  SpO2: 97% (21 Sep 2018 05:30) (97% - 100%)    General:  , NAD.  Lymph Nodes:  No lymphadenopathy.   Cardiovascular:  RRR normal S1/S2, no murmur.  Respiratory:  CTA B/L, normal respiratory effort.   Abdominal:   soft, no masses or tenderness, normoactive BS, NT/ND, no HSM.  Extremities:   No clubbing or cyanosis, normal capillary refill, no edema.   Skin:   No rash, jaundice, lesions, eczema.     _______________________________________________________________________________________________  Lab Results:                          8.4    11.9  )-----------( 288      ( 20 Sep 2018 07:03 )             26.6     09-20    139  |  109<H>  |  11  ----------------------------<  129<H>  3.9   |  23  |  0.76    Ca    8.5      20 Sep 2018 07:03                Stool Results:          RADIOLOGY RESULTS:    SURGICAL PATHOLOGY:

## 2018-09-19 DIAGNOSIS — D64.9 ANEMIA, UNSPECIFIED: ICD-10-CM

## 2018-09-19 DIAGNOSIS — G30.1 ALZHEIMER'S DISEASE WITH LATE ONSET: ICD-10-CM

## 2018-09-19 DIAGNOSIS — E43 UNSPECIFIED SEVERE PROTEIN-CALORIE MALNUTRITION: ICD-10-CM

## 2018-09-19 DIAGNOSIS — R53.2 FUNCTIONAL QUADRIPLEGIA: ICD-10-CM

## 2018-09-19 LAB
GLUCOSE BLDC GLUCOMTR-MCNC: 138 MG/DL — HIGH (ref 70–99)
GLUCOSE BLDC GLUCOMTR-MCNC: 150 MG/DL — HIGH (ref 70–99)
GLUCOSE BLDC GLUCOMTR-MCNC: 177 MG/DL — HIGH (ref 70–99)
GLUCOSE BLDC GLUCOMTR-MCNC: 204 MG/DL — HIGH (ref 70–99)
HCT VFR BLD CALC: 24.9 % — LOW (ref 34.5–45)
HGB BLD-MCNC: 8.1 G/DL — LOW (ref 11.5–15.5)
MCHC RBC-ENTMCNC: 29.1 PG — SIGNIFICANT CHANGE UP (ref 27–34)
MCHC RBC-ENTMCNC: 32.6 GM/DL — SIGNIFICANT CHANGE UP (ref 32–36)
MCV RBC AUTO: 89.3 FL — SIGNIFICANT CHANGE UP (ref 80–100)
PLATELET # BLD AUTO: 297 K/UL — SIGNIFICANT CHANGE UP (ref 150–400)
RBC # BLD: 2.79 M/UL — LOW (ref 3.8–5.2)
RBC # FLD: 16.4 % — HIGH (ref 10.3–14.5)
WBC # BLD: 13.7 K/UL — HIGH (ref 3.8–10.5)
WBC # FLD AUTO: 13.7 K/UL — HIGH (ref 3.8–10.5)

## 2018-09-19 PROCEDURE — 99223 1ST HOSP IP/OBS HIGH 75: CPT

## 2018-09-19 PROCEDURE — 99222 1ST HOSP IP/OBS MODERATE 55: CPT

## 2018-09-19 RX ORDER — DULOXETINE HYDROCHLORIDE 30 MG/1
30 CAPSULE, DELAYED RELEASE ORAL
Qty: 0 | Refills: 0 | Status: DISCONTINUED | OUTPATIENT
Start: 2018-09-19 | End: 2018-09-27

## 2018-09-19 RX ORDER — POTASSIUM CHLORIDE 20 MEQ
10 PACKET (EA) ORAL
Qty: 0 | Refills: 0 | Status: COMPLETED | OUTPATIENT
Start: 2018-09-19 | End: 2018-09-19

## 2018-09-19 RX ORDER — ACETAMINOPHEN 500 MG
650 TABLET ORAL ONCE
Qty: 0 | Refills: 0 | Status: COMPLETED | OUTPATIENT
Start: 2018-09-19 | End: 2018-09-19

## 2018-09-19 RX ORDER — DULOXETINE HYDROCHLORIDE 30 MG/1
30 CAPSULE, DELAYED RELEASE ORAL DAILY
Qty: 0 | Refills: 0 | Status: DISCONTINUED | OUTPATIENT
Start: 2018-09-19 | End: 2018-09-19

## 2018-09-19 RX ORDER — DULOXETINE HYDROCHLORIDE 30 MG/1
30 CAPSULE, DELAYED RELEASE ORAL ONCE
Qty: 0 | Refills: 0 | Status: COMPLETED | OUTPATIENT
Start: 2018-09-19 | End: 2018-09-19

## 2018-09-19 RX ADMIN — DULOXETINE HYDROCHLORIDE 30 MILLIGRAM(S): 30 CAPSULE, DELAYED RELEASE ORAL at 22:01

## 2018-09-19 RX ADMIN — PREGABALIN 1000 MICROGRAM(S): 225 CAPSULE ORAL at 11:31

## 2018-09-19 RX ADMIN — Medication 100 MILLIEQUIVALENT(S): at 13:45

## 2018-09-19 RX ADMIN — Medication 2: at 17:01

## 2018-09-19 RX ADMIN — Medication 100 MILLIEQUIVALENT(S): at 17:02

## 2018-09-19 RX ADMIN — DONEPEZIL HYDROCHLORIDE 10 MILLIGRAM(S): 10 TABLET, FILM COATED ORAL at 22:01

## 2018-09-19 RX ADMIN — Medication 650 MILLIGRAM(S): at 22:01

## 2018-09-19 RX ADMIN — DULOXETINE HYDROCHLORIDE 30 MILLIGRAM(S): 30 CAPSULE, DELAYED RELEASE ORAL at 11:31

## 2018-09-19 RX ADMIN — Medication 1 MILLIGRAM(S): at 11:31

## 2018-09-19 RX ADMIN — PANTOPRAZOLE SODIUM 40 MILLIGRAM(S): 20 TABLET, DELAYED RELEASE ORAL at 17:02

## 2018-09-19 RX ADMIN — PANTOPRAZOLE SODIUM 40 MILLIGRAM(S): 20 TABLET, DELAYED RELEASE ORAL at 05:44

## 2018-09-19 RX ADMIN — SODIUM CHLORIDE 75 MILLILITER(S): 9 INJECTION, SOLUTION INTRAVENOUS at 05:44

## 2018-09-19 RX ADMIN — Medication 100 MILLIEQUIVALENT(S): at 12:30

## 2018-09-19 RX ADMIN — Medication 1: at 12:03

## 2018-09-19 RX ADMIN — Medication 650 MILLIGRAM(S): at 22:31

## 2018-09-19 RX ADMIN — Medication 0.25 MILLIGRAM(S): at 22:01

## 2018-09-19 NOTE — PROGRESS NOTE ADULT - PROBLEM SELECTOR PLAN 1
P/w anemia w/ differentials including iron deficient anemia as per prior anemia panel but also include GI ulcers  - FOBT +ve but rectal exam showed hard brown stool  - Prior EGD clean based ulcer; unlikely source of bleeding  - c/w full liquid diet and IV PPI 40 mg BID  - Transfusion 1 unit of pRBC  GI Dr Garzon  -h/h stable post transfusion  -will check q12  -seems to be possibly transient bleeding? diverticular bleed?  -Dr. Garzon says nothing more can be done  Palliative consult  Will get surgery consult for pancreatic mass

## 2018-09-19 NOTE — CONSULT NOTE ADULT - PROBLEM SELECTOR RECOMMENDATION 5
Spoke with patient's HCP/Yousuf - he will be here today at 4pm to further discuss patient's status, goals and discharge plan. Patient and family known to Palliative Care team. Spoke with patient's HCP/Yousuf - he will be here today at 4pm to further discuss patient's status, goals and discharge plan. Patient and family known to Palliative Care team. Spoke with patient's HCP/Yousuf - he will be here tomorrow 9/20 3pm to further discuss patient's status, goals and discharge plan. He acknowledges patient has no remaining STEPAN days and the plan was to take patient home. Will continue to follow. DNR / DNI on file.

## 2018-09-19 NOTE — CONSULT NOTE ADULT - NSHPATTENDINGPLANDISCUSS_GEN_ALL_CORE
Dr. Izquierdo, primary care team,  for Dr. Villagran Dr. Izquierdo, primary care team, social work; LM for Dr. Villagran

## 2018-09-19 NOTE — CONSULT NOTE ADULT - PROBLEM SELECTOR RECOMMENDATION 4
2/2 advancing dementia. Pt now A&O x 1, nonambulatory, dependent on all ADLs, incontinent, ongoing poor PO intake; recurrent hospitalizations. Pt requires 24 hr care - may benefit from home hospice.

## 2018-09-19 NOTE — CONSULT NOTE ADULT - PROBLEM SELECTOR RECOMMENDATION 3
Per GI, pt with multifactorial anemia, no evidence of active bleeding - no GI intervention at this time.

## 2018-09-19 NOTE — PROGRESS NOTE ADULT - SUBJECTIVE AND OBJECTIVE BOX
Patient is a 89y old  Female who presents with a chief complaint of anemia (18 Sep 2018 13:29)      Overnight Events: None    REVIEW OF SYSTEMS:    CONSTITUTIONAL: No weakness, fevers or chills  RESPIRATORY: No cough, wheezing, hemoptysis; No shortness of breath  CARDIOVASCULAR: No chest pain or palpitations  GASTROINTESTINAL: No abdominal or epigastric pain. No nausea, vomiting, or hematemesis; No diarrhea or constipation. No melena or hematochezia.  NEUROLOGICAL: No numbness or weakness  All other review of systems is negative unless indicated above.    MEDICATIONS  (STANDING):  ALPRAZolam 0.25 milliGRAM(s) Oral at bedtime  cyanocobalamin 1000 MICROGram(s) Oral daily  dextrose 5% + sodium chloride 0.9%. 1000 milliLiter(s) (75 mL/Hr) IV Continuous <Continuous>  donepezil 10 milliGRAM(s) Oral at bedtime  DULoxetine 30 milliGRAM(s) Oral daily  folic acid 1 milliGRAM(s) Oral daily  insulin lispro (HumaLOG) corrective regimen sliding scale   SubCutaneous Before meals and at bedtime  pantoprazole  Injectable 40 milliGRAM(s) IV Push two times a day    MEDICATIONS  (PRN):    Vital Signs Last 24 Hrs  T(C): 36.7 (19 Sep 2018 05:10), Max: 37.1 (18 Sep 2018 14:18)  T(F): 98.1 (19 Sep 2018 05:10), Max: 98.7 (18 Sep 2018 14:18)  HR: 97 (19 Sep 2018 05:10) (97 - 117)  BP: 163/87 (19 Sep 2018 05:10) (147/92 - 163/87)  BP(mean): --  RR: 18 (19 Sep 2018 05:10) (17 - 18)  SpO2: 100% (19 Sep 2018 05:10) (97% - 100%)    General: WN/WD NAD  Neurology: arousable not orientated  Respiratory: CTA B/L, no wheezes, no rhonchi, no rales  CV: RRR, S1S2, no murmur  Abdominal: Soft, NT, ND no palpable mass, bowel sounds positive  MSK: No edema, + peripheral pulses      Labs:                        8.1    14.4  )-----------( 306      ( 18 Sep 2018 07:44 )             24.7     09-18    143  |  114<H>  |  26<H>  ----------------------------<  170<H>  3.6   |  21<L>  |  0.81    Ca    8.4      18 Sep 2018 07:44  Mg     1.6     09-18    TPro  7.3  /  Alb  2.5<L>  /  TBili  0.2  /  DBili  x   /  AST  17  /  ALT  17  /  AlkPhos  82  09-17

## 2018-09-19 NOTE — PROGRESS NOTE ADULT - SUBJECTIVE AND OBJECTIVE BOX
HPI:  89 year old female, from [facility], AAOx1, with PMHx Dementia, HTN, DM, HLD, Iron deficiency anemia, and Urinary Incontinence sent from NH for reported Hb 6.5 - Patient is very demented and unable to give further details. Grandson at bedside assists w/ HPI - patient reportedly had Hg 9 the week prior - Grandson endorses poor PO intake, otherwise denies abdominal pain, NVDC, change in habits, anticoagulants, urinary symptoms, or any other complaints. In the ED patient seen resting and occasionally screaming - Patient was recently admitted syncopal episode and found to be anemic (Hb: 7.1), FOBT (+), underwent EGD which was negative for active bleeding, found to have bezoar in stomach. 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 GOC discussed at length with steven To - confirmed DNR/DNI. (17 Sep 2018 18:36)      Patient is a 89y old  Female who presents with a chief complaint of anemia (19 Sep 2018 11:56)      INTERVAL HPI/OVERNIGHT EVENTS:  T(C): 36.7 (09-19-18 @ 05:10), Max: 37.1 (09-18-18 @ 14:18)  HR: 97 (09-19-18 @ 05:10) (97 - 117)  BP: 163/87 (09-19-18 @ 05:10) (147/92 - 163/87)  RR: 18 (09-19-18 @ 05:10) (17 - 18)  SpO2: 100% (09-19-18 @ 05:10) (97% - 100%)  Wt(kg): --  I&O's Summary      REVIEW OF SYSTEMS: denies fever, chills, SOB, palpitations, chest pain, abdominal pain, nausea, vomitting, diarrhea, constipation, dizziness    MEDICATIONS  (STANDING):  ALPRAZolam 0.25 milliGRAM(s) Oral at bedtime  cyanocobalamin 1000 MICROGram(s) Oral daily  dextrose 5% + sodium chloride 0.9%. 1000 milliLiter(s) (75 mL/Hr) IV Continuous <Continuous>  donepezil 10 milliGRAM(s) Oral at bedtime  DULoxetine 30 milliGRAM(s) Oral daily  folic acid 1 milliGRAM(s) Oral daily  insulin lispro (HumaLOG) corrective regimen sliding scale   SubCutaneous Before meals and at bedtime  pantoprazole  Injectable 40 milliGRAM(s) IV Push two times a day  potassium chloride  10 mEq/100 mL IVPB 10 milliEquivalent(s) IV Intermittent every 1 hour    MEDICATIONS  (PRN):      PHYSICAL EXAM:  GENERAL: NAD, well-groomed, well-developed  HEAD:  Atraumatic, Normocephalic  EYES: EOMI, PERRLA, conjunctiva and sclera clear  ENMT: No tonsillar erythema, exudates, or enlargement; Moist mucous membranes, Good dentition, No lesions  NECK: Supple, No JVD, Normal thyroid  NERVOUS SYSTEM:  Alert & Oriented X3, Good concentration; Motor Strength 5/5 B/L upper and lower extremities; DTRs 2+ intact and symmetric  CHEST/LUNG: Clear to percussion bilaterally; No rales, rhonchi, wheezing, or rubs  HEART: Regular rate and rhythm; No murmurs, rubs, or gallops  ABDOMEN: Soft, Nontender, Nondistended; Bowel sounds present  EXTREMITIES:  2+ Peripheral Pulses, No clubbing, cyanosis, or edema  LYMPH: No lymphadenopathy noted  SKIN: No rashes or lesions  LABS:                        8.1    13.7  )-----------( 297      ( 19 Sep 2018 10:44 )             24.9     09-18    143  |  114<H>  |  26<H>  ----------------------------<  170<H>  3.6   |  21<L>  |  0.81    Ca    8.4      18 Sep 2018 07:44  Mg     1.6     09-18    TPro  7.3  /  Alb  2.5<L>  /  TBili  0.2  /  DBili  x   /  AST  17  /  ALT  17  /  AlkPhos  82  09-17        CAPILLARY BLOOD GLUCOSE      POCT Blood Glucose.: 177 mg/dL (19 Sep 2018 11:37)  POCT Blood Glucose.: 138 mg/dL (19 Sep 2018 07:41)  POCT Blood Glucose.: 169 mg/dL (18 Sep 2018 22:00)  POCT Blood Glucose.: 225 mg/dL (18 Sep 2018 16:45)

## 2018-09-19 NOTE — CONSULT NOTE ADULT - SUBJECTIVE AND OBJECTIVE BOX
HPI:  89 year old female, from [facility], AAOx1, with PMHx Dementia, HTN, DM, HLD, Iron deficiency anemia, and Urinary Incontinence sent from NH for reported Hb 6.5 - Patient is very demented and unable to give further details. Grandson at bedside assists w/ HPI - patient reportedly had Hg 9 the week prior - Grandson endorses poor PO intake, otherwise denies abdominal pain, NVDC, change in habits, anticoagulants, urinary symptoms, or any other complaints. In the ED patient seen resting and occasionally screaming - Patient was recently admitted syncopal episode and found to be anemic (Hb: 7.1), FOBT (+), underwent EGD which was negative for active bleeding, found to have bezoar in stomach. 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 GOC discussed at length with steven To - confirmed DNR/DNI. (17 Sep 2018 18:36).    Per report, pt with anemia, no evidence of active bleed - no GI intervention. Request to further discuss goals of care and hospice eval.     PAST MEDICAL & SURGICAL HISTORY:  Dementia  Hyperchylomicronemia  Hypertension, unspecified type  Type 2 diabetes mellitus without complication, without long-term current use of insulin  No significant past surgical history      SOCIAL HISTORY:    Admitted from:  [facility]   Voodoo:    Judaism                                Preferred Language: English    Surrogate/HCP/Guardian: oYusuf Cason (HCP): [phone]    FAMILY HISTORY:  Family history of hyperlipidemia  Family history of diabetes mellitus (Sibling)    Baseline ADLs (prior to admission): Per NH report, pt nonambulatory, wheel chair bound, now total care, A&O x 1, incontinent, poor PO intake; behaviors include screaming/yelling    No Known Allergies    Present Symptoms:          Review of Systems: Unable to obtain due to poor mentation    MEDICATIONS  (STANDING):  ALPRAZolam 0.25 milliGRAM(s) Oral at bedtime  cyanocobalamin 1000 MICROGram(s) Oral daily  dextrose 5% + sodium chloride 0.9%. 1000 milliLiter(s) (75 mL/Hr) IV Continuous <Continuous>  donepezil 10 milliGRAM(s) Oral at bedtime  DULoxetine 30 milliGRAM(s) Oral daily  folic acid 1 milliGRAM(s) Oral daily  insulin lispro (HumaLOG) corrective regimen sliding scale   SubCutaneous Before meals and at bedtime  pantoprazole  Injectable 40 milliGRAM(s) IV Push two times a day    MEDICATIONS  (PRN):      PHYSICAL EXAM:    Vital Signs Last 24 Hrs  T(C): 36.7 (19 Sep 2018 05:10), Max: 37.1 (18 Sep 2018 14:18)  T(F): 98.1 (19 Sep 2018 05:10), Max: 98.7 (18 Sep 2018 14:18)  HR: 97 (19 Sep 2018 05:10) (97 - 117)  BP: 163/87 (19 Sep 2018 05:10) (147/92 - 163/87)  BP(mean): --  RR: 18 (19 Sep 2018 05:10) (17 - 18)  SpO2: 100% (19 Sep 2018 05:10) (97% - 100%)    General: pt awake, nonverbal, unable to follow simple commands   Karnofsky Performance Score/Palliative Performance Status Version2:     30%    HEENT: +temporal wasting  Lungs: comfortable    CV:  tachycardia  GI: incontinent  :   incontinent    Musculoskeletal: non-ambulatory, dependent on ADLs, wheel chair bound  Neuro:   cognitive impairment dysphagia  Oral intake ability: minimal  Diet: full liquid    LABS:                        8.1    13.7  )-----------( 297      ( 19 Sep 2018 10:44 )             24.9     09-18    143  |  114<H>  |  26<H>  ----------------------------<  170<H>  3.6   |  21<L>  |  0.81    Ca    8.4      18 Sep 2018 07:44  Mg     1.6     09-18    TPro  7.3  /  Alb  2.5<L>  /  TBili  0.2  /  DBili  x   /  AST  17  /  ALT  17  /  AlkPhos  82  09-17    Albumin: 2.5      RADIOLOGY & ADDITIONAL STUDIES: reviewed    ADVANCE DIRECTIVES:   Advanced Care Planning discussion total time spent:

## 2018-09-19 NOTE — CONSULT NOTE ADULT - PROBLEM SELECTOR RECOMMENDATION 9
FAST 7C. Per NH report, pt now A&O x 1, nonambulatory, requires total care, incontinent x 2; ongoing poor PO intake; behaviors include screaming/yelling. Pt requires 24 hr care - may benefit from home hospice. Patient's grandson/Yousuf is HCP. FAST 7C. Per NH report, pt now A&O x 1, nonambulatory, requires total care, incontinent x 2; ongoing poor PO intake; behaviors include screaming/yelling. Pt requires 24 hr care - may benefit from home hospice pending HCP's wishes. Patient's grandson/Yousuf is HCP - family meeting planned for tomorrow 3pm. DNR /DNI on file.

## 2018-09-19 NOTE — CONSULT NOTE ADULT - PROBLEM SELECTOR RECOMMENDATION 2
2/2 advancing dementia; dx per dietary on previous admission. Albumin: 2.5. NH reports pt with ongoing poor PO intake. On previous admission, patient's HCP acknowledged patient likely to pull out feeding tube if placed and requested NO PEG but continue with comfort foods. No feeding tube per previous MOLST.

## 2018-09-20 LAB
ANION GAP SERPL CALC-SCNC: 7 MMOL/L — SIGNIFICANT CHANGE UP (ref 5–17)
BUN SERPL-MCNC: 11 MG/DL — SIGNIFICANT CHANGE UP (ref 7–18)
CALCIUM SERPL-MCNC: 8.5 MG/DL — SIGNIFICANT CHANGE UP (ref 8.4–10.5)
CHLORIDE SERPL-SCNC: 109 MMOL/L — HIGH (ref 96–108)
CO2 SERPL-SCNC: 23 MMOL/L — SIGNIFICANT CHANGE UP (ref 22–31)
CREAT SERPL-MCNC: 0.76 MG/DL — SIGNIFICANT CHANGE UP (ref 0.5–1.3)
GLUCOSE BLDC GLUCOMTR-MCNC: 147 MG/DL — HIGH (ref 70–99)
GLUCOSE BLDC GLUCOMTR-MCNC: 161 MG/DL — HIGH (ref 70–99)
GLUCOSE BLDC GLUCOMTR-MCNC: 194 MG/DL — HIGH (ref 70–99)
GLUCOSE BLDC GLUCOMTR-MCNC: 199 MG/DL — HIGH (ref 70–99)
GLUCOSE SERPL-MCNC: 129 MG/DL — HIGH (ref 70–99)
HCT VFR BLD CALC: 26.6 % — LOW (ref 34.5–45)
HGB BLD-MCNC: 8.4 G/DL — LOW (ref 11.5–15.5)
MCHC RBC-ENTMCNC: 28.7 PG — SIGNIFICANT CHANGE UP (ref 27–34)
MCHC RBC-ENTMCNC: 31.6 GM/DL — LOW (ref 32–36)
MCV RBC AUTO: 90.7 FL — SIGNIFICANT CHANGE UP (ref 80–100)
PLATELET # BLD AUTO: 288 K/UL — SIGNIFICANT CHANGE UP (ref 150–400)
POTASSIUM SERPL-MCNC: 3.9 MMOL/L — SIGNIFICANT CHANGE UP (ref 3.5–5.3)
POTASSIUM SERPL-SCNC: 3.9 MMOL/L — SIGNIFICANT CHANGE UP (ref 3.5–5.3)
RBC # BLD: 2.93 M/UL — LOW (ref 3.8–5.2)
RBC # FLD: 17.9 % — HIGH (ref 10.3–14.5)
SODIUM SERPL-SCNC: 139 MMOL/L — SIGNIFICANT CHANGE UP (ref 135–145)
WBC # BLD: 11.9 K/UL — HIGH (ref 3.8–10.5)
WBC # FLD AUTO: 11.9 K/UL — HIGH (ref 3.8–10.5)

## 2018-09-20 PROCEDURE — 99233 SBSQ HOSP IP/OBS HIGH 50: CPT

## 2018-09-20 PROCEDURE — 99497 ADVNCD CARE PLAN 30 MIN: CPT

## 2018-09-20 PROCEDURE — 99232 SBSQ HOSP IP/OBS MODERATE 35: CPT

## 2018-09-20 RX ORDER — FERROUS SULFATE 325(65) MG
325 TABLET ORAL
Qty: 0 | Refills: 0 | Status: DISCONTINUED | OUTPATIENT
Start: 2018-09-20 | End: 2018-09-27

## 2018-09-20 RX ORDER — IRON SUCROSE 20 MG/ML
200 INJECTION, SOLUTION INTRAVENOUS EVERY 24 HOURS
Qty: 0 | Refills: 0 | Status: COMPLETED | OUTPATIENT
Start: 2018-09-20 | End: 2018-09-22

## 2018-09-20 RX ADMIN — Medication 1 MILLIGRAM(S): at 13:12

## 2018-09-20 RX ADMIN — PANTOPRAZOLE SODIUM 40 MILLIGRAM(S): 20 TABLET, DELAYED RELEASE ORAL at 06:00

## 2018-09-20 RX ADMIN — DULOXETINE HYDROCHLORIDE 30 MILLIGRAM(S): 30 CAPSULE, DELAYED RELEASE ORAL at 06:00

## 2018-09-20 RX ADMIN — DONEPEZIL HYDROCHLORIDE 10 MILLIGRAM(S): 10 TABLET, FILM COATED ORAL at 21:35

## 2018-09-20 RX ADMIN — Medication 0.25 MILLIGRAM(S): at 21:35

## 2018-09-20 RX ADMIN — PREGABALIN 1000 MICROGRAM(S): 225 CAPSULE ORAL at 13:12

## 2018-09-20 RX ADMIN — PANTOPRAZOLE SODIUM 40 MILLIGRAM(S): 20 TABLET, DELAYED RELEASE ORAL at 17:04

## 2018-09-20 RX ADMIN — Medication 1: at 13:12

## 2018-09-20 RX ADMIN — Medication 325 MILLIGRAM(S): at 17:04

## 2018-09-20 RX ADMIN — Medication 1: at 21:35

## 2018-09-20 RX ADMIN — IRON SUCROSE 110 MILLIGRAM(S): 20 INJECTION, SOLUTION INTRAVENOUS at 17:03

## 2018-09-20 RX ADMIN — DULOXETINE HYDROCHLORIDE 30 MILLIGRAM(S): 30 CAPSULE, DELAYED RELEASE ORAL at 17:04

## 2018-09-20 NOTE — PROGRESS NOTE ADULT - PROBLEM SELECTOR PLAN 2
2/2 advancing dementia; dx per dietary on previous admission. Albumin: 2.5. NH reports pt with ongoing poor PO intake. Ongoing poor PO intake during hospitalization - ate approx 30% this morning per report. On previous admission, patient's HCP acknowledged patient likely to pull out feeding tube if placed and requested NO PEG but continue with comfort foods. 2/2 advancing dementia; dx per dietary on previous admission. Albumin: 2.5. NH reports pt with ongoing poor PO intake. Ongoing poor PO intake during hospitalization - ate approx 30% this morning per report. Patient's HCP again acknowledged patient likely to pull out feeding tube if placed and requested NO PEG but continue with comfort foods.

## 2018-09-20 NOTE — PROGRESS NOTE ADULT - SUBJECTIVE AND OBJECTIVE BOX
HPI:  89 year old female, from [facility], AAOx1, with PMHx Dementia, HTN, DM, HLD, Iron deficiency anemia, and Urinary Incontinence sent from NH for reported Hb 6.5 - Patient is very demented and unable to give further details. Grandson at bedside assists w/ HPI - patient reportedly had Hg 9 the week prior - Grandson endorses poor PO intake, otherwise denies abdominal pain, NVDC, change in habits, anticoagulants, urinary symptoms, or any other complaints. In the ED patient seen resting and occasionally screaming - Patient was recently admitted syncopal episode and found to be anemic (Hb: 7.1), FOBT (+), underwent EGD which was negative for active bleeding, found to have bezoar in stomach. 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 GOC discussed at length with steven To - confirmed DNR/DNI. (17 Sep 2018 18:36)      Patient is a 89y old  Female who presents with a chief complaint of anemia (20 Sep 2018 10:48)      INTERVAL HPI/OVERNIGHT EVENTS:  T(C): 36.7 (09-20-18 @ 05:03), Max: 37.2 (09-19-18 @ 22:26)  HR: 90 (09-20-18 @ 05:03) (90 - 110)  BP: 139/73 (09-20-18 @ 05:03) (139/73 - 148/77)  RR: 16 (09-20-18 @ 05:03) (16 - 18)  SpO2: 100% (09-20-18 @ 05:03) (100% - 100%)  Wt(kg): --  I&O's Summary      REVIEW OF SYSTEMS: denies fever, chills, SOB, palpitations, chest pain, abdominal pain, nausea, vomitting, diarrhea, constipation, dizziness    MEDICATIONS  (STANDING):  ALPRAZolam 0.25 milliGRAM(s) Oral at bedtime  cyanocobalamin 1000 MICROGram(s) Oral daily  dextrose 5% + sodium chloride 0.9%. 1000 milliLiter(s) (75 mL/Hr) IV Continuous <Continuous>  donepezil 10 milliGRAM(s) Oral at bedtime  DULoxetine 30 milliGRAM(s) Oral two times a day  folic acid 1 milliGRAM(s) Oral daily  insulin lispro (HumaLOG) corrective regimen sliding scale   SubCutaneous Before meals and at bedtime  pantoprazole  Injectable 40 milliGRAM(s) IV Push two times a day    MEDICATIONS  (PRN):      PHYSICAL EXAM:  GENERAL: NAD, well-groomed, well-developed  HEAD:  Atraumatic, Normocephalic  EYES: EOMI, PERRLA, conjunctiva and sclera clear  ENMT: No tonsillar erythema, exudates, or enlargement; Moist mucous membranes, Good dentition, No lesions  NECK: Supple, No JVD, Normal thyroid  NERVOUS SYSTEM:  Alert & Oriented X3, Good concentration; Motor Strength 5/5 B/L upper and lower extremities; DTRs 2+ intact and symmetric  CHEST/LUNG: Clear to percussion bilaterally; No rales, rhonchi, wheezing, or rubs  HEART: Regular rate and rhythm; No murmurs, rubs, or gallops  ABDOMEN: Soft, Nontender, Nondistended; Bowel sounds present  EXTREMITIES:  2+ Peripheral Pulses, No clubbing, cyanosis, or edema  LYMPH: No lymphadenopathy noted  SKIN: No rashes or lesions  LABS:                        8.4    11.9  )-----------( 288      ( 20 Sep 2018 07:03 )             26.6     09-20    139  |  109<H>  |  11  ----------------------------<  129<H>  3.9   |  23  |  0.76    Ca    8.5      20 Sep 2018 07:03          CAPILLARY BLOOD GLUCOSE      POCT Blood Glucose.: 147 mg/dL (20 Sep 2018 07:46)  POCT Blood Glucose.: 150 mg/dL (19 Sep 2018 21:21)  POCT Blood Glucose.: 204 mg/dL (19 Sep 2018 16:18)

## 2018-09-20 NOTE — PROGRESS NOTE ADULT - PROBLEM SELECTOR PLAN 1
FAST 7C. Per NH report, pt now A&O x 1, nonambulatory, requires total care, incontinent x 2; ongoing poor PO intake; behaviors include screaming/yelling. Pt requires 24 hr care - may benefit from home hospice pending HCP's wishes. Patient's grandson/Yousuf is HCP. DNR /DNI on file. FAST 7C. Per NH report, pt now A&O x 1, nonambulatory, requires total care, incontinent x 2; ongoing poor PO intake; behaviors include screaming/yelling. Pt requires 24 hr care. Patient's grandson/Yousuf is HCP - agreeable with home hospice services. MOLST completed: DNR / DNI / DNH.

## 2018-09-20 NOTE — PROGRESS NOTE ADULT - SUBJECTIVE AND OBJECTIVE BOX
OVERNIGHT EVENTS: no acute events; ongoing poor PO intake    Present Symptoms:    Review of Systems: Unable to obtain due to poor mentation    MEDICATIONS  (STANDING):  ALPRAZolam 0.25 milliGRAM(s) Oral at bedtime  cyanocobalamin 1000 MICROGram(s) Oral daily  dextrose 5% + sodium chloride 0.9%. 1000 milliLiter(s) (75 mL/Hr) IV Continuous <Continuous>  donepezil 10 milliGRAM(s) Oral at bedtime  DULoxetine 30 milliGRAM(s) Oral two times a day  folic acid 1 milliGRAM(s) Oral daily  insulin lispro (HumaLOG) corrective regimen sliding scale   SubCutaneous Before meals and at bedtime  pantoprazole  Injectable 40 milliGRAM(s) IV Push two times a day    MEDICATIONS  (PRN):      PHYSICAL EXAM:  Vital Signs Last 24 Hrs  T(C): 36.7 (20 Sep 2018 05:03), Max: 37.2 (19 Sep 2018 22:26)  T(F): 98 (20 Sep 2018 05:03), Max: 98.9 (19 Sep 2018 22:26)  HR: 90 (20 Sep 2018 05:03) (90 - 110)  BP: 139/73 (20 Sep 2018 05:03) (139/73 - 148/77)  BP(mean): --  RR: 16 (20 Sep 2018 05:03) (16 - 18)  SpO2: 100% (20 Sep 2018 05:03) (100% - 100%)  General: pt slept thru exam, nonverbal, unable to follow simple commands   Karnofsky Performance Score/Palliative Performance Status Version2:     30%    HEENT: +temporal wasting  Lungs: comfortable    CV:  tachycardia  GI: incontinent  :   incontinent    Musculoskeletal: non-ambulatory, dependent on ADLs, wheel chair bound  Neuro:   cognitive impairment dysphagia  Oral intake ability: minimal  Diet: full liquid      LABS:                          8.4    11.9  )-----------( 288      ( 20 Sep 2018 07:03 )             26.6     09-20    139  |  109<H>  |  11  ----------------------------<  129<H>  3.9   |  23  |  0.76    Ca    8.5      20 Sep 2018 07:03          RADIOLOGY & ADDITIONAL STUDIES: reviewed    ADVANCE DIRECTIVES:  Advanced Care Planning discussion total time spent: OVERNIGHT EVENTS: no acute events; ongoing poor PO intake    Present Symptoms:    Review of Systems: Unable to obtain due to poor mentation    MEDICATIONS  (STANDING):  ALPRAZolam 0.25 milliGRAM(s) Oral at bedtime  cyanocobalamin 1000 MICROGram(s) Oral daily  dextrose 5% + sodium chloride 0.9%. 1000 milliLiter(s) (75 mL/Hr) IV Continuous <Continuous>  donepezil 10 milliGRAM(s) Oral at bedtime  DULoxetine 30 milliGRAM(s) Oral two times a day  folic acid 1 milliGRAM(s) Oral daily  insulin lispro (HumaLOG) corrective regimen sliding scale   SubCutaneous Before meals and at bedtime  pantoprazole  Injectable 40 milliGRAM(s) IV Push two times a day    MEDICATIONS  (PRN):      PHYSICAL EXAM:  Vital Signs Last 24 Hrs  T(C): 36.7 (20 Sep 2018 05:03), Max: 37.2 (19 Sep 2018 22:26)  T(F): 98 (20 Sep 2018 05:03), Max: 98.9 (19 Sep 2018 22:26)  HR: 90 (20 Sep 2018 05:03) (90 - 110)  BP: 139/73 (20 Sep 2018 05:03) (139/73 - 148/77)  BP(mean): --  RR: 16 (20 Sep 2018 05:03) (16 - 18)  SpO2: 100% (20 Sep 2018 05:03) (100% - 100%)  General: pt slept thru exam, nonverbal, unable to follow simple commands   Karnofsky Performance Score/Palliative Performance Status Version2:     30%    HEENT: +temporal wasting  Lungs: comfortable    CV:  tachycardia  GI: incontinent  :   incontinent    Musculoskeletal: non-ambulatory, dependent on ADLs, wheel chair bound  Neuro:   cognitive impairment dysphagia  Oral intake ability: minimal  Diet: full liquid      LABS:                          8.4    11.9  )-----------( 288      ( 20 Sep 2018 07:03 )             26.6     09-20    139  |  109<H>  |  11  ----------------------------<  129<H>  3.9   |  23  |  0.76    Ca    8.5      20 Sep 2018 07:03          RADIOLOGY & ADDITIONAL STUDIES: reviewed    ADVANCE DIRECTIVES: DNR / MOLST forms completed as per HCP's wishes: DNR / DNI / DNH / no feeding tube / trial IV fluids / determine use or limitation of abx / No more blood transfusions / goal of care is comfort. For home hospice.   Advanced Care Planning discussion total time spent:  +30 minutes. Met with patient's grandson/HCP, Yousuf, face to face. Dr. Segovia present. Discussed status. HCP acknowledges continued decline. Discussed risks vs benefits of resuscitation, intubation, artificial nutrition, transfusions. MOLST completed as per son's wishes: DNR / DNI / DNH / no feeding tube / trial IV fluids / determine use or limitation of abx / No more blood transfusions / goal of care is comfort. For home hospice.

## 2018-09-20 NOTE — PROGRESS NOTE ADULT - PROBLEM SELECTOR PLAN 4
2/2 advancing dementia. Pt now A&O x 1, nonambulatory, dependent on all ADLs, incontinent, ongoing poor PO intake; recurrent hospitalizations. Pt requires 24 hr care - may benefit from home hospice. 2/2 advancing dementia. Pt now A&O x 1, nonambulatory, dependent on all ADLs, incontinent, ongoing poor PO intake; recurrent hospitalizations. Pt requires 24 hr care; agreeable with home hospice.

## 2018-09-20 NOTE — PROGRESS NOTE ADULT - PROBLEM SELECTOR PLAN 3
Per GI, pt with multifactorial anemia, no evidence of active bleeding - no GI intervention at this time. Per GI, pt with multifactorial anemia, no evidence of active bleeding - no GI intervention at this time. HCP acknowledges recurrent hospitalizations 2/2 anemia - now requesting no further blood transfusions; goal of care is comfort.

## 2018-09-20 NOTE — PROGRESS NOTE ADULT - PROBLEM SELECTOR PLAN 1
P/w anemia w/ differentials including iron deficient anemia as per prior anemia panel but also include GI ulcers  - FOBT +ve but rectal exam showed hard brown stool  - Prior EGD clean based ulcer; unlikely source of bleeding  - c/w full liquid diet and IV PPI 40 mg BID  - Transfusion 1 unit of pRBC  GI Dr Garzon  -h/h stable post transfusion  -will check q12  -seems to be possibly transient bleeding? diverticular bleed?  -Dr. Garzon says nothing more can be done  H/H now stable  Palliative consult noted  -family discussion today

## 2018-09-21 LAB
GLUCOSE BLDC GLUCOMTR-MCNC: 100 MG/DL — HIGH (ref 70–99)
GLUCOSE BLDC GLUCOMTR-MCNC: 136 MG/DL — HIGH (ref 70–99)
GLUCOSE BLDC GLUCOMTR-MCNC: 152 MG/DL — HIGH (ref 70–99)
GLUCOSE BLDC GLUCOMTR-MCNC: 219 MG/DL — HIGH (ref 70–99)

## 2018-09-21 PROCEDURE — 99232 SBSQ HOSP IP/OBS MODERATE 35: CPT

## 2018-09-21 RX ORDER — LANOLIN ALCOHOL/MO/W.PET/CERES
3 CREAM (GRAM) TOPICAL ONCE
Qty: 0 | Refills: 0 | Status: COMPLETED | OUTPATIENT
Start: 2018-09-21 | End: 2018-09-21

## 2018-09-21 RX ORDER — HALOPERIDOL DECANOATE 100 MG/ML
0.5 INJECTION INTRAMUSCULAR ONCE
Qty: 0 | Refills: 0 | Status: COMPLETED | OUTPATIENT
Start: 2018-09-21 | End: 2018-09-21

## 2018-09-21 RX ADMIN — PANTOPRAZOLE SODIUM 40 MILLIGRAM(S): 20 TABLET, DELAYED RELEASE ORAL at 06:21

## 2018-09-21 RX ADMIN — HALOPERIDOL DECANOATE 0.5 MILLIGRAM(S): 100 INJECTION INTRAMUSCULAR at 06:21

## 2018-09-21 RX ADMIN — Medication 1 MILLIGRAM(S): at 14:19

## 2018-09-21 RX ADMIN — Medication 2: at 16:27

## 2018-09-21 RX ADMIN — Medication 3 MILLIGRAM(S): at 22:14

## 2018-09-21 RX ADMIN — DONEPEZIL HYDROCHLORIDE 10 MILLIGRAM(S): 10 TABLET, FILM COATED ORAL at 22:14

## 2018-09-21 RX ADMIN — IRON SUCROSE 110 MILLIGRAM(S): 20 INJECTION, SOLUTION INTRAVENOUS at 12:08

## 2018-09-21 RX ADMIN — PREGABALIN 1000 MICROGRAM(S): 225 CAPSULE ORAL at 14:19

## 2018-09-21 RX ADMIN — DULOXETINE HYDROCHLORIDE 30 MILLIGRAM(S): 30 CAPSULE, DELAYED RELEASE ORAL at 06:20

## 2018-09-21 RX ADMIN — PANTOPRAZOLE SODIUM 40 MILLIGRAM(S): 20 TABLET, DELAYED RELEASE ORAL at 18:16

## 2018-09-21 RX ADMIN — Medication 325 MILLIGRAM(S): at 18:16

## 2018-09-21 RX ADMIN — Medication 1: at 12:07

## 2018-09-21 RX ADMIN — Medication 0.25 MILLIGRAM(S): at 22:13

## 2018-09-21 RX ADMIN — Medication 325 MILLIGRAM(S): at 06:20

## 2018-09-21 RX ADMIN — DULOXETINE HYDROCHLORIDE 30 MILLIGRAM(S): 30 CAPSULE, DELAYED RELEASE ORAL at 18:16

## 2018-09-21 NOTE — PROGRESS NOTE ADULT - SUBJECTIVE AND OBJECTIVE BOX
Subjective:     No change in status   Objective:    MEDICATIONS  (STANDING):  ALPRAZolam 0.25 milliGRAM(s) Oral at bedtime  cyanocobalamin 1000 MICROGram(s) Oral daily  dextrose 5% + sodium chloride 0.9%. 1000 milliLiter(s) (75 mL/Hr) IV Continuous <Continuous>  donepezil 10 milliGRAM(s) Oral at bedtime  DULoxetine 30 milliGRAM(s) Oral two times a day  ferrous    sulfate 325 milliGRAM(s) Oral two times a day  folic acid 1 milliGRAM(s) Oral daily  insulin lispro (HumaLOG) corrective regimen sliding scale   SubCutaneous Before meals and at bedtime  iron sucrose IVPB 200 milliGRAM(s) IV Intermittent every 24 hours  melatonin 3 milliGRAM(s) Oral once  pantoprazole  Injectable 40 milliGRAM(s) IV Push two times a day    MEDICATIONS  (PRN):              Vital Signs Last 24 Hrs  T(C): 37 (21 Sep 2018 05:30), Max: 37 (21 Sep 2018 05:30)  T(F): 98.6 (21 Sep 2018 05:30), Max: 98.6 (21 Sep 2018 05:30)  HR: 132 (21 Sep 2018 05:30) (105 - 132)  BP: 133/89 (21 Sep 2018 05:30) (105/124 - 166/91)  BP(mean): --  RR: 16 (21 Sep 2018 05:30) (16 - 18)  SpO2: 97% (21 Sep 2018 05:30) (97% - 100%)      General:   NAD.  Cardiovascular:  RRR normal S1/S2, no murmur.  Respiratory:  CTA B/L, normal respiratory effort.   Abdominal:   soft, no masses or tenderness, normoactive BS, NT/ND, no HSM.  Extremities:   No clubbing or cyanosis, normal capillary refill, no edema.       LABS:                        8.4    11.9  )-----------( 288      ( 20 Sep 2018 07:03 )             26.6     09-20    139  |  109<H>  |  11  ----------------------------<  129<H>  3.9   |  23  |  0.76    Ca    8.5      20 Sep 2018 07:03            RADIOLOGY & ADDITIONAL TESTS:

## 2018-09-21 NOTE — PROGRESS NOTE ADULT - PROBLEM SELECTOR PLAN 1
P/w anemia w/ differentials including iron deficient anemia as per prior anemia panel but also include GI ulcers  - FOBT +ve but rectal exam showed hard brown stool  - Prior EGD clean based ulcer; unlikely source of bleeding  - c/w full liquid diet and IV PPI 40 mg BID  - Transfusion 1 unit of pRBC  GI Dr Garzon  -h/h stable post transfusion  -will check q12  -seems to be possibly transient bleeding? diverticular bleed?  -Dr. Garzon says nothing more can be done  H/H now stable  Palliative consult noted  -patient to go for home hospice  -needs social work and case management to work on discharge

## 2018-09-21 NOTE — PROGRESS NOTE ADULT - SUBJECTIVE AND OBJECTIVE BOX
HPI:  89 year old female, from [facility], AAOx1, with PMHx Dementia, HTN, DM, HLD, Iron deficiency anemia, and Urinary Incontinence sent from NH for reported Hb 6.5 - Patient is very demented and unable to give further details. Grandson at bedside assists w/ HPI - patient reportedly had Hg 9 the week prior - Grandson endorses poor PO intake, otherwise denies abdominal pain, NVDC, change in habits, anticoagulants, urinary symptoms, or any other complaints. In the ED patient seen resting and occasionally screaming - Patient was recently admitted syncopal episode and found to be anemic (Hb: 7.1), FOBT (+), underwent EGD which was negative for active bleeding, found to have bezoar in stomach. 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 GOC discussed at length with steven To - confirmed DNR/DNI. (17 Sep 2018 18:36)      Patient is a 89y old  Female who presents with a chief complaint of anemia (21 Sep 2018 10:41)      INTERVAL HPI/OVERNIGHT EVENTS:  T(C): 37.1 (09-21-18 @ 14:27), Max: 37.1 (09-21-18 @ 14:27)  HR: 110 (09-21-18 @ 14:27) (107 - 132)  BP: 128/85 (09-21-18 @ 14:27) (128/85 - 166/91)  RR: 18 (09-21-18 @ 14:27) (16 - 18)  SpO2: 96% (09-21-18 @ 14:27) (96% - 100%)  Wt(kg): --  I&O's Summary      REVIEW OF SYSTEMS: denies fever, chills, SOB, palpitations, chest pain, abdominal pain, nausea, vomitting, diarrhea, constipation, dizziness    MEDICATIONS  (STANDING):  ALPRAZolam 0.25 milliGRAM(s) Oral at bedtime  cyanocobalamin 1000 MICROGram(s) Oral daily  dextrose 5% + sodium chloride 0.9%. 1000 milliLiter(s) (75 mL/Hr) IV Continuous <Continuous>  donepezil 10 milliGRAM(s) Oral at bedtime  DULoxetine 30 milliGRAM(s) Oral two times a day  ferrous    sulfate 325 milliGRAM(s) Oral two times a day  folic acid 1 milliGRAM(s) Oral daily  insulin lispro (HumaLOG) corrective regimen sliding scale   SubCutaneous Before meals and at bedtime  iron sucrose IVPB 200 milliGRAM(s) IV Intermittent every 24 hours  melatonin 3 milliGRAM(s) Oral once  pantoprazole  Injectable 40 milliGRAM(s) IV Push two times a day    MEDICATIONS  (PRN):      PHYSICAL EXAM:  GENERAL: NAD, well-groomed, well-developed  HEAD:  Atraumatic, Normocephalic  EYES: EOMI, PERRLA, conjunctiva and sclera clear  ENMT: No tonsillar erythema, exudates, or enlargement; Moist mucous membranes, Good dentition, No lesions  NECK: Supple, No JVD, Normal thyroid  NERVOUS SYSTEM:  Alert & Oriented X3, Good concentration; Motor Strength 5/5 B/L upper and lower extremities; DTRs 2+ intact and symmetric  CHEST/LUNG: Clear to percussion bilaterally; No rales, rhonchi, wheezing, or rubs  HEART: Regular rate and rhythm; No murmurs, rubs, or gallops  ABDOMEN: Soft, Nontender, Nondistended; Bowel sounds present  EXTREMITIES:  2+ Peripheral Pulses, No clubbing, cyanosis, or edema  LYMPH: No lymphadenopathy noted  SKIN: No rashes or lesions  LABS:                        8.4    11.9  )-----------( 288      ( 20 Sep 2018 07:03 )             26.6     09-20    139  |  109<H>  |  11  ----------------------------<  129<H>  3.9   |  23  |  0.76    Ca    8.5      20 Sep 2018 07:03          CAPILLARY BLOOD GLUCOSE      POCT Blood Glucose.: 219 mg/dL (21 Sep 2018 16:06)  POCT Blood Glucose.: 152 mg/dL (21 Sep 2018 11:13)  POCT Blood Glucose.: 136 mg/dL (21 Sep 2018 07:43)  POCT Blood Glucose.: 161 mg/dL (20 Sep 2018 20:55)  POCT Blood Glucose.: 194 mg/dL (20 Sep 2018 16:50)

## 2018-09-21 NOTE — CHART NOTE - NSCHARTNOTEFT_GEN_A_CORE
Focus Note   89yFemalePatient is a 89y old  Female who presents with a chief complaint of anemia (21 Sep 2018 10:41)      Factors impacting intake: [ ] none [ ] nausea  [ ] vomiting [ ] diarrhea [ ] constipation  [ ]chewing problems [ ] swallowing issues  [X ] other: Dementia, anemia, multiple GI bleeding    Diet Presciption: Diet, Full Liquid (- @ 18:52)    Intake: Visited pt. very confused with 1:1 observation, per PCA appetite fair, intake 40-50% of full liquid diet & tolerating. GI consult noted, family agreed to hospice care, awaiting transfer, followed by Palliative care team & , recommend comfort supportive care as medically feasible, RD available.    Daily Weight in k.4 (19 Sep 2018 05:10)    % Weight ChangeN/A    Pertinent Medications: MEDICATIONS  (STANDING):  ALPRAZolam 0.25 milliGRAM(s) Oral at bedtime  cyanocobalamin 1000 MICROGram(s) Oral daily  dextrose 5% + sodium chloride 0.9%. 1000 milliLiter(s) (75 mL/Hr) IV Continuous <Continuous>  donepezil 10 milliGRAM(s) Oral at bedtime  DULoxetine 30 milliGRAM(s) Oral two times a day  ferrous    sulfate 325 milliGRAM(s) Oral two times a day  folic acid 1 milliGRAM(s) Oral daily  insulin lispro (HumaLOG) corrective regimen sliding scale   SubCutaneous Before meals and at bedtime  iron sucrose IVPB 200 milliGRAM(s) IV Intermittent every 24 hours  melatonin 3 milliGRAM(s) Oral once  pantoprazole  Injectable 40 milliGRAM(s) IV Push two times a day    MEDICATIONS  (PRN):    Pertinent Labs:  Na139 mmol/L Glu 129 mg/dL<H> K+ 3.9 mmol/L Cr  0.76 mg/dL BUN 11 mg/dL  Alb 2.5 g/dL<L>  [id number] 5.7 %<H>     CAPILLARY BLOOD GLUCOSE      POCT Blood Glucose.: 136 mg/dL (21 Sep 2018 07:43)  POCT Blood Glucose.: 161 mg/dL (20 Sep 2018 20:55)  POCT Blood Glucose.: 194 mg/dL (20 Sep 2018 16:50)  POCT Blood Glucose.: 199 mg/dL (20 Sep 2018 12:51)    Skin:     Estimated Needs:   [ ] no change since previous assessment  [ ] recalculated:     Previous Nutrition Diagnosis:   [ ] Inadequate Energy Intake [ ]Inadequate Oral Intake [ ] Excessive Energy Intake   [ ] Underweight [ ] Increased Nutrient Needs [ ] Overweight/Obesity   [ ] Altered GI Function [ ] Unintended Weight Loss [ ] Food & Nutrition Related Knowledge Deficit [ Severe] Malnutrition     Nutrition Diagnosis is [X ] ongoing  [ ] resolved [ ] not applicable     New Nutrition Diagnosis: [ ] not applicable       Interventions: Rec. comfort/supportive care as medically feasible   Recommend  [ ] Change Diet To:  [ ] Nutrition Supplement  [ ] Nutrition Support  [ ] Other:     Monitoring and Evaluation:   [ ] PO intake [ x ] Tolerance to diet prescription [ x ] weights [ x ] labs[ x ] follow up per protocol  [ X] other:

## 2018-09-21 NOTE — PROGRESS NOTE ADULT - SUBJECTIVE AND OBJECTIVE BOX
Patient is a 89y old  Female who presents with a chief complaint of anemia (20 Sep 2018 12:37)      Overnight Events: None    REVIEW OF SYSTEMS:    CONSTITUTIONAL: No weakness, fevers or chills  RESPIRATORY: No cough, wheezing, hemoptysis; No shortness of breath  CARDIOVASCULAR: No chest pain or palpitations  GASTROINTESTINAL: No abdominal or epigastric pain. No nausea, vomiting, or hematemesis; No diarrhea or constipation. No melena or hematochezia.  NEUROLOGICAL: No numbness or weakness  All other review of systems is negative unless indicated above.    MEDICATIONS  (STANDING):  ALPRAZolam 0.25 milliGRAM(s) Oral at bedtime  cyanocobalamin 1000 MICROGram(s) Oral daily  dextrose 5% + sodium chloride 0.9%. 1000 milliLiter(s) (75 mL/Hr) IV Continuous <Continuous>  donepezil 10 milliGRAM(s) Oral at bedtime  DULoxetine 30 milliGRAM(s) Oral two times a day  ferrous    sulfate 325 milliGRAM(s) Oral two times a day  folic acid 1 milliGRAM(s) Oral daily  insulin lispro (HumaLOG) corrective regimen sliding scale   SubCutaneous Before meals and at bedtime  iron sucrose IVPB 200 milliGRAM(s) IV Intermittent every 24 hours  melatonin 3 milliGRAM(s) Oral once  pantoprazole  Injectable 40 milliGRAM(s) IV Push two times a day    MEDICATIONS  (PRN):    Vital Signs Last 24 Hrs  T(C): 37 (21 Sep 2018 05:30), Max: 37 (21 Sep 2018 05:30)  T(F): 98.6 (21 Sep 2018 05:30), Max: 98.6 (21 Sep 2018 05:30)  HR: 132 (21 Sep 2018 05:30) (105 - 132)  BP: 133/89 (21 Sep 2018 05:30) (105/124 - 166/91)  BP(mean): --  RR: 16 (21 Sep 2018 05:30) (16 - 18)  SpO2: 97% (21 Sep 2018 05:30) (97% - 100%)    General: WN/WD NAD  Neurology: A&Ox3  Respiratory: CTA B/L, no wheezes, no rhonchi, no rales  CV: RRR, S1S2, no murmur  Abdominal: Soft, NT, ND no palpable mass, bowel sounds positive  MSK: No edema, + peripheral pulses      Labs:                        8.4    11.9  )-----------( 288      ( 20 Sep 2018 07:03 )             26.6     09-20    139  |  109<H>  |  11  ----------------------------<  129<H>  3.9   |  23  |  0.76    Ca    8.5      20 Sep 2018 07:03

## 2018-09-22 LAB
GLUCOSE BLDC GLUCOMTR-MCNC: 138 MG/DL — HIGH (ref 70–99)
GLUCOSE BLDC GLUCOMTR-MCNC: 152 MG/DL — HIGH (ref 70–99)
GLUCOSE BLDC GLUCOMTR-MCNC: 195 MG/DL — HIGH (ref 70–99)
GLUCOSE BLDC GLUCOMTR-MCNC: 205 MG/DL — HIGH (ref 70–99)

## 2018-09-22 RX ORDER — LANOLIN ALCOHOL/MO/W.PET/CERES
3 CREAM (GRAM) TOPICAL AT BEDTIME
Qty: 0 | Refills: 0 | Status: DISCONTINUED | OUTPATIENT
Start: 2018-09-22 | End: 2018-09-27

## 2018-09-22 RX ADMIN — Medication 325 MILLIGRAM(S): at 05:56

## 2018-09-22 RX ADMIN — Medication 3 MILLIGRAM(S): at 22:19

## 2018-09-22 RX ADMIN — Medication 2: at 17:07

## 2018-09-22 RX ADMIN — DULOXETINE HYDROCHLORIDE 30 MILLIGRAM(S): 30 CAPSULE, DELAYED RELEASE ORAL at 17:07

## 2018-09-22 RX ADMIN — IRON SUCROSE 110 MILLIGRAM(S): 20 INJECTION, SOLUTION INTRAVENOUS at 12:44

## 2018-09-22 RX ADMIN — Medication 1: at 11:49

## 2018-09-22 RX ADMIN — Medication 0.25 MILLIGRAM(S): at 22:26

## 2018-09-22 RX ADMIN — DONEPEZIL HYDROCHLORIDE 10 MILLIGRAM(S): 10 TABLET, FILM COATED ORAL at 22:19

## 2018-09-22 RX ADMIN — DULOXETINE HYDROCHLORIDE 30 MILLIGRAM(S): 30 CAPSULE, DELAYED RELEASE ORAL at 05:55

## 2018-09-22 RX ADMIN — PREGABALIN 1000 MICROGRAM(S): 225 CAPSULE ORAL at 12:44

## 2018-09-22 RX ADMIN — Medication 1 MILLIGRAM(S): at 12:44

## 2018-09-22 RX ADMIN — Medication 1: at 22:21

## 2018-09-22 RX ADMIN — Medication 325 MILLIGRAM(S): at 17:07

## 2018-09-22 RX ADMIN — PANTOPRAZOLE SODIUM 40 MILLIGRAM(S): 20 TABLET, DELAYED RELEASE ORAL at 17:07

## 2018-09-22 RX ADMIN — PANTOPRAZOLE SODIUM 40 MILLIGRAM(S): 20 TABLET, DELAYED RELEASE ORAL at 05:56

## 2018-09-22 NOTE — PROGRESS NOTE ADULT - SUBJECTIVE AND OBJECTIVE BOX
seen and examined  vs stable afebrile  physical unchanged   awake  trying to talk  but confused  awaiting for home hospice

## 2018-09-23 LAB
GLUCOSE BLDC GLUCOMTR-MCNC: 150 MG/DL — HIGH (ref 70–99)
GLUCOSE BLDC GLUCOMTR-MCNC: 160 MG/DL — HIGH (ref 70–99)
GLUCOSE BLDC GLUCOMTR-MCNC: 169 MG/DL — HIGH (ref 70–99)
GLUCOSE BLDC GLUCOMTR-MCNC: 209 MG/DL — HIGH (ref 70–99)

## 2018-09-23 RX ORDER — HALOPERIDOL DECANOATE 100 MG/ML
0.5 INJECTION INTRAMUSCULAR ONCE
Qty: 0 | Refills: 0 | Status: COMPLETED | OUTPATIENT
Start: 2018-09-23 | End: 2018-09-23

## 2018-09-23 RX ADMIN — Medication 325 MILLIGRAM(S): at 05:37

## 2018-09-23 RX ADMIN — Medication 3 MILLIGRAM(S): at 21:27

## 2018-09-23 RX ADMIN — HALOPERIDOL DECANOATE 0.5 MILLIGRAM(S): 100 INJECTION INTRAMUSCULAR at 03:25

## 2018-09-23 RX ADMIN — DONEPEZIL HYDROCHLORIDE 10 MILLIGRAM(S): 10 TABLET, FILM COATED ORAL at 21:27

## 2018-09-23 RX ADMIN — DULOXETINE HYDROCHLORIDE 30 MILLIGRAM(S): 30 CAPSULE, DELAYED RELEASE ORAL at 05:37

## 2018-09-23 RX ADMIN — PREGABALIN 1000 MICROGRAM(S): 225 CAPSULE ORAL at 11:28

## 2018-09-23 RX ADMIN — DULOXETINE HYDROCHLORIDE 30 MILLIGRAM(S): 30 CAPSULE, DELAYED RELEASE ORAL at 17:17

## 2018-09-23 RX ADMIN — Medication 0.25 MILLIGRAM(S): at 21:27

## 2018-09-23 RX ADMIN — Medication 1 MILLIGRAM(S): at 11:28

## 2018-09-23 RX ADMIN — Medication 1: at 21:27

## 2018-09-23 RX ADMIN — Medication 2: at 16:40

## 2018-09-23 RX ADMIN — PANTOPRAZOLE SODIUM 40 MILLIGRAM(S): 20 TABLET, DELAYED RELEASE ORAL at 05:37

## 2018-09-23 RX ADMIN — Medication 325 MILLIGRAM(S): at 17:17

## 2018-09-23 RX ADMIN — Medication 1: at 11:28

## 2018-09-23 RX ADMIN — PANTOPRAZOLE SODIUM 40 MILLIGRAM(S): 20 TABLET, DELAYED RELEASE ORAL at 17:17

## 2018-09-23 NOTE — PROGRESS NOTE ADULT - SUBJECTIVE AND OBJECTIVE BOX
PGY1 Note discussed with supervising resident and primary attending.    Patient is a 89y old  Female who presents with a chief complaint of anemia (22 Sep 2018 16:34)      INTERVAL HPI/OVERNIGHT EVENTS: No overnight events. Pt awaiting home hospice care.    MEDICATIONS  (STANDING):  ALPRAZolam 0.25 milliGRAM(s) Oral at bedtime  cyanocobalamin 1000 MICROGram(s) Oral daily  dextrose 5% + sodium chloride 0.9%. 1000 milliLiter(s) (75 mL/Hr) IV Continuous <Continuous>  donepezil 10 milliGRAM(s) Oral at bedtime  DULoxetine 30 milliGRAM(s) Oral two times a day  ferrous    sulfate 325 milliGRAM(s) Oral two times a day  folic acid 1 milliGRAM(s) Oral daily  insulin lispro (HumaLOG) corrective regimen sliding scale   SubCutaneous Before meals and at bedtime  melatonin 3 milliGRAM(s) Oral at bedtime  pantoprazole  Injectable 40 milliGRAM(s) IV Push two times a day    MEDICATIONS  (PRN):      Allergies    No Known Allergies    Intolerances        REVIEW OF SYSTEMS:  CONSTITUTIONAL: No fever, weight loss, or fatigue  RESPIRATORY: No cough, wheezing, chills or hemoptysis; No shortness of breath  CARDIOVASCULAR: No chest pain, palpitations, dizziness, or leg swelling  GASTROINTESTINAL: No abdominal or epigastric pain. No nausea, vomiting, or hematemesis; No diarrhea or constipation. No melena or hematochezia.  NEUROLOGICAL: No headaches, memory loss, loss of strength, numbness, or tremors  SKIN: No itching, burning, rashes, or lesions     Vital Signs Last 24 Hrs  T(C): 36.6 (22 Sep 2018 20:38), Max: 36.7 (22 Sep 2018 13:49)  T(F): 97.9 (22 Sep 2018 20:38), Max: 98.1 (22 Sep 2018 13:49)  HR: 68 (22 Sep 2018 20:38) (68 - 118)  BP: 113/67 (22 Sep 2018 20:38) (113/67 - 146/86)  BP(mean): --  RR: 18 (22 Sep 2018 20:38) (18 - 18)  SpO2: 99% (22 Sep 2018 20:38) (99% - 100%)    PHYSICAL EXAM:  GENERAL: NAD, well-groomed, well-developed  HEAD:  Atraumatic, Normocephalic  EYES: EOMI, PERRLA, conjunctiva and sclera clear  NECK: Supple, No JVD, Normal thyroid  CHEST/LUNG: Clear to percussion bilaterally; No rales, rhonchi, wheezing, or rubs  HEART: Regular rate and rhythm; No murmurs, rubs, or gallops  ABDOMEN: Soft, Nontender, Nondistended; Bowel sounds present  NERVOUS SYSTEM:  Alert & Oriented X3, Good concentration; Motor Strength 5/5 B/L   EXTREMITIES:  2+ Peripheral Pulses, No clubbing, cyanosis, or edema  SKIN;    LABS:              CAPILLARY BLOOD GLUCOSE      POCT Blood Glucose.: 152 mg/dL (22 Sep 2018 21:22)  POCT Blood Glucose.: 205 mg/dL (22 Sep 2018 16:18)  POCT Blood Glucose.: 195 mg/dL (22 Sep 2018 11:29)  POCT Blood Glucose.: 138 mg/dL (22 Sep 2018 07:59)        Imaging Personally Reviewed:  [x] YES  [ ] NO    Consultant(s) Notes Reviewed:  [x] YES  [ ] NO

## 2018-09-23 NOTE — PROGRESS NOTE ADULT - SUBJECTIVE AND OBJECTIVE BOX
HPI:  89 year old female, from [facility], AAOx1, with PMHx Dementia, HTN, DM, HLD, Iron deficiency anemia, and Urinary Incontinence sent from NH for reported Hb 6.5 - Patient is very demented and unable to give further details. Steven at bedside assists w/ HPI - patient reportedly had Hg 9 the week prior - Grandson endorses poor PO intake, otherwise denies abdominal pain, NVDC, change in habits, anticoagulants, urinary symptoms, or any other complaints. In the ED patient seen resting and occasionally screaming - Patient was recently admitted syncopal episode and found to be anemic (Hb: 7.1), FOBT (+), underwent EGD which was negative for active bleeding, found to have bezoar in stomach. 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 GOC discussed at length with steven To - confirmed DNR/DNI. (17 Sep 2018 18:36)      Patient is a 89y old  Female who presents with a chief complaint of anemia (23 Sep 2018 02:58)      INTERVAL HPI/OVERNIGHT EVENTS:  T(C): 36.7 (09-23-18 @ 05:11), Max: 36.7 (09-22-18 @ 13:49)  HR: 102 (09-23-18 @ 05:11) (68 - 118)  BP: 144/88 (09-23-18 @ 05:11) (113/67 - 144/88)  RR: 18 (09-23-18 @ 05:11) (18 - 18)  SpO2: 99% (09-23-18 @ 05:11) (99% - 100%)  Wt(kg): --  I&O's Summary    22 Sep 2018 07:01  -  23 Sep 2018 07:00  --------------------------------------------------------  IN: 500 mL / OUT: 2 mL / NET: 498 mL        REVIEW OF SYSTEMS: denies fever, chills, SOB, palpitations, chest pain, abdominal pain, nausea, vomitting, diarrhea, constipation, dizziness    MEDICATIONS  (STANDING):  ALPRAZolam 0.25 milliGRAM(s) Oral at bedtime  cyanocobalamin 1000 MICROGram(s) Oral daily  dextrose 5% + sodium chloride 0.9%. 1000 milliLiter(s) (75 mL/Hr) IV Continuous <Continuous>  donepezil 10 milliGRAM(s) Oral at bedtime  DULoxetine 30 milliGRAM(s) Oral two times a day  ferrous    sulfate 325 milliGRAM(s) Oral two times a day  folic acid 1 milliGRAM(s) Oral daily  insulin lispro (HumaLOG) corrective regimen sliding scale   SubCutaneous Before meals and at bedtime  melatonin 3 milliGRAM(s) Oral at bedtime  pantoprazole  Injectable 40 milliGRAM(s) IV Push two times a day    MEDICATIONS  (PRN):      PHYSICAL EXAM:  GENERAL: NAD, well-groomed, well-developed  HEAD:  Atraumatic, Normocephalic  EYES: EOMI, PERRLA, conjunctiva and sclera clear  ENMT: No tonsillar erythema, exudates, or enlargement; Moist mucous membranes, Good dentition, No lesions  NECK: Supple, No JVD, Normal thyroid  NERVOUS SYSTEM:  Alert & Oriented X3, Good concentration; Motor Strength 5/5 B/L upper and lower extremities; DTRs 2+ intact and symmetric  CHEST/LUNG: Clear to percussion bilaterally; No rales, rhonchi, wheezing, or rubs  HEART: Regular rate and rhythm; No murmurs, rubs, or gallops  ABDOMEN: Soft, Nontender, Nondistended; Bowel sounds present  EXTREMITIES:  2+ Peripheral Pulses, No clubbing, cyanosis, or edema  LYMPH: No lymphadenopathy noted  SKIN: No rashes or lesions  LABS:              CAPILLARY BLOOD GLUCOSE      POCT Blood Glucose.: 160 mg/dL (23 Sep 2018 11:21)  POCT Blood Glucose.: 150 mg/dL (23 Sep 2018 07:45)  POCT Blood Glucose.: 152 mg/dL (22 Sep 2018 21:22)  POCT Blood Glucose.: 205 mg/dL (22 Sep 2018 16:18)

## 2018-09-24 ENCOUNTER — TRANSCRIPTION ENCOUNTER (OUTPATIENT)
Age: 83
End: 2018-09-24

## 2018-09-24 LAB
GLUCOSE BLDC GLUCOMTR-MCNC: 117 MG/DL — HIGH (ref 70–99)
GLUCOSE BLDC GLUCOMTR-MCNC: 176 MG/DL — HIGH (ref 70–99)
GLUCOSE BLDC GLUCOMTR-MCNC: 179 MG/DL — HIGH (ref 70–99)
GLUCOSE BLDC GLUCOMTR-MCNC: 212 MG/DL — HIGH (ref 70–99)

## 2018-09-24 RX ORDER — SIMVASTATIN 20 MG/1
1 TABLET, FILM COATED ORAL
Qty: 0 | Refills: 0 | COMMUNITY

## 2018-09-24 RX ORDER — GABAPENTIN 400 MG/1
1 CAPSULE ORAL
Qty: 0 | Refills: 0 | COMMUNITY

## 2018-09-24 RX ORDER — METOCLOPRAMIDE HCL 10 MG
1 TABLET ORAL
Qty: 0 | Refills: 0 | COMMUNITY

## 2018-09-24 RX ORDER — IRON SUCROSE 20 MG/ML
200 INJECTION, SOLUTION INTRAVENOUS EVERY 24 HOURS
Qty: 0 | Refills: 0 | Status: COMPLETED | OUTPATIENT
Start: 2018-09-24 | End: 2018-09-26

## 2018-09-24 RX ORDER — OXYBUTYNIN CHLORIDE 5 MG
1 TABLET ORAL
Qty: 0 | Refills: 0 | COMMUNITY

## 2018-09-24 RX ORDER — PANTOPRAZOLE SODIUM 20 MG/1
40 TABLET, DELAYED RELEASE ORAL
Qty: 0 | Refills: 0 | Status: DISCONTINUED | OUTPATIENT
Start: 2018-09-24 | End: 2018-09-27

## 2018-09-24 RX ADMIN — Medication 0.25 MILLIGRAM(S): at 21:37

## 2018-09-24 RX ADMIN — Medication 325 MILLIGRAM(S): at 17:22

## 2018-09-24 RX ADMIN — Medication 325 MILLIGRAM(S): at 05:45

## 2018-09-24 RX ADMIN — Medication 1: at 11:48

## 2018-09-24 RX ADMIN — PANTOPRAZOLE SODIUM 40 MILLIGRAM(S): 20 TABLET, DELAYED RELEASE ORAL at 05:45

## 2018-09-24 RX ADMIN — Medication 2: at 17:21

## 2018-09-24 RX ADMIN — Medication 3 MILLIGRAM(S): at 21:37

## 2018-09-24 RX ADMIN — Medication 1: at 21:37

## 2018-09-24 RX ADMIN — DONEPEZIL HYDROCHLORIDE 10 MILLIGRAM(S): 10 TABLET, FILM COATED ORAL at 21:37

## 2018-09-24 RX ADMIN — IRON SUCROSE 110 MILLIGRAM(S): 20 INJECTION, SOLUTION INTRAVENOUS at 12:16

## 2018-09-24 RX ADMIN — PREGABALIN 1000 MICROGRAM(S): 225 CAPSULE ORAL at 11:05

## 2018-09-24 RX ADMIN — Medication 1 MILLIGRAM(S): at 11:05

## 2018-09-24 RX ADMIN — DULOXETINE HYDROCHLORIDE 30 MILLIGRAM(S): 30 CAPSULE, DELAYED RELEASE ORAL at 05:45

## 2018-09-24 RX ADMIN — DULOXETINE HYDROCHLORIDE 30 MILLIGRAM(S): 30 CAPSULE, DELAYED RELEASE ORAL at 17:22

## 2018-09-24 NOTE — DISCHARGE NOTE ADULT - PLAN OF CARE
to have management of anemia history of clean based ulcer on endoscopy in the past, continue iron supplements; H&H stable, f/u with PCP within 1 week continue aricept, xanax and cymbalta take metformin as directed no BP meds needed, borderline BP on this admission, follow up with PCP to see if need to start on BP meds home hospice on disposition history of clean based ulcer on endoscopy in the past, continue iron supplements; H&H stable, f/u with PCP within 1 week; c/w gi prophylaxis continue xanax and cymbalta take sliding scale as directed, discontinue metformin

## 2018-09-24 NOTE — PROGRESS NOTE ADULT - SUBJECTIVE AND OBJECTIVE BOX
Patient is a 89y old  Female who presents with a chief complaint of anemia (23 Sep 2018 13:40)    Patient was seen and examined, not reporting any distress. Pending home hospice.    INTERVAL HPI/OVERNIGHT EVENTS:  T(C): 36.8 (09-24-18 @ 05:29), Max: 36.8 (09-23-18 @ 20:14)  HR: 102 (09-24-18 @ 05:29) (102 - 116)  BP: 129/80 (09-24-18 @ 05:29) (124/70 - 149/86)  RR: 18 (09-24-18 @ 05:29) (17 - 18)  SpO2: 100% (09-24-18 @ 05:29) (100% - 100%)    MEDICATIONS  (STANDING):  ALPRAZolam 0.25 milliGRAM(s) Oral at bedtime  cyanocobalamin 1000 MICROGram(s) Oral daily  dextrose 5% + sodium chloride 0.9%. 1000 milliLiter(s) (75 mL/Hr) IV Continuous <Continuous>  donepezil 10 milliGRAM(s) Oral at bedtime  DULoxetine 30 milliGRAM(s) Oral two times a day  ferrous    sulfate 325 milliGRAM(s) Oral two times a day  folic acid 1 milliGRAM(s) Oral daily  insulin lispro (HumaLOG) corrective regimen sliding scale   SubCutaneous Before meals and at bedtime  melatonin 3 milliGRAM(s) Oral at bedtime  pantoprazole  Injectable 40 milliGRAM(s) IV Push two times a day    PHYSICAL EXAM:  GENERAL: NAD, well-groomed, well-developed  HEAD:  Atraumatic, Normocephalic  EYES: EOMI, PERRLA, conjunctiva and sclera clear  ENMT: No tonsillar erythema, exudates, or enlargement; Moist mucous membranes, Good dentition, No lesions  NECK: Supple, No JVD, Normal thyroid  NERVOUS SYSTEM:  Alert & awake, following commands, Motor Strength 5/5 B/L upper and lower extremities; DTRs 2+ intact and symmetric  CHEST/LUNG: Clear to percussion bilaterally; No rales, rhonchi, wheezing, or rubs  HEART: Regular rate and rhythm; No murmurs, rubs, or gallops  ABDOMEN: Soft, Nontender, Nondistended; Bowel sounds present  EXTREMITIES:  2+ Peripheral Pulses, No clubbing, cyanosis, or edema  LYMPH: No lymphadenopathy noted  SKIN: No rashes or lesions    POCT Blood Glucose.: 117 mg/dL (24 Sep 2018 07:42)  POCT Blood Glucose.: 169 mg/dL (23 Sep 2018 21:07)  POCT Blood Glucose.: 209 mg/dL (23 Sep 2018 16:22)  POCT Blood Glucose.: 160 mg/dL (23 Sep 2018 11:21)

## 2018-09-24 NOTE — DISCHARGE NOTE ADULT - HOSPITAL COURSE
89 year old female, from [facility], AAOx1, with PMHx Dementia, HTN, DM, HLD, Iron deficiency anemia, and Urinary Incontinence sent from NH for reported Hb 6.5 - Patient is very demented and unable to give further details. Grandson at bedside assists w/ HPI - patient reportedly had Hg 9 the week prior - Grandson endorses poor PO intake, otherwise denies abdominal pain, NVDC, change in habits, anticoagulants, urinary symptoms, or any other complaints. In the ED patient seen resting and occasionally screaming - Patient was recently admitted syncopal episode and found to be anemic (Hb: 7.1), FOBT (+), underwent EGD which was negative for active bleeding, found to have bezoar in stomach. 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 GOC discussed at length with steven To - confirmed DNR/DNI. (17 Sep 2018 18:36) Was admitted for GI bleed, with anemia on presentation to the ED. Differentials including iron deficieny anemia as per prior anemia panel but also include GI ulcers.  FOBT +ve but rectal exam showed hard brown stool. Prior EGD clean based ulcer; unlikely source of bleeding. Tolerating full diet. Dr. Garzon- no further GI workup. CBC stable post transfusion, seems to be possibly transient bleeding? diverticular bleed? GI signed off. Palliative care consulted, patient is for home hospice. For dementia, on Aricept, Xanax, and Duloxetine. For DM, held Metformin; c/w HSS and monitor FS. Was on SCDs on dvt ppx. 89 year old female, from [facility], AAOx1, with PMHx Dementia, HTN, DM, HLD, Iron deficiency anemia, and Urinary Incontinence sent from NH for reported Hb 6.5 - Patient is very demented and unable to give further details. Grandson at bedside assists w/ HPI - patient reportedly had Hg 9 the week prior - Grandson endorses poor PO intake, otherwise denies abdominal pain, NVDC, change in habits, anticoagulants, urinary symptoms, or any other complaints. In the ED patient seen resting and occasionally screaming - Patient was recently admitted syncopal episode and found to be anemic (Hb: 7.1), FOBT (+), underwent EGD which was negative for active bleeding, found to have bezoar in stomach. 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 GOC discussed at length with steven To - confirmed DNR/DNI. (17 Sep 2018 18:36) Was admitted for GI bleed, with anemia on presentation to the ED. Differentials including iron deficieny anemia as per prior anemia panel but also include GI ulcers.  FOBT +ve but rectal exam showed hard brown stool. Prior EGD clean based ulcer; unlikely source of bleeding. Tolerating full diet. Dr. Garzon- no further GI workup. CBC stable post transfusion, seems to be possibly transient bleeding? diverticular bleed? GI signed off. Palliative care consulted, patient is for home hospice. For dementia, on Aricept, Xanax, and Duloxetine. For DM, held Metformin; c/w HSS and monitor FS. Was on SCDs on dvt ppx. On discharge, will send with meds: cymbalta, xanax, melatonin and ppi.

## 2018-09-24 NOTE — DISCHARGE NOTE ADULT - PATIENT PORTAL LINK FT
You can access the [facility] Patient Portal, offered by [facility], by registering with the following website: [email or web address]

## 2018-09-24 NOTE — DISCHARGE NOTE ADULT - CARE PLAN
Principal Discharge DX:	Anemia, unspecified type  Goal:	to have management of anemia  Assessment and plan of treatment:	history of clean based ulcer on endoscopy in the past, continue iron supplements; H&H stable, f/u with PCP within 1 week  Secondary Diagnosis:	Dementia  Assessment and plan of treatment:	continue aricept, xanax and cymbalta  Secondary Diagnosis:	Type 2 diabetes mellitus without complication, without long-term current use of insulin  Assessment and plan of treatment:	take metformin as directed  Secondary Diagnosis:	Hypertension, unspecified type  Assessment and plan of treatment:	no BP meds needed, borderline BP on this admission, follow up with PCP to see if need to start on BP meds  Secondary Diagnosis:	Palliative care encounter  Assessment and plan of treatment:	home hospice on disposition Principal Discharge DX:	Anemia, unspecified type  Goal:	to have management of anemia  Assessment and plan of treatment:	history of clean based ulcer on endoscopy in the past, continue iron supplements; H&H stable, f/u with PCP within 1 week; c/w gi prophylaxis  Secondary Diagnosis:	Dementia  Assessment and plan of treatment:	continue xanax and cymbalta  Secondary Diagnosis:	Type 2 diabetes mellitus without complication, without long-term current use of insulin  Assessment and plan of treatment:	take metformin as directed  Secondary Diagnosis:	Hypertension, unspecified type  Assessment and plan of treatment:	no BP meds needed, borderline BP on this admission, follow up with PCP to see if need to start on BP meds  Secondary Diagnosis:	Palliative care encounter  Assessment and plan of treatment:	home hospice on disposition Principal Discharge DX:	Anemia, unspecified type  Goal:	to have management of anemia  Assessment and plan of treatment:	history of clean based ulcer on endoscopy in the past, continue iron supplements; H&H stable, f/u with PCP within 1 week; c/w gi prophylaxis  Secondary Diagnosis:	Dementia  Assessment and plan of treatment:	continue xanax and cymbalta  Secondary Diagnosis:	Type 2 diabetes mellitus without complication, without long-term current use of insulin  Assessment and plan of treatment:	take sliding scale as directed, discontinue metformin  Secondary Diagnosis:	Hypertension, unspecified type  Assessment and plan of treatment:	no BP meds needed, borderline BP on this admission, follow up with PCP to see if need to start on BP meds  Secondary Diagnosis:	Palliative care encounter  Assessment and plan of treatment:	home hospice on disposition

## 2018-09-24 NOTE — DISCHARGE NOTE ADULT - MEDICATION SUMMARY - MEDICATIONS TO TAKE
I will START or STAY ON the medications listed below when I get home from the hospital:    Cymbalta 30 mg oral delayed release capsule  -- 1 cap(s) by mouth 2 times a day  -- Indication: For Dementia    metFORMIN 500 mg oral tablet, extended release  -- 1 tab(s) by mouth once a day  -- Indication: For Diabetes    Xanax 0.25 mg oral tablet  -- 1 tab(s) by mouth once a day (at bedtime)  -- Indication: For Dementia    Aricept 10 mg oral tablet  -- 1 tab(s) by mouth once a day (at bedtime)  -- Indication: For Dementia    ferrous sulfate 325 mg (65 mg elemental iron) oral delayed release tablet  -- 1 tab(s) by mouth once a day  -- Indication: For Anemia, unspecified type    Melatonin 3 mg oral tablet  -- 1 tab(s) by mouth once (at bedtime)  -- Indication: For insomnia    omeprazole 40 mg oral delayed release capsule  -- 1 cap(s) by mouth once a day  -- Indication: For GI ppx    folic acid 1 mg oral tablet  -- 1 tab(s) by mouth once a day  -- Indication: For Supplement    Vitamin B-12 500 mcg oral tablet  -- 1 tab(s) by mouth once a day  -- Indication: For Supplement I will START or STAY ON the medications listed below when I get home from the hospital:    Cymbalta 30 mg oral delayed release capsule  -- 1 cap(s) by mouth 2 times a day  -- Indication: For Depression    Xanax 0.25 mg oral tablet  -- 1 tab(s) by mouth once a day (at bedtime) MDD:0.25mg MDD  -- Indication: For insomnia    Melatonin 3 mg oral tablet  -- 1 tab(s) by mouth once (at bedtime) MDD:3mg  -- Indication: For insomnia    omeprazole 40 mg oral delayed release capsule  -- 1 cap(s) by mouth once a day  -- Indication: For Ppi

## 2018-09-24 NOTE — DISCHARGE NOTE ADULT - SECONDARY DIAGNOSIS.
Dementia Type 2 diabetes mellitus without complication, without long-term current use of insulin Hypertension, unspecified type Palliative care encounter

## 2018-09-24 NOTE — DISCHARGE NOTE ADULT - MEDICATION SUMMARY - MEDICATIONS TO STOP TAKING
I will STOP taking the medications listed below when I get home from the hospital:    Zocor 10 mg oral tablet  -- 1 tab(s) by mouth once a day (at bedtime)    oxybutynin 10 mg/24 hr oral tablet, extended release  -- 1 tab(s) by mouth once a day    gabapentin 300 mg oral capsule  -- 1 cap(s) by mouth once a day    metoclopramide 5 mg oral tablet  -- 1 tab(s) by mouth 2 times a day (before meals) I will STOP taking the medications listed below when I get home from the hospital:    Aricept 10 mg oral tablet  -- 1 tab(s) by mouth once a day (at bedtime)    Zocor 10 mg oral tablet  -- 1 tab(s) by mouth once a day (at bedtime)    ferrous sulfate 325 mg (65 mg elemental iron) oral delayed release tablet  -- 1 tab(s) by mouth once a day    metFORMIN 500 mg oral tablet, extended release  -- 1 tab(s) by mouth once a day    oxybutynin 10 mg/24 hr oral tablet, extended release  -- 1 tab(s) by mouth once a day    Vitamin B-12 500 mcg oral tablet  -- 1 tab(s) by mouth once a day    gabapentin 300 mg oral capsule  -- 1 cap(s) by mouth once a day    folic acid 1 mg oral tablet  -- 1 tab(s) by mouth once a day    metoclopramide 5 mg oral tablet  -- 1 tab(s) by mouth 2 times a day (before meals)

## 2018-09-24 NOTE — PROGRESS NOTE ADULT - SUBJECTIVE AND OBJECTIVE BOX
seen and examined  vs stable afebrile  comfortable on bed   physical unchanged   MS as before.  abd soft bs nml no n tender  in IV venofer    awaiting for Home Hospice

## 2018-09-24 NOTE — PROGRESS NOTE ADULT - PROBLEM SELECTOR PLAN 1
P/w anemia w/ differentials including iron deficient anemia as per prior anemia panel but also include GI ulcers  - FOBT +ve but rectal exam showed hard brown stool  - Prior EGD clean based ulcer; unlikely source of bleeding  - c/w full liquid diet and IV PPI 40 mg BID  GI Dr Garzon  -h/h stable post transfusion  -seems to be possibly transient bleeding? diverticular bleed?  -Dr. Garzon- GI signed off  H/H now stable  Palliative consult consulted  -patient to go for home hospice, dc planning

## 2018-09-25 LAB
GLUCOSE BLDC GLUCOMTR-MCNC: 151 MG/DL — HIGH (ref 70–99)
GLUCOSE BLDC GLUCOMTR-MCNC: 155 MG/DL — HIGH (ref 70–99)
GLUCOSE BLDC GLUCOMTR-MCNC: 166 MG/DL — HIGH (ref 70–99)
GLUCOSE BLDC GLUCOMTR-MCNC: 225 MG/DL — HIGH (ref 70–99)
GLUCOSE BLDC GLUCOMTR-MCNC: 249 MG/DL — HIGH (ref 70–99)
GLUCOSE BLDC GLUCOMTR-MCNC: 283 MG/DL — HIGH (ref 70–99)

## 2018-09-25 RX ORDER — PANTOPRAZOLE SODIUM 20 MG/1
40 TABLET, DELAYED RELEASE ORAL ONCE
Qty: 0 | Refills: 0 | Status: COMPLETED | OUTPATIENT
Start: 2018-09-25 | End: 2018-09-25

## 2018-09-25 RX ADMIN — DONEPEZIL HYDROCHLORIDE 10 MILLIGRAM(S): 10 TABLET, FILM COATED ORAL at 22:49

## 2018-09-25 RX ADMIN — Medication 1: at 22:55

## 2018-09-25 RX ADMIN — Medication 325 MILLIGRAM(S): at 05:27

## 2018-09-25 RX ADMIN — Medication 2: at 16:39

## 2018-09-25 RX ADMIN — Medication 3 MILLIGRAM(S): at 22:49

## 2018-09-25 RX ADMIN — Medication 1: at 08:41

## 2018-09-25 RX ADMIN — PREGABALIN 1000 MICROGRAM(S): 225 CAPSULE ORAL at 14:04

## 2018-09-25 RX ADMIN — Medication 3: at 14:05

## 2018-09-25 RX ADMIN — Medication 1 MILLIGRAM(S): at 14:03

## 2018-09-25 RX ADMIN — Medication 325 MILLIGRAM(S): at 18:52

## 2018-09-25 RX ADMIN — IRON SUCROSE 110 MILLIGRAM(S): 20 INJECTION, SOLUTION INTRAVENOUS at 14:03

## 2018-09-25 RX ADMIN — DULOXETINE HYDROCHLORIDE 30 MILLIGRAM(S): 30 CAPSULE, DELAYED RELEASE ORAL at 05:27

## 2018-09-25 RX ADMIN — PANTOPRAZOLE SODIUM 40 MILLIGRAM(S): 20 TABLET, DELAYED RELEASE ORAL at 05:27

## 2018-09-25 RX ADMIN — DULOXETINE HYDROCHLORIDE 30 MILLIGRAM(S): 30 CAPSULE, DELAYED RELEASE ORAL at 18:53

## 2018-09-25 NOTE — PROGRESS NOTE ADULT - PROBLEM SELECTOR PLAN 1
P/w anemia w/ differentials including iron deficient anemia as per prior anemia panel but also include GI ulcers  - FOBT +ve but rectal exam showed hard brown stool  - Prior EGD clean based ulcer; unlikely source of bleeding  - c/w full liquid diet and po protonix  GI Dr Garzon  -h/h stable post transfusion  -seems to be possibly transient bleeding? diverticular bleed?  -Dr. Garzon- GI signed off  H/H now stable  Palliative consult consulted  -patient to go for home hospice, dc planning, grandson waiting for hospital bed and to clean the house prior to arrival  c/w IV venofer

## 2018-09-25 NOTE — PROGRESS NOTE ADULT - SUBJECTIVE AND OBJECTIVE BOX
chris nd examined  vs stable physical unchanged    on iv venofer  no blood test as molst say no blood transfusion   for home hospice

## 2018-09-25 NOTE — CHART NOTE - NSCHARTNOTEFT_GEN_A_CORE
Reassessment:   89yFemalePatient is a 89y old  Female who presents with a chief complaint of anemia (25 Sep 2018 12:25)      Factors impacting intake: [ ] none [ ] nausea  [ ] vomiting [ ] diarrhea [ ] constipation  [ ]chewing problems [ ] swallowing issues  [X ] other:     Diet Presciption: Diet, Full Liquid (18 @ 18:52)    Intake: Pt. family/HHA requesting diet education & information on pureed meal planning/tips to take home once pt. d/c. Give copy & education on Dysphagia nutrition therapy to take home & answer all question & concerns, d/w RN/, pending transfer to home with home hospice care possible tomorrow. Presently pt. on full liquid diet & tolerating, rec. comfort/supportive care as medically feasible. RD available      Daily Weight in k (25 Sep 2018 05:06)  Weight in k.4 (19 Sep 2018 05:10)    % Weight Change: N/A    Pertinent Medications: MEDICATIONS  (STANDING):  cyanocobalamin 1000 MICROGram(s) Oral daily  dextrose 5% + sodium chloride 0.9%. 1000 milliLiter(s) (75 mL/Hr) IV Continuous <Continuous>  donepezil 10 milliGRAM(s) Oral at bedtime  DULoxetine 30 milliGRAM(s) Oral two times a day  ferrous    sulfate 325 milliGRAM(s) Oral two times a day  folic acid 1 milliGRAM(s) Oral daily  insulin lispro (HumaLOG) corrective regimen sliding scale   SubCutaneous Before meals and at bedtime  iron sucrose IVPB 200 milliGRAM(s) IV Intermittent every 24 hours  melatonin 3 milliGRAM(s) Oral at bedtime  pantoprazole    Tablet 40 milliGRAM(s) Oral before breakfast    MEDICATIONS  (PRN):    Pertinent Labs:      CAPILLARY BLOOD GLUCOSE      POCT Blood Glucose.: 225 mg/dL (25 Sep 2018 16:16)  POCT Blood Glucose.: 283 mg/dL (25 Sep 2018 13:42)  POCT Blood Glucose.: 249 mg/dL (25 Sep 2018 12:12)  POCT Blood Glucose.: 155 mg/dL (25 Sep 2018 07:45)  POCT Blood Glucose.: 179 mg/dL (24 Sep 2018 21:02)    Skin:     Estimated Needs:   [ ] no change since previous assessment  [ ] recalculated:     Previous Nutrition Diagnosis:   [ ] Inadequate Energy Intake [ ]Inadequate Oral Intake [ ] Excessive Energy Intake   [ ] Underweight [ ] Increased Nutrient Needs [ ] Overweight/Obesity   [ ] Altered GI Function [ ] Unintended Weight Loss [ ] Food & Nutrition Related Knowledge Deficit [ ] Malnutrition     Nutrition Diagnosis is [ ] ongoing  [ ] resolved [ ] not applicable     New Nutrition Diagnosis: [ ] not applicable       Interventions: Comfort/supportive care, as medically feasible   Recommend  [ ] Change Diet To:  [ ] Nutrition Supplement  [ ] Nutrition Support  [ ] Other:     Monitoring and Evaluation:   [ ] PO intake [ x ] Tolerance to diet prescription [ x ] weights [ x ] labs[ x ] follow up per protocol  [ ] other:

## 2018-09-25 NOTE — PROGRESS NOTE ADULT - SUBJECTIVE AND OBJECTIVE BOX
Patient is a 89y old  Female who presents with a chief complaint of anemia (24 Sep 2018 12:40)    Patient was seen and examined, no acute distress. No complaints. Resting comfortably in bed. Denies pain or discomfort. DC planning, grandson waiting for hospital bed and to clean the house prior to patient's arrival, likely today or tomorrow DC. No blood transfusions.    INTERVAL HPI/OVERNIGHT EVENTS:  T(C): 37.3 (09-25-18 @ 05:06), Max: 37.3 (09-25-18 @ 05:06)  HR: 88 (09-25-18 @ 05:06) (88 - 103)  BP: 134/70 (09-25-18 @ 05:06) (120/87 - 134/73)  RR: 18 (09-25-18 @ 05:06) (17 - 18)  SpO2: 100% (09-25-18 @ 05:06) (97% - 100%)    MEDICATIONS  (STANDING):  cyanocobalamin 1000 MICROGram(s) Oral daily  dextrose 5% + sodium chloride 0.9%. 1000 milliLiter(s) (75 mL/Hr) IV Continuous <Continuous>  donepezil 10 milliGRAM(s) Oral at bedtime  DULoxetine 30 milliGRAM(s) Oral two times a day  ferrous    sulfate 325 milliGRAM(s) Oral two times a day  folic acid 1 milliGRAM(s) Oral daily  insulin lispro (HumaLOG) corrective regimen sliding scale   SubCutaneous Before meals and at bedtime  iron sucrose IVPB 200 milliGRAM(s) IV Intermittent every 24 hours  melatonin 3 milliGRAM(s) Oral at bedtime  pantoprazole    Tablet 40 milliGRAM(s) Oral before breakfast    PHYSICAL EXAM:  GENERAL: NAD, well-groomed, well-developed  HEAD:  Atraumatic, Normocephalic  EYES: EOMI, PERRLA, conjunctiva and sclera clear  ENMT: No tonsillar erythema, exudates, or enlargement; Moist mucous membranes  NECK: Supple, No JVD, Normal thyroid  NERVOUS SYSTEM: alert and awake, following commands;   CHEST/LUNG: Clear to percussion bilaterally; No rales, rhonchi, wheezing, or rubs  HEART: Regular rate and rhythm; No murmurs, rubs, or gallops  ABDOMEN: Soft, Nontender, Nondistended; Bowel sounds present  EXTREMITIES:  2+ Peripheral Pulses, No clubbing, cyanosis, or edema  LYMPH: No lymphadenopathy noted  SKIN: No rashes or lesions    POCT Blood Glucose.: 155 mg/dL (25 Sep 2018 07:45)  POCT Blood Glucose.: 179 mg/dL (24 Sep 2018 21:02)  POCT Blood Glucose.: 212 mg/dL (24 Sep 2018 16:36)  POCT Blood Glucose.: 176 mg/dL (24 Sep 2018 11:28)

## 2018-09-26 LAB
GLUCOSE BLDC GLUCOMTR-MCNC: 118 MG/DL — HIGH (ref 70–99)
GLUCOSE BLDC GLUCOMTR-MCNC: 140 MG/DL — HIGH (ref 70–99)
GLUCOSE BLDC GLUCOMTR-MCNC: 162 MG/DL — HIGH (ref 70–99)
GLUCOSE BLDC GLUCOMTR-MCNC: 247 MG/DL — HIGH (ref 70–99)

## 2018-09-26 RX ORDER — PREGABALIN 225 MG/1
1 CAPSULE ORAL
Qty: 0 | Refills: 0 | COMMUNITY

## 2018-09-26 RX ORDER — ALPRAZOLAM 0.25 MG
1 TABLET ORAL
Qty: 0 | Refills: 0 | COMMUNITY

## 2018-09-26 RX ORDER — HALOPERIDOL DECANOATE 100 MG/ML
2 INJECTION INTRAMUSCULAR ONCE
Qty: 0 | Refills: 0 | Status: COMPLETED | OUTPATIENT
Start: 2018-09-26 | End: 2018-09-26

## 2018-09-26 RX ORDER — ALPRAZOLAM 0.25 MG
1 TABLET ORAL
Qty: 7 | Refills: 0 | OUTPATIENT
Start: 2018-09-26 | End: 2018-10-02

## 2018-09-26 RX ORDER — METFORMIN HYDROCHLORIDE 850 MG/1
1 TABLET ORAL
Qty: 0 | Refills: 0 | COMMUNITY

## 2018-09-26 RX ORDER — OMEPRAZOLE 10 MG/1
1 CAPSULE, DELAYED RELEASE ORAL
Qty: 30 | Refills: 0 | OUTPATIENT
Start: 2018-09-26 | End: 2018-10-25

## 2018-09-26 RX ORDER — LANOLIN ALCOHOL/MO/W.PET/CERES
1 CREAM (GRAM) TOPICAL
Qty: 0 | Refills: 0 | COMMUNITY

## 2018-09-26 RX ORDER — FOLIC ACID 0.8 MG
1 TABLET ORAL
Qty: 0 | Refills: 0 | COMMUNITY

## 2018-09-26 RX ORDER — OMEPRAZOLE 10 MG/1
1 CAPSULE, DELAYED RELEASE ORAL
Qty: 0 | Refills: 0 | COMMUNITY

## 2018-09-26 RX ORDER — LANOLIN ALCOHOL/MO/W.PET/CERES
1 CREAM (GRAM) TOPICAL
Qty: 30 | Refills: 0 | OUTPATIENT
Start: 2018-09-26 | End: 2018-10-25

## 2018-09-26 RX ORDER — DULOXETINE HYDROCHLORIDE 30 MG/1
1 CAPSULE, DELAYED RELEASE ORAL
Qty: 0 | Refills: 0 | COMMUNITY

## 2018-09-26 RX ORDER — DONEPEZIL HYDROCHLORIDE 10 MG/1
1 TABLET, FILM COATED ORAL
Qty: 0 | Refills: 0 | COMMUNITY

## 2018-09-26 RX ORDER — FERROUS SULFATE 325(65) MG
1 TABLET ORAL
Qty: 0 | Refills: 0 | COMMUNITY

## 2018-09-26 RX ORDER — DULOXETINE HYDROCHLORIDE 30 MG/1
1 CAPSULE, DELAYED RELEASE ORAL
Qty: 60 | Refills: 0 | OUTPATIENT
Start: 2018-09-26 | End: 2018-10-25

## 2018-09-26 RX ADMIN — Medication 1 MILLIGRAM(S): at 12:19

## 2018-09-26 RX ADMIN — PREGABALIN 1000 MICROGRAM(S): 225 CAPSULE ORAL at 12:20

## 2018-09-26 RX ADMIN — PANTOPRAZOLE SODIUM 40 MILLIGRAM(S): 20 TABLET, DELAYED RELEASE ORAL at 06:49

## 2018-09-26 RX ADMIN — Medication 2: at 17:16

## 2018-09-26 RX ADMIN — HALOPERIDOL DECANOATE 2 MILLIGRAM(S): 100 INJECTION INTRAMUSCULAR at 06:49

## 2018-09-26 RX ADMIN — DULOXETINE HYDROCHLORIDE 30 MILLIGRAM(S): 30 CAPSULE, DELAYED RELEASE ORAL at 17:17

## 2018-09-26 RX ADMIN — Medication 325 MILLIGRAM(S): at 06:49

## 2018-09-26 RX ADMIN — DONEPEZIL HYDROCHLORIDE 10 MILLIGRAM(S): 10 TABLET, FILM COATED ORAL at 21:25

## 2018-09-26 RX ADMIN — Medication 1: at 08:01

## 2018-09-26 RX ADMIN — IRON SUCROSE 110 MILLIGRAM(S): 20 INJECTION, SOLUTION INTRAVENOUS at 12:19

## 2018-09-26 RX ADMIN — Medication 3 MILLIGRAM(S): at 21:25

## 2018-09-26 RX ADMIN — DULOXETINE HYDROCHLORIDE 30 MILLIGRAM(S): 30 CAPSULE, DELAYED RELEASE ORAL at 06:49

## 2018-09-26 RX ADMIN — Medication 325 MILLIGRAM(S): at 17:17

## 2018-09-26 NOTE — PROGRESS NOTE ADULT - SUBJECTIVE AND OBJECTIVE BOX
seen and examined  vs stable afebrile  physical unchanged   awake  trying to answer questions.  being d/cd to home hospice.  is DNR DNI, no hosp no BT

## 2018-09-26 NOTE — PROGRESS NOTE ADULT - PROBLEM SELECTOR PROBLEM 1
GI bleed
Late onset Alzheimer's disease with behavioral disturbance

## 2018-09-26 NOTE — PROGRESS NOTE ADULT - SUBJECTIVE AND OBJECTIVE BOX
Patient is a 89y old  Female who presents with a chief complaint of anemia (25 Sep 2018 12:25)    Patient was seen and examined, no acute complaints. Awaiting home hospice. Denies pain or discomfort.      INTERVAL HPI/OVERNIGHT EVENTS:  T(C): 36.6 (09-26-18 @ 04:54), Max: 37.1 (09-25-18 @ 20:29)  HR: 109 (09-26-18 @ 04:54) (109 - 120)  BP: 121/78 (09-26-18 @ 04:54) (121/78 - 144/74)  RR: 18 (09-26-18 @ 04:54) (17 - 18)  SpO2: 96% (09-26-18 @ 04:54) (96% - 100%)    MEDICATIONS  (STANDING):  cyanocobalamin 1000 MICROGram(s) Oral daily  dextrose 5% + sodium chloride 0.9%. 1000 milliLiter(s) (75 mL/Hr) IV Continuous <Continuous>  donepezil 10 milliGRAM(s) Oral at bedtime  DULoxetine 30 milliGRAM(s) Oral two times a day  ferrous    sulfate 325 milliGRAM(s) Oral two times a day  folic acid 1 milliGRAM(s) Oral daily  insulin lispro (HumaLOG) corrective regimen sliding scale   SubCutaneous Before meals and at bedtime  iron sucrose IVPB 200 milliGRAM(s) IV Intermittent every 24 hours  melatonin 3 milliGRAM(s) Oral at bedtime  pantoprazole    Tablet 40 milliGRAM(s) Oral before breakfast    PHYSICAL EXAM:  GENERAL: NAD, well-groomed, well-developed  HEAD:  Atraumatic, Normocephalic  EYES: EOMI, PERRLA, conjunctiva and sclera clear  ENMT: No tonsillar erythema, exudates, or enlargement; Moist mucous membranes  NECK: Supple, No JVD, Normal thyroid  NERVOUS SYSTEM: alert and awake, following commands;   CHEST/LUNG: Clear to percussion bilaterally; No rales, rhonchi, wheezing, or rubs  HEART: Regular rate and rhythm; No murmurs, rubs, or gallops  ABDOMEN: Soft, Nontender, Nondistended; Bowel sounds present  EXTREMITIES:  2+ Peripheral Pulses, No clubbing, cyanosis, or edema  LYMPH: No lymphadenopathy noted  SKIN: No rashes or lesions    POCT Blood Glucose.: 162 mg/dL (26 Sep 2018 07:35)  POCT Blood Glucose.: 151 mg/dL (25 Sep 2018 22:54)  POCT Blood Glucose.: 166 mg/dL (25 Sep 2018 21:28)  POCT Blood Glucose.: 225 mg/dL (25 Sep 2018 16:16)  POCT Blood Glucose.: 283 mg/dL (25 Sep 2018 13:42)  POCT Blood Glucose.: 249 mg/dL (25 Sep 2018 12:12)

## 2018-09-26 NOTE — PROGRESS NOTE ADULT - PROBLEM SELECTOR PROBLEM 3
Anemia, unspecified type
Palliative care encounter

## 2018-09-26 NOTE — PROGRESS NOTE ADULT - PROBLEM SELECTOR PROBLEM 4
Functional quadriplegia
Type 2 diabetes mellitus without complication, without long-term current use of insulin

## 2018-09-26 NOTE — PROGRESS NOTE ADULT - PROBLEM SELECTOR PROBLEM 2
Severe protein-calorie malnutrition
Dementia

## 2018-09-26 NOTE — PROGRESS NOTE ADULT - PROBLEM SELECTOR PROBLEM 5
Palliative care encounter
Prophylactic measure

## 2018-09-26 NOTE — PROGRESS NOTE ADULT - PROBLEM SELECTOR PLAN 5
IMPROVE VTE Individual Risk Assessment    RISK                                                          Points  [] Previous VTE                                           3  [] Thrombophilia                                        2  [] Lower limb paralysis                              2   [] Current Cancer                                       2   [x] Immobilization > 24 hrs                        1  [] ICU/CCU stay > 24 hours                       1  [x] Age > 60                                                   1    IMPROVE VTE Score: 2, DVT PPx w/ SCD 2/2 anemia
Met with patient's grandson/HCP, Yousuf, face to face. Dr. Segovia present. Discussed status. HCP acknowledges continued decline. Discussed risks vs benefits of resuscitation, intubation, artificial nutrition, transfusions. MOLST completed as per son's wishes: DNR / DNI / DNH / no feeding tube / trial IV fluids / determine use or limitation of abx / No more blood transfusions / goal of care is comfort. For home hospice. All questions answered; supportive counseling provided.

## 2018-09-27 VITALS
TEMPERATURE: 99 F | OXYGEN SATURATION: 100 % | SYSTOLIC BLOOD PRESSURE: 113 MMHG | DIASTOLIC BLOOD PRESSURE: 70 MMHG | HEART RATE: 117 BPM | RESPIRATION RATE: 18 BRPM

## 2018-09-27 LAB
GLUCOSE BLDC GLUCOMTR-MCNC: 140 MG/DL — HIGH (ref 70–99)
GLUCOSE BLDC GLUCOMTR-MCNC: 164 MG/DL — HIGH (ref 70–99)

## 2018-09-27 RX ADMIN — Medication 1: at 12:45

## 2018-09-27 RX ADMIN — PANTOPRAZOLE SODIUM 40 MILLIGRAM(S): 20 TABLET, DELAYED RELEASE ORAL at 06:55

## 2018-09-27 RX ADMIN — Medication 1 MILLIGRAM(S): at 12:46

## 2018-09-27 RX ADMIN — Medication 325 MILLIGRAM(S): at 06:55

## 2018-09-27 RX ADMIN — DULOXETINE HYDROCHLORIDE 30 MILLIGRAM(S): 30 CAPSULE, DELAYED RELEASE ORAL at 06:55

## 2018-09-27 RX ADMIN — PREGABALIN 1000 MICROGRAM(S): 225 CAPSULE ORAL at 12:47

## 2018-09-27 NOTE — PROGRESS NOTE ADULT - PROVIDER SPECIALTY LIST ADULT
Gastroenterology
Internal Medicine
Palliative Care
Internal Medicine

## 2018-09-27 NOTE — PROGRESS NOTE ADULT - ASSESSMENT
more awake, not in any distress.  confused as before  no episode of bleeding       vs stable afebrile  abd soft bs nml  nontender  hgb stable  pt is 89 yr old female with advanced dementia, pancreatic mass, bed bound for a awhile. as per GI no intervention for anemia  palleative care on case.  watch hgb
89 year old female, from [facility], AAOx1, with PMHx Dementia, HTN, DM, HLD, Iron deficiency anemia, and Urinary Incontinence sent from NH for reported Hb 6.5 - admitting for further evaluation. Now pending home hospice.
89 year old female with recurrent anemia. EGD last month showed a clean base ulcer with partial obstruction of the pylorus No active bleeding. Colonoscopy showed a cecal ulcer that was clipped - no active bleeding. CT scan shows no evidence of GI mass. IF the patient was younger and healthier then a capsule would be considered However in her current state as well as the pyloric stricture (it would be contraindicated there is no more endoscopy can offered endoscopically. Suggest supportive care . Monitor CBC and transfuse as needed. GOC with family.
89 year old female, from [facility], AAOx1, with PMHx Dementia, HTN, DM, HLD, Iron deficiency anemia, and Urinary Incontinence sent from NH for reported Hb 6.5 - admitting for furher evaluation
89 year old female, from [facility], AAOx1, with PMHx Dementia, HTN, DM, HLD, Iron deficiency anemia, and Urinary Incontinence sent from NH for reported Hb 6.5 - admitting for furher evaluation
89 year old female, from [facility], AAOx1, with PMHx Dementia, HTN, DM, HLD, Iron deficiency anemia, and Urinary Incontinence sent from NH for reported Hb 6.5 - admitting for further evaluation. Now pending home hospice.
89 year old female, from [facility], AAOx1, with PMHx Dementia, HTN, DM, HLD, Iron deficiency anemia, and Urinary Incontinence sent from NH for reported Hb 6.5 - admitting for furher evaluation
89 year old female, from [facility], AAOx1, with PMHx Dementia, HTN, DM, HLD, Iron deficiency anemia, and Urinary Incontinence sent from NH for reported Hb 6.5 - admitting for furher evaluation
89 year old female, from [facility], AAOx1, with PMHx Dementia, HTN, DM, HLD, Iron deficiency anemia, and Urinary Incontinence sent from NH for reported Hb 6.5 - admitting for furher evaluation
89 year old female, from [facility], AAOx1, with PMHx Dementia, HTN, DM, HLD, Iron deficiency anemia, and Urinary Incontinence sent from NH for reported Hb 6.5 - admitting for further evaluation. Now pending home hospice.
chris nd examined  vs stable afebrile  physical unchanged    pt is being transferred to home hospice  d/w .
chris nd examined vs stable afebrile awake but not talking . not in any distress. physical unchanged   abd non tender.  apetite low but being fed by pvt aid.  no labs   as palleative care dnr, dni, no hosp, no blood transf,  HOME HOSPICE  awaiting for HOSP BED
seen and examined  89 year old female  htn, hld, dementia, h/o pancraetic mass, gi bleed recently discharged with Dx of upper gi ulcers ( EGD)   cecal ulcers ( colonoscopy)   pt had received blood transfusion  in NH within a week pts hgb from 9 to came down to 6.5  transferred to er where hg was 7.3   BT given  GI also called  hgb is improved    as per GI no furthur intervention.  will get palleative care consult  poor prognosis
seen and examined  not in any distress. physical unchanged  vs stable awake not in any distress.  anemia  on venofer    dnr dni for  home hospice
seen and examined vs stable afebrile awake but confused  moving extremites , focusing eyes but not talking.   physical unchanged   hgb 8.4  stable  pt's son will have meeting with palleative care.   as per gi no intervention.  pt is on FA and B12    his fe and ferritin were low  will start on feso4    as per GI no furthur intervention.  as pt is 89 yr old advanced dementia, bed bound  i doubt, surgical consult is a good idea.  will defer

## 2018-09-27 NOTE — PROGRESS NOTE ADULT - REASON FOR ADMISSION
anemia

## 2018-09-27 NOTE — PROGRESS NOTE ADULT - SUBJECTIVE AND OBJECTIVE BOX
HPI:  89 year old female, from [facility], AAOx1, with PMHx Dementia, HTN, DM, HLD, Iron deficiency anemia, and Urinary Incontinence sent from NH for reported Hb 6.5 - Patient is very demented and unable to give further details. Grandson at bedside assists w/ HPI - patient reportedly had Hg 9 the week prior - Grandson endorses poor PO intake, otherwise denies abdominal pain, NVDC, change in habits, anticoagulants, urinary symptoms, or any other complaints. In the ED patient seen resting and occasionally screaming - Patient was recently admitted syncopal episode and found to be anemic (Hb: 7.1), FOBT (+), underwent EGD which was negative for active bleeding, found to have bezoar in stomach. 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 GOC discussed at length with steven To - confirmed DNR/DNI. (17 Sep 2018 18:36)      Patient is a 89y old  Female who presents with a chief complaint of anemia (26 Sep 2018 14:23)      INTERVAL HPI/OVERNIGHT EVENTS:  T(C): 36.4 (09-27-18 @ 04:53), Max: 36.4 (09-27-18 @ 04:53)  HR: 102 (09-27-18 @ 04:53) (102 - 103)  BP: 124/52 (09-27-18 @ 04:53) (115/62 - 126/51)  RR: 18 (09-27-18 @ 04:53) (17 - 18)  SpO2: 100% (09-27-18 @ 04:53) (96% - 100%)  Wt(kg): --  I&O's Summary      REVIEW OF SYSTEMS: denies fever, chills, SOB, palpitations, chest pain, abdominal pain, nausea, vomitting, diarrhea, constipation, dizziness    MEDICATIONS  (STANDING):  cyanocobalamin 1000 MICROGram(s) Oral daily  dextrose 5% + sodium chloride 0.9%. 1000 milliLiter(s) (75 mL/Hr) IV Continuous <Continuous>  donepezil 10 milliGRAM(s) Oral at bedtime  DULoxetine 30 milliGRAM(s) Oral two times a day  ferrous    sulfate 325 milliGRAM(s) Oral two times a day  folic acid 1 milliGRAM(s) Oral daily  insulin lispro (HumaLOG) corrective regimen sliding scale   SubCutaneous Before meals and at bedtime  melatonin 3 milliGRAM(s) Oral at bedtime  pantoprazole    Tablet 40 milliGRAM(s) Oral before breakfast    MEDICATIONS  (PRN):      PHYSICAL EXAM:  GENERAL: NAD, well-groomed, well-developed  HEAD:  Atraumatic, Normocephalic  EYES: EOMI, PERRLA, conjunctiva and sclera clear  ENMT: No tonsillar erythema, exudates, or enlargement; Moist mucous membranes, Good dentition, No lesions  NECK: Supple, No JVD, Normal thyroid  NERVOUS SYSTEM:  Alert & Oriented X3, Good concentration; Motor Strength 5/5 B/L upper and lower extremities; DTRs 2+ intact and symmetric  CHEST/LUNG: Clear to percussion bilaterally; No rales, rhonchi, wheezing, or rubs  HEART: Regular rate and rhythm; No murmurs, rubs, or gallops  ABDOMEN: Soft, Nontender, Nondistended; Bowel sounds present  EXTREMITIES:  2+ Peripheral Pulses, No clubbing, cyanosis, or edema  LYMPH: No lymphadenopathy noted  SKIN: No rashes or lesions  LABS:              CAPILLARY BLOOD GLUCOSE      POCT Blood Glucose.: 164 mg/dL (27 Sep 2018 11:43)  POCT Blood Glucose.: 140 mg/dL (27 Sep 2018 08:09)  POCT Blood Glucose.: 118 mg/dL (26 Sep 2018 21:08)  POCT Blood Glucose.: 247 mg/dL (26 Sep 2018 16:17)

## 2018-10-09 ENCOUNTER — APPOINTMENT (OUTPATIENT)
Dept: GASTROENTEROLOGY | Facility: CLINIC | Age: 83
End: 2018-10-09

## 2018-10-25 PROCEDURE — 86923 COMPATIBILITY TEST ELECTRIC: CPT

## 2018-10-25 PROCEDURE — 86850 RBC ANTIBODY SCREEN: CPT

## 2018-10-25 PROCEDURE — 84443 ASSAY THYROID STIM HORMONE: CPT

## 2018-10-25 PROCEDURE — 93005 ELECTROCARDIOGRAM TRACING: CPT

## 2018-10-25 PROCEDURE — 83605 ASSAY OF LACTIC ACID: CPT

## 2018-10-25 PROCEDURE — 83880 ASSAY OF NATRIURETIC PEPTIDE: CPT

## 2018-10-25 PROCEDURE — 87040 BLOOD CULTURE FOR BACTERIA: CPT

## 2018-10-25 PROCEDURE — 83735 ASSAY OF MAGNESIUM: CPT

## 2018-10-25 PROCEDURE — 80048 BASIC METABOLIC PNL TOTAL CA: CPT

## 2018-10-25 PROCEDURE — 81001 URINALYSIS AUTO W/SCOPE: CPT

## 2018-10-25 PROCEDURE — 82803 BLOOD GASES ANY COMBINATION: CPT

## 2018-10-25 PROCEDURE — 86900 BLOOD TYPING SEROLOGIC ABO: CPT

## 2018-10-25 PROCEDURE — 86780 TREPONEMA PALLIDUM: CPT

## 2018-10-25 PROCEDURE — 82607 VITAMIN B-12: CPT

## 2018-10-25 PROCEDURE — 85027 COMPLETE CBC AUTOMATED: CPT

## 2018-10-25 PROCEDURE — 86901 BLOOD TYPING SEROLOGIC RH(D): CPT

## 2018-10-25 PROCEDURE — 88305 TISSUE EXAM BY PATHOLOGIST: CPT

## 2018-10-25 PROCEDURE — 70450 CT HEAD/BRAIN W/O DYE: CPT

## 2018-10-25 PROCEDURE — 85610 PROTHROMBIN TIME: CPT

## 2018-10-25 PROCEDURE — 87086 URINE CULTURE/COLONY COUNT: CPT

## 2018-10-25 PROCEDURE — 80053 COMPREHEN METABOLIC PANEL: CPT

## 2018-10-25 PROCEDURE — 36430 TRANSFUSION BLD/BLD COMPNT: CPT

## 2018-10-25 PROCEDURE — 82272 OCCULT BLD FECES 1-3 TESTS: CPT

## 2018-10-25 PROCEDURE — 82746 ASSAY OF FOLIC ACID SERUM: CPT

## 2018-10-25 PROCEDURE — P9040: CPT

## 2018-10-25 PROCEDURE — 80076 HEPATIC FUNCTION PANEL: CPT

## 2018-10-25 PROCEDURE — 71045 X-RAY EXAM CHEST 1 VIEW: CPT

## 2018-10-25 PROCEDURE — 82962 GLUCOSE BLOOD TEST: CPT

## 2018-10-25 PROCEDURE — 99291 CRITICAL CARE FIRST HOUR: CPT | Mod: 25

## 2018-10-25 PROCEDURE — 99285 EMERGENCY DEPT VISIT HI MDM: CPT | Mod: 25

## 2018-10-25 PROCEDURE — 83036 HEMOGLOBIN GLYCOSYLATED A1C: CPT

## 2018-10-25 PROCEDURE — C1889: CPT

## 2018-10-25 PROCEDURE — 97162 PT EVAL MOD COMPLEX 30 MIN: CPT

## 2018-10-25 PROCEDURE — 73110 X-RAY EXAM OF WRIST: CPT

## 2018-10-25 PROCEDURE — 84100 ASSAY OF PHOSPHORUS: CPT

## 2018-10-25 PROCEDURE — 96374 THER/PROPH/DIAG INJ IV PUSH: CPT

## 2018-10-25 PROCEDURE — 83010 ASSAY OF HAPTOGLOBIN QUANT: CPT

## 2018-10-25 PROCEDURE — 82009 KETONE BODYS QUAL: CPT

## 2018-10-25 PROCEDURE — 85730 THROMBOPLASTIN TIME PARTIAL: CPT

## 2018-10-25 PROCEDURE — 93971 EXTREMITY STUDY: CPT

## 2018-10-25 PROCEDURE — 85045 AUTOMATED RETICULOCYTE COUNT: CPT

## 2018-10-25 PROCEDURE — 88312 SPECIAL STAINS GROUP 1: CPT

## 2018-12-05 NOTE — ED ADULT NURSE NOTE - CAS TRG GEN SKIN COLOR
Progress Notes by Carissa Isabel MD at 12/07/17 09:11 AM     Author:  Carissa Isabel MD Service:  (none) Author Type:  Physician     Filed:  12/07/17 01:11 PM Encounter Date:  12/7/2017 Status:  Signed     :  Carissa Isabel MD (Physician)              PEDIATRIC ILLNESS VISIT   12/7/2017        Roomed by: Camille Sunshine CMA 9:11 AM      SUBJECTIVE  Accompanied by:[BA1.1T]  Father Zen[BA1.1M]  can be reached at There is no cell phone number on file. and its okay to leave a message.  MARIANO Rodriguez is a 19 month old male who is complaining of[BA1.1T] cough and tugging on ears[BA1.1M].  Present for[BA1.1T] 2 weeks[BA1.1M] and is[BA1.1T] worsening[BA1.1M].  Present treatments include -[BA1.1T] none[BA1.1M].   Previous medical contacts for the problem -[BA1.1T] none[BA1.1M].  Had some coughing about 2 weeks ago, but nothing too exciting per dad.  In the last 3 days he has started coughing more frequently, runny nose, and cranky.  No fevers, but he was 89 here.  Possible ear drainage on the right, they used some ear drops on that side last night.[KC1.1M]    Symptoms:  Fever:[BA1.1T]     Feels warm to the touch[BA1.1M]  General:[BA1.1T] FUSSINESS and NOT SLEEPING   Other:[KC1.1M] per subjective above[KC1.1T]    ROS  All other ROS negative unless indicated otherwise above.    Allergies:  Review of patient's allergies indicates no known allergies.    No current outpatient prescriptions on file.       Family history:[BA1.1T] No other family members have acute illnesses[KC1.1M]    Social history:[BA1.1T] Attends [KC1.1M]      OBJECTIVE:   Physical exam  Pulse 110  Temp 100 °F (37.8 °C) (Temporal)   Resp 25  Wt 24 lb 13 oz (11.3 kg)    GENERAL:[BA1.1T] Normal- alert and no distress noted HEAD & SCALP: No lesions, swelling, tenderness or abnormalities.  EYES: No redness, swelling, drainage or abnormalities.  EARS: Rt. Ear Exam: Canal: yellow drainage present, TM: PE tube in place and  draining fluid, TM red  Lt. Ear Exam: Canal: Normal and PE tube lying in the canal, TM: Normal  NOSE: CLEAR RHINORRHEA and PURULENT RHINORRHEA dried around nares  MOUTH: No abnormalities of tongue or mucosal membranes.  THROAT: No redness or lesions.  NECK: No masses, swelling or tenderness. No abnormal lymph nodes.  CHEST: Lungs are clear to auscultation and no retractions.  CARDIO: No murmur or cardiac rhythm irregularities.      ASSE[KC1.2M]SSMENT/PLAN[BA1.1T]  Otitis:.................................Oral antibiotic, Ibuprofen or Tylenol for pain and temperature and Recheck not necessary if no problems after two weeks  URI:..................................Push fluids, Call if worsens, Suction nose with bulb syringe, Saline nose drops tid to help remove mucous and Use vaporizer while sleeping[KC1.2M]    Medication changes:[BA1.1T] Yes.  Was advised on side effects and potential interactions of the new medication(s).  Was advised on the risks of not taking medication(s) or adhering to the medication schedule.[KC1.2M]    Immunizations given today?[BA1.1T] No.[KC1.2M]  See Orders:  Instructed to call if the problem worsens or does not improve within the next 24 to 48 hours.    Schedule follow-up:[BA1.1T] at next well child visit[KC1.2M] or sooner if needed.[KC1.2T]    Electronically Signed by:    Carissa Isabel MD , 12/7/2017[KC1.3T]        Revision History        User Key Date/Time User Provider Type Action    > KC1.3 12/07/17 01:11 PM Carissa Isabel MD Physician Sign     KC1.2 12/07/17 01:09 PM Carissa Isabel MD Physician      KC1.1 12/07/17 09:20 AM Carissa Isabel MD Physician      BA1.1 12/07/17 09:11 AM Sunshine, Camille, CMA Certified Medical Assistant Sign at close encounter    M - Manual, T - Template             Normal for race

## 2019-01-04 NOTE — ED PROVIDER NOTE - CROS ED CARDIOVAS ALL NEG
Pt calling to report that he has frequent nose bleeds. This is about the time every year that he does have a few nose bleeds with the dry weather and had a few prior to his trip. Nose bleed frequency increased . The past 2 weeks , nose bleed happens every other day but this  past week occurring daily mild bleeding that resolves's within 1-2 minutes . longest lasted , < 5 minutes    Pt uses a few tissues to stop the bleeding . No blood clots . Occurring during showers , upon waking in the morning and with exercise. Only on the left nostril   Pt was on vacation and brought home bed bugs. He has been cleaning house and getting rid of bed. Patient did use some Powder for insects that kills bed bugs Called [employer] powder  to help kill them . He thinks he may have sniffed powder in his nose. Patient does take baby asa 81 mg daily . Denies any advil,motrin or aleve. Home remedies discussed with pt. . No current bleeding . See triage notes . Reason for Disposition  â¢ [1] Nosebleed AND [2] bleeding present < 30 minutes AND [3] using correct technique per guideline (all triage questions negative)    Protocols used: [id number]    Since this has been occurring for 3 plus weeks , would you recommend referral to ENT? Order pended . - - -

## 2019-01-10 NOTE — ED PROVIDER NOTE - HISTORY ATTESTATION, MLM
I have reviewed and confirmed nurses' notes... 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 Patient presented to an outside ED yesterday, 1/9, due to lethargy, occurring in the setting of worsening fatigue for the past few weeks per her family. They noted her to have at least 3 stools a day for the past month, which is not her baseline, and stool then became black over previous couple of days prior to presentation. She also had fallen multiple times recently per her family, including one yesterday prior to presentation.     On presentation, she was notably hypothermic with temp of 86.7, bradycardic in the low 50s, and hypotensive around 90/50. Her initial CBC revealed a normal WBC, but significant anemia with Hgb of 6.2. Her BUN was markedly elevated at 114, and Cr was increased from baseline at 3.66. She also had mild alk phos and ALT elevations of 154 and 113 respectively. Her TSH was elevated at 40, and was noted to be over 200 a couple weeks ago, and noted she has not been totally compliant with her levothyroxine. Blood cx were obtained in the ED which are ngtd. CXR showed vascular congestion, and patchy lower lobe opacities bilaterally. CTOH had no acute findings, and renal US of transplanted kidney also had no acute findings. Given patient's presentation, she was started empirically on IV vancomycin and cefepime.     Patient was aggressively fluid resuscitated, but continued to have little urine output. Decision was then made to transfer patient here for CVVH. Patient was intubated overnight and had central line placed. She did require  the initiation of Levophed as well due to persistent hypotension.     Given her history of diarrhea, numerous stool studies have been ordered including C diff and enteric pathogens. UA was sent which did show >100 WBCs, and urine culture was sent which is pending. CXR this AM again showed some bibasilar opacities. Her transplant doctor recommending stopping Cellcept, and reducing tacrolimus dose to 1 mg BID. Her temps have notably improved, now within normal limits, and she does have a mild leukocytosis now, although has received stress dose steroids. She remains on Levophed as a single pressor, but has been weaned down to 2 mcg/min from 10 mcg/min overnight. She has been maintained on IV vancomycin, cefepime and Flagyl, and we are asked to evaluate for possible septic etiology.      ROS: Patient remains intubated and sedated. Discussed with her  and son. They note that patient has been fatigued and weak over the past few months, and sleeps for most of the day. They note she has some chronic diarrhea, usually a few episodes a day, and then was notably worse a couple days prior to admission, where she had large volume black stools. She also has been suffering many falls of late, including yesterday prior to admission. Son also notes she urinates frequently, seemingly worse of late, but she doesn't complain of other urinary complaints. She did suffer a wound to her left leg a couple years ago, and has had cellulitis in the leg a couple times since, and does have some increased edema to that leg at baseline. No other new skin lesions noted.       Scheduled Medications:    sodium phosphate IVPB 20 mmol 2 times per day   magnesium sulfate 2 g 2 times per day   ceFEPIme (MAXIPIME) extended interval IVPB 2,000 mg 2 times per day   metroNIDAZOLE (FLAGYL) IVPB 500 mg 3 times per day   VANCOMYCIN - PHARMACIST MONITORED  See Admin Instructions   hydroCORTisone 100 mg 3 times per day   petrolatum(white)/mineral oil/NaCL  1 application 6 times per day   propofol     sodium chloride (PF) 2 mL 2 times per day   chlorhexidine gluconate 15 mL 2 times per day   insulin regular (human)  4 times per day   nystatin  TID   vancomycin (VANCOCIN) IVPB 1,000 mg Q24H   tacrolimus 1 mg BIDTX   tacrolimus 1 mg Once        ALLERGIES:  Grass; Mold   (environmental); and Trees    Past Medical History:   Diagnosis Date   • Acute bronchitis    • Anemia in chronic kidney disease(285.21)    • Arthritis    • Asthma    • Basal cell carcinoma of nose 3/11/2014   • Cataracts, bilateral    • Chronic kidney disease, stage IV (severe) (CMS/[facility])    • Diabetes mellitus type II    • HTN (hypertension)    • Hx of falling    • Hyperlipidemia    • Hypothyroidism    • Iron deficiency anemia, unspecified    • Mohs defect of nose 2014   • MRSA (methicillin resistant Staphylococcus aureus)    • Neuropathy    • Obesity    • Other chronic pain    • Papillary thyroid carcinoma (CMS/[facility])    • Renal transplant recipient 2009    hypertensive nephrosclerosis s/p  donor kidney transplantation   • Sick sinus syndrome (CMS/[facility])    • Sleep apnea     has CPAP - doesn't use currently   • Type II or unspecified type diabetes mellitus with renal manifestations, uncontrolled(250.42) (CMS/[facility])    • Urinary incontinence         Past Surgical History:   Procedure Laterality Date   • Colonoscopy  16    hx of adenoma   • Dexa bone density axial skeleton  2011   • Hand surgery      right   • Joint replacement      left knee replacement   • Kidney transplant     • Kidney transplant     • Pacemaker implant  2012    Dr. Graves, Medtronic MR1 [id number], dual chamber   • Pap smear,routine  2009   • Parathyroidectomy     • Service to gastroenterology      egd   • Skin cancer excision  2014    top of head, tip of nose   • Tonsillectomy and adenoidectomy     • Total thyroidectomy  2007   • Wound debridement Left 2017    Left thigh wound debridement         Social History     Socioeconomic History   • Marital status: /Civil Union     Spouse name: Not on file   • Number of children: Not on file   • Years of education: Not on file   • Highest education level: Not on file   Social Needs   • Financial resource strain: Not on file   • Food insecurity - worry: Not on file   • Food insecurity - inability: Not on file   • Transportation needs - medical: Not on file   • Transportation needs - non-medical: Not on file   Occupational History   • Not on file   Tobacco Use   • Smoking status: Never Smoker   • Smokeless tobacco: Never Used   Substance and Sexual Activity   • Alcohol use: No     Alcohol/week: 0.0 oz     Comment: rarely   • Drug use: No   • Sexual activity: Not on file   Other Topics Concern   • Not on file   Social History Narrative   • Not on file        Family History   Problem Relation Age of Onset   • Asthma Father    • Diabetes Father    • High blood pressure Father    • Early death Father 69        diabetis   • Vision Loss Father    • Hypertension Father    • Asthma Sister    • Kidney disease Sister    • Patient is unaware of any medical problems Mother            Visit Vitals  /88 (BP Location: LUE;Arterial, Patient Position: Semi-Norman's)   Pulse 57   Temp 96.8 °F (36 °C)   Resp 26   Ht 5' 4\" (1.626 m)   Wt 106.4 kg   SpO2 99%   BMI 40.26 kg/m²       Temp:  [86.2 °F (30.1 °C)-96.8 °F (36 °C)] 96.8 °F (36 °C)  Pulse:  [49-78] 57  Resp:  [12-50] 26  BP: ()/(48-88) 157/88  Arterial Line BP: ()/(30-71) 112/48  FiO2 (%):  [40 %-100 %] 40 %      Intake/Output Summary (Last 24 hours) at 1/10/2019 1151  Last data filed at 1/10/2019 1100  Gross per 24 hour   Intake 981.03 ml   Output 22 ml   Net 959.03 ml       Gen: 73 year old chronically ill appearing female in ICU. Intubated and sedated  Psych: WILLARD  HEENT: Notable edema to the face bilaterally. OP exam limited by ET tube  Neck: Supple, no LAD.  CV: Bradycardic, regular rhythm, no  murmur  Resp: Lung sounds diminished at bilateral bases with fairly coarse vent sounds throughout. Remains intubated at 40% FiO2  Nicole: soft, obese. Non-distended. Non-tender. No masses appreciated. No organomegaly. Henao in place with small amount of yellow urine output    Ext: 2+ edema to BLE. Faint, pinkish-red erythematous changes to bilateral legs, no calor associated. Multiple scrapes and excoriations noted to bilateral legs. No lesion with significant masood-wound erythema or calor consistent with cellulitis. No drainage from any lesions.   MSK: Muscle tone and strength equal.  Skin: no rashes noted.  Neuro: A/O x 3. No gross neuro deficits.   Lines: R IJ and R arm PIV no erythema      Recent Labs   Lab 01/10/19  0515 01/09/19  2210 01/09/19  1823 01/09/19  1248   WBC 11.6*  --   --  7.6   RBC 2.58*  --   --  2.01*   HGB 7.9* 7.5* 6.5* 6.2*   HCT 24.3* 25.0* 21.3* 20.5*   MCV 94.2  --   --  102.0*   MCH 30.6  --   --  30.8   MCHC 32.5  --   --  30.2*     --   --  206       Recent Labs   Lab 01/10/19  0515 01/09/19  2210 01/09/19  18119  1248   CALCIUM 7.7* 7.6* 7.7* 7.8*   * 107* 115* 114*   ALBUMIN  --   --   --  3.0*   AST  --   --   --  30   CO2 18* 19* 18* 17*   CREATININE 3.59* 3.47* 3.47* 3.66*   ANIONGAP 24* 22* 19 23*       PCT 0.22    Micro:    BCX: ngtd  1/10 Urine cx: P (UA >100 WBCs)    Imagin/9 CXR: IMPRESSION:  1. Cardiomegaly and findings of vascular congestion, as above.  2. Minimal patchy airspace disease is present within the lower lobes, and  clinical differentiation of atelectasis from an early infiltrate of the  sequelae of vascular congestion is necessary.  3. Small bilateral effusions, left greater than right     Renal US: IMPRESSION:     Stable right pelvic transplant kidney without acute finding.     Minimal ascites.    1/10 CXR: IMPRESSION:     1. Stable medical support apparatus.  2. Moderate pulmonary vascular congestion pattern with small  bilateral  pleural effusions.      Assessment  1. 73 year old female with history of renal transplant in 2009 on Cellcept and tacrolimus, admitted with multi-factorial shock  - admitted with severe hypothermia, bradycardia, hypotension, and acute worsening of chronic weakness and confusion  - given acute worsening of weakness and mental status, concern for infectious trigger. Main concern urinary given her incontinence and pyuria on UA. Skin source considered given appearance of BLE. ? Aspiration in setting of weakness and AMS, does have some patchy bibasilar changes. ? Occult bacteremia.   - #3 likely contributing as well. TSH over 200 a few weeks ago, and remains elevated over 40 here. ? Some component of myxedema coma   - ? Hypovolemic component given Hgb of 6.2. ? GI bleed    2. S/p Renal transplant in 2009 d/t hypertensive nephrosclerosis: on tacrolimus, Cellcept and prednisone daily.  - Cellcept currently on hold, tacrolimus dose reduced    3. Severe hypothyroidism: ? Myxedema coma given physical exam findings and hypothermia and other lab findings. Na has been wnl however  - TSH over 200 in December, currently at 40. Has not been compliant with levothyroxine per family    4. Acute hypoxic respiratory failure:    5. Anemia: likely chronic component in setting of #2, but concern for GI bleed causing acute worsening given black stools    6. Chronic diarrhea: over past couple years per family. Usually 3 stools a day, with recent acute worsening with black stools    7. T2DM    8. Sick sinus syndrome s/p PPM placement      Plan  - no issues with IV vancomycin, cefepime and Flagyl for now  - will need to monitor vanco troughs closely. On CVVH  - levothyroxine per primary team  - follow blood and urine cultures   - monitor respiratory status and vent setting  - follow all chest imaging  - monitor T curve and WBC count    Patient staffed with Dr. Ferreira, who agrees with the above assessment and plan    Thank you for the  opportunity to participate in this patient's care. We will follow with you.    Matt Leigh PA-C    The patient was seen and examined.  The above reflect my findings and recommendations.    Danish Ferreira MD    1/10/2019

## 2019-07-31 NOTE — H&P ADULT - ASSESSMENT
98.4
89yo female, from NH, AAOx1, with PMHx Dementia, HTN, DM, HLD, Iron deficiency anemia, Urinary Incontinence, sent from NH for Hb: 5. Patient is very demented and unable to give further details. Resting comfortably in bed, no abdominal pain, N/V/ hematochezia or hematemesis.    ED course:   BP: 93/65 mmHg, HR: 119 bpm, RR: 16 rpm, T: 98.1F, SpO2: 100% RA  Supine BP: 90/45 mmHg, HR: 125 bpm  Sitting BP: 105/52 mmHg, HR: 135 bpm  CBC significant for Hb: 5.1, Hct: 16.6  BMP significant for Cr: 1.8, BUN 70,  eGFR: 48  FOBT positive    Patient being admitted for severe anemia 2/2 GIB.  FULL CODE as per proxy wishes.

## 2020-05-20 NOTE — PROGRESS NOTE ADULT - SUBJECTIVE AND OBJECTIVE BOX
Neck pain mild  Mild dysphagia  Incision c.d.i  BUE 5/5  POD 1 ACDF revision/extension  Dc home today  F/u / Maria L as scheduled     Patient is a 89y old  Female who presents with a chief complaint of anemia (19 Sep 2018 13:23)      Overnight Events: None    REVIEW OF SYSTEMS:    CONSTITUTIONAL: No weakness, fevers or chills  RESPIRATORY: No cough, wheezing, hemoptysis; No shortness of breath  CARDIOVASCULAR: No chest pain or palpitations  GASTROINTESTINAL: No abdominal or epigastric pain. No nausea, vomiting, or hematemesis; No diarrhea or constipation. No melena or hematochezia.  NEUROLOGICAL: No numbness or weakness  All other review of systems is negative unless indicated above.    MEDICATIONS  (STANDING):  ALPRAZolam 0.25 milliGRAM(s) Oral at bedtime  cyanocobalamin 1000 MICROGram(s) Oral daily  dextrose 5% + sodium chloride 0.9%. 1000 milliLiter(s) (75 mL/Hr) IV Continuous <Continuous>  donepezil 10 milliGRAM(s) Oral at bedtime  DULoxetine 30 milliGRAM(s) Oral two times a day  folic acid 1 milliGRAM(s) Oral daily  insulin lispro (HumaLOG) corrective regimen sliding scale   SubCutaneous Before meals and at bedtime  pantoprazole  Injectable 40 milliGRAM(s) IV Push two times a day    MEDICATIONS  (PRN):    Vital Signs Last 24 Hrs  T(C): 36.7 (20 Sep 2018 05:03), Max: 37.2 (19 Sep 2018 22:26)  T(F): 98 (20 Sep 2018 05:03), Max: 98.9 (19 Sep 2018 22:26)  HR: 90 (20 Sep 2018 05:03) (90 - 110)  BP: 139/73 (20 Sep 2018 05:03) (139/73 - 148/77)  BP(mean): --  RR: 16 (20 Sep 2018 05:03) (16 - 18)  SpO2: 100% (20 Sep 2018 05:03) (100% - 100%)    General: WN/WD NAD  Neurology: A&Ox3  Respiratory: CTA B/L, no wheezes, no rhonchi, no rales  CV: RRR, S1S2, no murmur  Abdominal: Soft, NT, ND no palpable mass, bowel sounds positive  MSK: No edema, + peripheral pulses      Labs:                        8.4    11.9  )-----------( 288      ( 20 Sep 2018 07:03 )             26.6     09-20    139  |  109<H>  |  11  ----------------------------<  129<H>  3.9   |  23  |  0.76    Ca    8.5      20 Sep 2018 07:03

## 2022-05-04 NOTE — PROGRESS NOTE ADULT - SUBJECTIVE AND OBJECTIVE BOX
Patient comes to clinic for follow up anticoagulation visit.  Last INR on 4/12 was 1.7.  Dose increased.   Today's INR is 2.5 and is within goal range.    Current warfarin total weekly dose of 18 mg verified.  Informed the INR result is within therapeutic range and instructed to maintain current dose per protocol. Discussed dose and return date of 5/18/22 for next INR. See Anticoagulation flowsheet.    Dr. Villalpando is in the office today supervising the treatment.    Instructed to contact the clinic with any unusual bleeding or bruising, any changes in medications, diet, health status, lifestyle, or any other changes, questions or concerns. Verbalized understanding of all discussed.      HPI:  89yo female, from NH, AAOx1, with PMHx Dementia, HTN, DM, HLD, Iron deficiency anemia, Urinary Incontinence, sent from NH for Hb: 5. Patient is very demented and unable to give further details. Resting comfortably in bed, no abdominal pain, N/V/ hematochezia or hematemesis. Grandson at bedside, refers that patient has been having dark bowel movements for a couple of months back, before being admitted to NH for rehab. Patient was recently admitted on  for syncopal episode, found to be anemic (Hb: 7.1), FOBT (+), underwent EGD which was negative for active bleeding, found to have bazoar in stomach. 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 GOC discussed at length with grandson: Yousuf. Patient remains FULL CODE. He wants everything to be done for the patient, "until she tells me to stop". (23 Aug 2018 17:03)      Patient is a 89y old  Female who presents with a chief complaint of anemia (25 Aug 2018 16:47)      INTERVAL HPI/OVERNIGHT EVENTS:  T(C): 37.2 (18 @ 13:49), Max: 37.7 (18 @ 06:57)  HR: 97 (18 @ 13:49) (82 - 115)  BP: 135/55 (-18 @ 13:49) (135/55 - 161/89)  RR: 17 (18 @ 13:49) (16 - 19)  SpO2: 97% (18 @ 13:49) (97% - 100%)  Wt(kg): --  I&O's Summary      REVIEW OF SYSTEMS: denies fever, chills, SOB, palpitations, chest pain, abdominal pain, nausea, vomitting, diarrhea, constipation, dizziness    MEDICATIONS  (STANDING):  donepezil 10 milliGRAM(s) Oral at bedtime  furosemide   Injectable 20 milliGRAM(s) IV Push once  pantoprazole  Injectable 40 milliGRAM(s) IV Push two times a day    MEDICATIONS  (PRN):  acetaminophen    Suspension. 650 milliGRAM(s) Oral every 6 hours PRN Moderate Pain (4 - 6)  glucagon  Injectable 1 milliGRAM(s) IntraMuscular once PRN Glucose LESS THAN 70 milligrams/deciliter      PHYSICAL EXAM:  GENERAL: NAD, well-groomed, well-developed  HEAD:  Atraumatic, Normocephalic  EYES: EOMI, PERRLA, conjunctiva and sclera clear  ENMT: No tonsillar erythema, exudates, or enlargement; Moist mucous membranes, Good dentition, No lesions  NECK: Supple, No JVD, Normal thyroid  NERVOUS SYSTEM:  Alert & Oriented X3, Good concentration; Motor Strength 5/5 B/L upper and lower extremities; DTRs 2+ intact and symmetric  CHEST/LUNG: Clear to percussion bilaterally; No rales, rhonchi, wheezing, or rubs  HEART: Regular rate and rhythm; No murmurs, rubs, or gallops  ABDOMEN: Soft, Nontender, Nondistended; Bowel sounds present  EXTREMITIES:  2+ Peripheral Pulses, No clubbing, cyanosis, or edema  LYMPH: No lymphadenopathy noted  SKIN: No rashes or lesions  LABS:                        7.7    15.5  )-----------( 327      ( 28 Aug 2018 12:16 )             24.5         140  |  108  |  13  ----------------------------<  97  3.5   |  20<L>  |  0.87    Ca    8.4      28 Aug 2018 07:47  Phos  2.5       Mg     1.7             Urinalysis Basic - ( 28 Aug 2018 16:00 )    Color: Yellow / Appearance: Hazy / S.020 / pH: x  Gluc: x / Ketone: Moderate  / Bili: Negative / Urobili: 1   Blood: x / Protein: 30 mg/dL / Nitrite: Positive   Leuk Esterase: Trace / RBC: 2-5 /HPF / WBC 0-2 /HPF   Sq Epi: x / Non Sq Epi: Few /HPF / Bacteria: Many /HPF      CAPILLARY BLOOD GLUCOSE      POCT Blood Glucose.: 105 mg/dL (28 Aug 2018 11:44)  POCT Blood Glucose.: 103 mg/dL (28 Aug 2018 08:06)        Urinalysis Basic - ( 28 Aug 2018 16:00 )    Color: Yellow / Appearance: Hazy / S.020 / pH: x  Gluc: x / Ketone: Moderate  / Bili: Negative / Urobili: 1   Blood: x / Protein: 30 mg/dL / Nitrite: Positive   Leuk Esterase: Trace / RBC: 2-5 /HPF / WBC 0-2 /HPF   Sq Epi: x / Non Sq Epi: Few /HPF / Bacteria: Many /HPF

## 2022-06-22 NOTE — PROGRESS NOTE ADULT - PROBLEM SELECTOR PLAN 2
2/2 advancing dementia; dx per dietary. Albumin: 1.8. Spoke with patient's HCP at length - reports pt likely to pull out feeding tube if placed. He acknowledges PO intake likely to continue to decline due to advancing dementia and requests to continue comfort foods at this time.
c/w Aricept home dose  Behaviorally controlled at the moment
normal/well-groomed/no distress

## 2023-02-22 NOTE — PROGRESS NOTE ADULT - PROBLEM SELECTOR PROBLEM 5
Gastric motility disorder
Gastric motility disorder
Hypertension
Gastric motility disorder
Hypertension
Iwona
Gastric motility disorder
Gastric motility disorder
Hyperkalemia
Gastric motility disorder
Hydroxyzine Counseling: Patient advised that the medication is sedating and not to drive a car after taking this medication.  Patient informed of potential adverse effects including but not limited to dry mouth, urinary retention, and blurry vision.  The patient verbalized understanding of the proper use and possible adverse effects of hydroxyzine.  All of the patient's questions and concerns were addressed.

## 2023-08-11 NOTE — H&P ADULT - EJECTION FRACTION >40 YES
----- Message from CLEOPATRA Skinner sent at 8/11/2023 11:41 AM CDT -----  Please call:  -kidney function back at baseline  -plenty of fluids, continue to avoid NSAIDs   Ejection Fraction...

## 2025-04-15 NOTE — ED ADULT NURSE NOTE - EXTENSIONS OF SELF_ADULT
ALEXANDRU  She left a voicemail stating patient finished up home PT today and will attend [facility] at [address] on Thursday.   
PT order added to chart for pt  
Cane